# Patient Record
Sex: MALE | Race: WHITE | NOT HISPANIC OR LATINO | Employment: FULL TIME | URBAN - METROPOLITAN AREA
[De-identification: names, ages, dates, MRNs, and addresses within clinical notes are randomized per-mention and may not be internally consistent; named-entity substitution may affect disease eponyms.]

---

## 2017-03-17 ENCOUNTER — ALLSCRIPTS OFFICE VISIT (OUTPATIENT)
Dept: OTHER | Facility: OTHER | Age: 57
End: 2017-03-17

## 2017-04-18 ENCOUNTER — ALLSCRIPTS OFFICE VISIT (OUTPATIENT)
Dept: OTHER | Facility: OTHER | Age: 57
End: 2017-04-18

## 2017-05-02 ENCOUNTER — ALLSCRIPTS OFFICE VISIT (OUTPATIENT)
Dept: OTHER | Facility: OTHER | Age: 57
End: 2017-05-02

## 2017-06-08 RX ORDER — LORATADINE AND PSEUDOEPHEDRINE 10; 240 MG/1; MG/1
1 TABLET, EXTENDED RELEASE ORAL DAILY PRN
COMMUNITY
End: 2018-04-18 | Stop reason: SDUPTHER

## 2017-06-08 RX ORDER — SILDENAFIL 100 MG/1
100 TABLET, FILM COATED ORAL
COMMUNITY
End: 2018-07-06 | Stop reason: SDUPTHER

## 2017-06-08 RX ORDER — SIMVASTATIN 40 MG
40 TABLET ORAL DAILY
COMMUNITY
End: 2018-05-07 | Stop reason: SDUPTHER

## 2017-07-03 ENCOUNTER — ANESTHESIA EVENT (OUTPATIENT)
Dept: GASTROENTEROLOGY | Facility: MEDICAL CENTER | Age: 57
End: 2017-07-03
Payer: COMMERCIAL

## 2017-07-05 ENCOUNTER — HOSPITAL ENCOUNTER (OUTPATIENT)
Facility: MEDICAL CENTER | Age: 57
Setting detail: OUTPATIENT SURGERY
Discharge: HOME | End: 2017-07-05
Attending: INTERNAL MEDICINE | Admitting: INTERNAL MEDICINE
Payer: COMMERCIAL

## 2017-07-05 ENCOUNTER — ANESTHESIA (OUTPATIENT)
Dept: GASTROENTEROLOGY | Facility: MEDICAL CENTER | Age: 57
End: 2017-07-05
Payer: COMMERCIAL

## 2017-07-05 ENCOUNTER — GENERIC CONVERSION - ENCOUNTER (OUTPATIENT)
Dept: GASTROENTEROLOGY | Facility: MEDICAL CENTER | Age: 57
End: 2017-07-05

## 2017-07-05 VITALS
WEIGHT: 205 LBS | HEIGHT: 73 IN | HEART RATE: 68 BPM | TEMPERATURE: 96.9 F | DIASTOLIC BLOOD PRESSURE: 72 MMHG | RESPIRATION RATE: 16 BRPM | BODY MASS INDEX: 27.17 KG/M2 | OXYGEN SATURATION: 94 % | SYSTOLIC BLOOD PRESSURE: 112 MMHG

## 2017-07-05 DIAGNOSIS — R13.10 DYSPHAGIA: ICD-10-CM

## 2017-07-05 PROCEDURE — 88305 TISSUE EXAM BY PATHOLOGIST: CPT | Performed by: INTERNAL MEDICINE

## 2017-07-05 RX ORDER — PROPOFOL 10 MG/ML
INJECTION, EMULSION INTRAVENOUS AS NEEDED
Status: DISCONTINUED | OUTPATIENT
Start: 2017-07-05 | End: 2017-07-05 | Stop reason: SURG

## 2017-07-05 RX ORDER — SODIUM CHLORIDE 9 MG/ML
125 INJECTION, SOLUTION INTRAVENOUS CONTINUOUS
Status: DISCONTINUED | OUTPATIENT
Start: 2017-07-05 | End: 2017-07-05 | Stop reason: HOSPADM

## 2017-07-05 RX ORDER — ALBUTEROL SULFATE 90 UG/1
2 AEROSOL, METERED RESPIRATORY (INHALATION) EVERY 6 HOURS PRN
COMMUNITY
End: 2020-08-26

## 2017-07-05 RX ADMIN — LIDOCAINE HYDROCHLORIDE 50 MG: 20 INJECTION, SOLUTION INTRAVENOUS at 10:02

## 2017-07-05 RX ADMIN — SODIUM CHLORIDE 125 ML/HR: 0.9 INJECTION, SOLUTION INTRAVENOUS at 09:55

## 2017-07-05 RX ADMIN — PROPOFOL 50 MG: 10 INJECTION, EMULSION INTRAVENOUS at 10:06

## 2017-07-05 RX ADMIN — PROPOFOL 125 MG: 10 INJECTION, EMULSION INTRAVENOUS at 10:02

## 2017-07-05 RX ADMIN — SODIUM CHLORIDE: 0.9 INJECTION, SOLUTION INTRAVENOUS at 10:02

## 2017-07-10 ENCOUNTER — GENERIC CONVERSION - ENCOUNTER (OUTPATIENT)
Dept: OTHER | Facility: OTHER | Age: 57
End: 2017-07-10

## 2017-08-01 ENCOUNTER — ALLSCRIPTS OFFICE VISIT (OUTPATIENT)
Dept: OTHER | Facility: OTHER | Age: 57
End: 2017-08-01

## 2017-08-01 ENCOUNTER — GENERIC CONVERSION - ENCOUNTER (OUTPATIENT)
Dept: OTHER | Facility: OTHER | Age: 57
End: 2017-08-01

## 2017-08-01 LAB
BILIRUB UR QL STRIP: NORMAL
CLARITY UR: NORMAL
COLOR UR: YELLOW
GLUCOSE (HISTORICAL): NORMAL
HGB UR QL STRIP.AUTO: NORMAL
KETONES UR STRIP-MCNC: NORMAL MG/DL
LEUKOCYTE ESTERASE UR QL STRIP: NORMAL
NITRITE UR QL STRIP: NORMAL
PH UR STRIP.AUTO: 6 [PH]
PROT UR STRIP-MCNC: NORMAL MG/DL
SP GR UR STRIP.AUTO: 1.02
UROBILINOGEN UR QL STRIP.AUTO: 0.2

## 2018-01-10 NOTE — RESULT NOTES
Discussion/Summary   esophageal bx were negative for any pathology and within normal limits  If pt is still having swallowing issues we can try to do further evaluation with manometry  Please let pt know     Verified Results  (1) TISSUE EXAM [id number] 10:08AM Bisi Suazo     Test Name Result Flag Reference   LAB AP CASE REPORT (Report)     Surgical Pathology Report             Case: [id number]                   Authorizing Provider: Rachel Garrido MD      Collected:      07/05/2017 1008        Ordering Location:   [facility]    Received:      07/06/2017 [facility]                            Pathologist:      Hayley Mandujano DO                               Specimen:  Esophagus, proximal and mid esophagus biopsy r/o eosinophil esophagus   LAB AP FINAL DIAGNOSIS (Report)     A  Esophagus, proximal and mid, biopsy:  - Benign squamous mucosa with nonspecific reactive changes   - No intraepithelial eosinophils, columnar mucosa, intestinal metaplasia   or dysplasia identified  Interpretation performed at [place] [address]    Electronically signed by Hayley Mandujano DO on 7/7/2017 at 9:57 AM   LAB AP SURGICAL ADDITIONAL INFORMATION (Report)     These tests were developed and their performance characteristics   determined by [facility] or [facility]  They may not be cleared or approved by the [place]  Food and   Drug Administration  The FDA has determined that such clearance or   approval is not necessary  These tests are used for clinical purposes  They should not be regarded as investigational or for research  This   laboratory has been approved by CLIA 88, designated as a high-complexity   laboratory and is qualified to perform these tests  LAB AP GROSS DESCRIPTION (Report)     A   The specimen is received in formalin, labeled with the patient's name   and hospital number, and is designated proximal and mid esophagus   biopsy  The specimen consists of multiple white-tan soft tissue fragments   measuring in aggregate 0 6 x 0 2 x 0 1 cm  Entirely submitted  One   cassette  Note: The estimated total formalin fixation time based upon information   provided by the submitting clinician and the standard processing schedule   is 27 75 hours        AEK

## 2018-01-12 VITALS
HEART RATE: 68 BPM | HEIGHT: 72 IN | DIASTOLIC BLOOD PRESSURE: 88 MMHG | WEIGHT: 218 LBS | SYSTOLIC BLOOD PRESSURE: 130 MMHG | BODY MASS INDEX: 29.53 KG/M2

## 2018-01-13 VITALS
DIASTOLIC BLOOD PRESSURE: 60 MMHG | BODY MASS INDEX: 28.71 KG/M2 | HEART RATE: 80 BPM | HEIGHT: 72 IN | SYSTOLIC BLOOD PRESSURE: 96 MMHG | WEIGHT: 212 LBS

## 2018-01-13 NOTE — PROGRESS NOTES
Assessment   1  Encounter for preventive health examination (V70 0) (Z00 00)  2  Employed  3  Hyperlipidemia (272 4) (E78 5)  4  Allergic rhinitis (477 9) (J30 9)    Plan  Allergic rhinitis    · Renew: Loratadine-D 24HR  MG Oral Tablet Extended Release 24 Hour; TAKE 1  TABLET DAILY AS DIRECTED  Hyperlipidemia    · Renew: Simvastatin 40 MG Oral Tablet; take 1 tablet by mouth every day    Discussion/Summary  Patient is a 77-year-old man is here for his annual wellness visits  Since last seen he remains well with no significant new complaints  Physical examination was unremarkable  Blood tests performed at his work revealed a normal CBC, CMP, lipid profile, PSA and TSH  He will continue with his current medications and be seen on an annual basis  He will schedule a follow up colonoscopy as a result of a family history of colon cancer  Chief Complaint  YEARLY PHYSICAL  HX OF HYPERLIPIDEMIA  REVIEW LABS DONE BY EMPLOYER  History of Present Illness  HPI: Patient is here for his annual wellness visit  Since last seen he remains well with no significant new complaints  He continues to use his medication for hyperlipidemia and allergic rhinitis  Review of Systems  GENERAL / CONSTITUTIONAL: The patient feels well and denies any fever, fatigue or recent weight change  HEENT: No recent headaches or change in vision  No upper respiratory symptoms  No change in hearing  RESPIRATORY: No chest pain, cough, shortness of breath or wheezing  CARDIOVASCULAR: No angina, palpitations, dyspnea on exertion or edema  GASTROINTESTINAL: Appetite normal, no abdominal pain orl dyspepsia, no nausea or vomiting or change in bowel habits or rectal bleeding  GENITOURINARY: No urgency, frequency, dysuria, nocturia or hematuria  MUSCULOSKELETAL: No pain swelling of the joints  SKIN & BREASTS: No rashes or itching  No history of hair loss  NEUROLOGIC / PSYCHIATRIC: Patient has no symptoms of anxiety or depression   No recent headaches or muscle weakness  HEMATOLOGIC: No recent bruising or bleeding  Active Problems   1  Allergic rhinitis (477 9) (J30 9)  2  Asthma (493 90) (J45 909)  3  Basal Cell Carcinoma Of Skin Of Trunk (173 51)  4  Erectile dysfunction of non-organic origin (302 72) (F52 21)  5  History of allergy (V15 09) (Z88 9)  6  Hyperlipidemia (272 4) (E78 5)  7  Localized Osteoarthritis Of The Neck (715 38)  8  Migraine headache (346 90) (G43 909)    Surgical History    · History of Knee Surgery    Family History    · Family history of Headache   · Family history of Lung Cancer (V16 1)    · Family history of Acute Lymphoma   · Family history of Acute Myocardial Infarction (V17 3)   · Family history of Esophageal Cancer (V16 0)    · Family history of Colon Cancer (V16 0)   · Family history of Headache   · Family history of Skin Cancer (V16 8)    Social History    · Being A Social Drinker   · Employed   · Never A Smoker    Current Meds  1  Claritin-D 24 Hour  MG Oral Tablet Extended Release 24 Hour; Take 1 tablet daily   as directed; Therapy: 04BHC0730 to (Last Rx:17Mar2014) Ordered  2  ProAir  (90 Base) MCG/ACT Inhalation Aerosol Solution; INHALE 1-2 PUFFS   EVERY 4-6 HOURS AS NEEDED AND AS DIRECTED; Therapy: 31Qos6832 to (Evaluate:30Oct2013)  Requested for: 73QDY4748; Last   Rx:17Jxs8772 Ordered  3  Simvastatin 40 MG Oral Tablet; take 1 tablet by mouth every day; Therapy: 12KEW7685 to (Evaluate:19Mar2016)  Requested for: 63UHJ6018; Last   Rx:02Nzx4745 Ordered  4  Viagra 100 MG Oral Tablet; Therapy: 06CHX3534 to (Last Rx:34Edd7397)  Requested for: 64VRP0462 Ordered  5  Zyclara 3 75 % External Cream;   Therapy: 46ESX5488 to (Last KQ:78MMS7962)  Requested for: 12HGS1163 Ordered    Allergies   1  No Known Drug Allergies   2   Animal dander - Cats    Vitals   Recorded: 91EHG4525 09:01AM   Heart Rate 84   Systolic 088   Diastolic 60   Height 5 ft 11 8 in   Weight 215 lb 3 2 oz   BMI Calculated 29 35   BSA Calculated 2 19     Physical Exam  General: Patient is a well developed and alert and who appears stated age and is in no distress  Vital Signs: As charted  HEENT: Normocephalic  Pupils equal, round and react to light and accommodation  TMs intact  Oral cavity within normal limits  Neck: Supple and symmetric without masses  Thyroid not enlarged  No evidence of carotid bruits  Respiratory: Normal effort  Breath sounds equal with no rhonchi or rales  No evidence of wheezing  Cardiovascular: Regular rate and rhythm  No murmurs or gallop  No edema  Gastrointestinal: Soft  Non-tender  No masses or organomegaly  Musculoskeletal: Intact  Skin: No rashes, ulcers or nodules noted  Neurologic: No focal neurological deficits  Orientated x 3  Psychologic: Mood is normal  Affect is appropriate          Results/Data  [place] 30SJT3718 09:08AM User, Ahs     Test Name Result Flag Reference   SBIRT Screen - Tobacco Screening Result Negative       PHQ-2 Adult Depression Screening 41MJI4066 09:07AM User, Ahs     Test Name Result Flag Reference   PHQ-2 Adult Depression Score 0     Q1: 0, Q2: 0   PHQ-2 Adult Depression Screening Negative         Signatures   Electronically signed by : WES Syed ; Mar 15 2016  8:13AM EST                       (Author)

## 2018-01-14 VITALS
WEIGHT: 214 LBS | SYSTOLIC BLOOD PRESSURE: 120 MMHG | TEMPERATURE: 98.1 F | OXYGEN SATURATION: 99 % | DIASTOLIC BLOOD PRESSURE: 80 MMHG | HEIGHT: 71 IN | HEART RATE: 75 BPM | BODY MASS INDEX: 29.96 KG/M2

## 2018-01-14 VITALS
HEIGHT: 72 IN | WEIGHT: 212 LBS | SYSTOLIC BLOOD PRESSURE: 125 MMHG | BODY MASS INDEX: 28.71 KG/M2 | DIASTOLIC BLOOD PRESSURE: 71 MMHG

## 2018-04-18 DIAGNOSIS — J30.9 ALLERGIC RHINITIS, UNSPECIFIED SEASONALITY, UNSPECIFIED TRIGGER: Primary | ICD-10-CM

## 2018-04-18 RX ORDER — LORATADINE 10 MG
TABLET ORAL
Qty: 30 TABLET | Refills: 0 | Status: SHIPPED | OUTPATIENT
Start: 2018-04-18 | End: 2018-07-06 | Stop reason: ALTCHOICE

## 2018-05-07 DIAGNOSIS — E78.2 MIXED HYPERLIPIDEMIA: ICD-10-CM

## 2018-05-07 DIAGNOSIS — E78.2 MIXED HYPERLIPIDEMIA: Primary | ICD-10-CM

## 2018-05-07 RX ORDER — SIMVASTATIN 40 MG
40 TABLET ORAL DAILY
Qty: 30 TABLET | Refills: 0 | Status: SHIPPED | OUTPATIENT
Start: 2018-05-07 | End: 2018-05-07 | Stop reason: SDUPTHER

## 2018-05-07 RX ORDER — SIMVASTATIN 40 MG
TABLET ORAL
Qty: 90 TABLET | Refills: 0 | Status: SHIPPED | OUTPATIENT
Start: 2018-05-07 | End: 2018-07-06 | Stop reason: SDUPTHER

## 2018-07-06 ENCOUNTER — OFFICE VISIT (OUTPATIENT)
Dept: FAMILY MEDICINE CLINIC | Facility: CLINIC | Age: 58
End: 2018-07-06
Payer: COMMERCIAL

## 2018-07-06 VITALS
DIASTOLIC BLOOD PRESSURE: 70 MMHG | WEIGHT: 213 LBS | HEART RATE: 96 BPM | SYSTOLIC BLOOD PRESSURE: 100 MMHG | BODY MASS INDEX: 28.23 KG/M2 | HEIGHT: 73 IN

## 2018-07-06 DIAGNOSIS — J30.9 ALLERGIC RHINITIS, UNSPECIFIED SEASONALITY, UNSPECIFIED TRIGGER: ICD-10-CM

## 2018-07-06 DIAGNOSIS — E78.2 MIXED HYPERLIPIDEMIA: Primary | ICD-10-CM

## 2018-07-06 DIAGNOSIS — H11.32 SUBCONJUNCTIVAL HEMORRHAGE OF LEFT EYE: ICD-10-CM

## 2018-07-06 DIAGNOSIS — Z12.5 SCREENING FOR PROSTATE CANCER: ICD-10-CM

## 2018-07-06 DIAGNOSIS — Z12.11 SCREEN FOR COLON CANCER: ICD-10-CM

## 2018-07-06 DIAGNOSIS — K21.9 GASTROESOPHAGEAL REFLUX DISEASE WITHOUT ESOPHAGITIS: ICD-10-CM

## 2018-07-06 PROBLEM — L84 CORN OF TOE: Status: ACTIVE | Noted: 2017-04-18

## 2018-07-06 PROCEDURE — 99214 OFFICE O/P EST MOD 30 MIN: CPT | Performed by: FAMILY MEDICINE

## 2018-07-06 RX ORDER — SIMVASTATIN 40 MG
40 TABLET ORAL DAILY
Qty: 90 TABLET | Refills: 1 | Status: SHIPPED | OUTPATIENT
Start: 2018-07-06 | End: 2019-02-02 | Stop reason: SDUPTHER

## 2018-07-06 RX ORDER — LORATADINE AND PSEUDOEPHEDRINE 10; 240 MG/1; MG/1
1 TABLET, EXTENDED RELEASE ORAL DAILY PRN
Qty: 90 TABLET | Refills: 1 | Status: SHIPPED | OUTPATIENT
Start: 2018-07-06 | End: 2019-01-19 | Stop reason: SDUPTHER

## 2018-07-06 RX ORDER — SILDENAFIL 100 MG/1
TABLET, FILM COATED ORAL
COMMUNITY
Start: 2017-07-05 | End: 2021-12-02

## 2018-07-06 RX ORDER — LORATADINE AND PSEUDOEPHEDRINE 10; 240 MG/1; MG/1
1 TABLET, EXTENDED RELEASE ORAL DAILY PRN
COMMUNITY
Start: 2014-03-17 | End: 2018-07-06 | Stop reason: SDUPTHER

## 2018-07-06 RX ORDER — SIMVASTATIN 40 MG
1 TABLET ORAL DAILY
COMMUNITY
Start: 2012-07-27 | End: 2018-07-06 | Stop reason: SDUPTHER

## 2018-07-06 NOTE — ASSESSMENT & PLAN NOTE
Patient to get labs done and will be called with those results  His last lipid panel was within normal limits with his LDL 76 and that was February of 2017

## 2018-07-06 NOTE — ASSESSMENT & PLAN NOTE
Patient has been sneezing a lot and this may be causing his subconjunctival hemorrhage in his left eye

## 2018-07-06 NOTE — ASSESSMENT & PLAN NOTE
Patient has had 3 episodes in the last several months of a burst blood vessel in the left eye  He has a history of a severe corneal abrasion in this eye in the past but it has healed completely and he has no visual disturbance  If the subconjunctival hemorrhage occurs again he is to see his eye doctor

## 2018-07-06 NOTE — PROGRESS NOTES
Assessment/Plan:    Allergic rhinitis  Patient has been sneezing a lot and this may be causing his subconjunctival hemorrhage in his left eye  GERD (gastroesophageal reflux disease)  Stable at the present time  He did have an EGD done several years ago and was within normal limits  Hyperlipidemia  Patient to get labs done and will be called with those results  His last lipid panel was within normal limits with his LDL 76 and that was February of 2017  Subconjunctival hemorrhage of left eye  Patient has had 3 episodes in the last several months of a burst blood vessel in the left eye  He has a history of a severe corneal abrasion in this eye in the past but it has healed completely and he has no visual disturbance  If the subconjunctival hemorrhage occurs again he is to see his eye doctor  Diagnoses and all orders for this visit:    Mixed hyperlipidemia  -     CBC and differential; Future  -     Comprehensive metabolic panel; Future  -     Lipid Panel with Direct LDL reflex; Future  -     TSH, 3rd generation; Future  -     Hemoglobin A1C; Future    Allergic rhinitis, unspecified seasonality, unspecified trigger  -     CBC and differential; Future  -     Comprehensive metabolic panel; Future  -     Lipid Panel with Direct LDL reflex; Future  -     TSH, 3rd generation; Future  -     Hemoglobin A1C; Future    Gastroesophageal reflux disease without esophagitis  -     CBC and differential; Future  -     Comprehensive metabolic panel; Future  -     Lipid Panel with Direct LDL reflex; Future  -     TSH, 3rd generation; Future  -     Hemoglobin A1C; Future    Screen for colon cancer  -     Occult Bloood,Fecal Immunochemical; Future    Screening for prostate cancer  -     PSA, total and free; Future    Subconjunctival hemorrhage of left eye    Other orders  -     loratadine-pseudoephedrine (CLARITIN-D 24-HOUR)  mg per 24 hr tablet;  Take 1 tablet by mouth daily as needed  -     simvastatin (ZOCOR) 40 mg tablet; Take 1 tablet by mouth daily  -     sildenafil (VIAGRA) 100 mg tablet; Take by mouth          Subjective: Follow up asthma, GERD, ED, hyperlipidemia  Pt states he had BW done for his employer as part of a wellness program in March, but we do not have results  He also notes he has been having frequent burst blood vessel in left eye  It has occurred 3 times in the past few months  -  [facility]     Patient ID: Gris Valero is a 62 y o  male  This is a 75-year-old male who comes in for his regular 6 month visit  He is feeling well  He has been having a problem with the left eye and a blood vessel bursting in the eye and it has occurred 3 times in the past few months  The only injury he had to that I was a bad abrasion and had seen a retinologist for it but he has not had any visual disturbances  His blood pressure is 100/70 and his weight is up 1 lb from the previous visit to 213 lb  Reviewing his labs from last year his glucose was 102, his lipids were within normal range  He will get labs fasting and be called with those results now  The following portions of the patient's history were reviewed and updated as appropriate: allergies, current medications, past family history, past medical history, past social history, past surgical history and problem list     Review of Systems   Constitutional: Negative  HENT: Negative  Eyes: Positive for redness  Negative for pain, discharge and itching  Left eye redness from a burst blood vessel   Respiratory: Negative  Cardiovascular: Negative  Gastrointestinal: Negative  Endocrine: Negative  Genitourinary: Negative  Musculoskeletal: Negative  Skin: Negative  Allergic/Immunologic: Negative  Neurological: Negative  Hematological: Negative  Psychiatric/Behavioral: Negative            Objective:      /70 (BP Location: Left arm, Patient Position: Sitting, Cuff Size: Large)   Pulse 96   Ht 6' 1" (1 854 m) Wt 96 6 kg (213 lb)   BMI 28 10 kg/m²          Physical Exam   Constitutional: He is oriented to person, place, and time  He appears well-developed and well-nourished  HENT:   Head: Normocephalic  Right Ear: External ear normal    Left Ear: External ear normal    Mouth/Throat: Oropharynx is clear and moist    Eyes: Conjunctivae and EOM are normal  Pupils are equal, round, and reactive to light  Right eye exhibits no discharge  Left eye exhibits no discharge  No scleral icterus  Subconjunctiva hemorrhage left eye   Neck: Normal range of motion  Neck supple  Cardiovascular: Normal rate, regular rhythm and normal heart sounds  Pulmonary/Chest: Effort normal and breath sounds normal    Abdominal: Soft  Bowel sounds are normal    Musculoskeletal: Normal range of motion  Neurological: He is alert and oriented to person, place, and time  Skin: Skin is warm  Psychiatric: He has a normal mood and affect  His behavior is normal  Judgment and thought content normal    Nursing note and vitals reviewed

## 2018-07-27 ENCOUNTER — OFFICE VISIT (OUTPATIENT)
Dept: FAMILY MEDICINE CLINIC | Facility: CLINIC | Age: 58
End: 2018-07-27
Payer: COMMERCIAL

## 2018-07-27 VITALS
DIASTOLIC BLOOD PRESSURE: 70 MMHG | SYSTOLIC BLOOD PRESSURE: 106 MMHG | HEART RATE: 84 BPM | BODY MASS INDEX: 28.23 KG/M2 | WEIGHT: 213 LBS | HEIGHT: 73 IN

## 2018-07-27 DIAGNOSIS — T30.0 BURN: Primary | ICD-10-CM

## 2018-07-27 PROCEDURE — 3008F BODY MASS INDEX DOCD: CPT | Performed by: FAMILY MEDICINE

## 2018-07-27 PROCEDURE — 99213 OFFICE O/P EST LOW 20 MIN: CPT | Performed by: FAMILY MEDICINE
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
## 2018-07-27 NOTE — ASSESSMENT & PLAN NOTE
Patient's burn appears to be quite significant, but does appear to be healing quite well  There does not appear to be any infection  I would recommend using Silvadene  He should apply this to a sterile gauze, apply the gauze to the area, wrap with Alma, and tape to the Loma  In order to keep the Loma on his leg, I would recommend using CoBan dressing above that
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
## 2019-01-19 DIAGNOSIS — J30.9 ALLERGIC RHINITIS, UNSPECIFIED SEASONALITY, UNSPECIFIED TRIGGER: ICD-10-CM

## 2019-01-21 RX ORDER — LORATADINE 10 MG
TABLET ORAL
Qty: 90 TABLET | Refills: 0 | Status: SHIPPED | OUTPATIENT
Start: 2019-01-21 | End: 2019-04-12 | Stop reason: SDUPTHER

## 2019-02-02 DIAGNOSIS — E78.2 MIXED HYPERLIPIDEMIA: ICD-10-CM

## 2019-02-04 RX ORDER — SIMVASTATIN 40 MG
TABLET ORAL
Qty: 90 TABLET | Refills: 0 | Status: SHIPPED | OUTPATIENT
Start: 2019-02-04 | End: 2019-04-12 | Stop reason: SDUPTHER

## 2019-04-12 ENCOUNTER — OFFICE VISIT (OUTPATIENT)
Dept: FAMILY MEDICINE CLINIC | Facility: CLINIC | Age: 59
End: 2019-04-12
Payer: COMMERCIAL

## 2019-04-12 VITALS
HEART RATE: 80 BPM | WEIGHT: 219 LBS | HEIGHT: 73 IN | SYSTOLIC BLOOD PRESSURE: 116 MMHG | BODY MASS INDEX: 29.03 KG/M2 | DIASTOLIC BLOOD PRESSURE: 70 MMHG

## 2019-04-12 DIAGNOSIS — K21.9 GASTROESOPHAGEAL REFLUX DISEASE WITHOUT ESOPHAGITIS: ICD-10-CM

## 2019-04-12 DIAGNOSIS — M19.91 PRIMARY OSTEOARTHRITIS, UNSPECIFIED SITE: ICD-10-CM

## 2019-04-12 DIAGNOSIS — Z12.11 SCREEN FOR COLON CANCER: ICD-10-CM

## 2019-04-12 DIAGNOSIS — J30.9 ALLERGIC RHINITIS, UNSPECIFIED SEASONALITY, UNSPECIFIED TRIGGER: ICD-10-CM

## 2019-04-12 DIAGNOSIS — Z12.5 SCREENING FOR PROSTATE CANCER: ICD-10-CM

## 2019-04-12 DIAGNOSIS — E78.2 MIXED HYPERLIPIDEMIA: Primary | ICD-10-CM

## 2019-04-12 DIAGNOSIS — J45.20 MILD INTERMITTENT ASTHMA WITHOUT COMPLICATION: ICD-10-CM

## 2019-04-12 DIAGNOSIS — B35.4 TINEA CORPORIS: ICD-10-CM

## 2019-04-12 DIAGNOSIS — F52.21 ERECTILE DYSFUNCTION OF NON-ORGANIC ORIGIN: ICD-10-CM

## 2019-04-12 PROBLEM — H11.32 SUBCONJUNCTIVAL HEMORRHAGE OF LEFT EYE: Status: RESOLVED | Noted: 2018-07-06 | Resolved: 2019-04-12

## 2019-04-12 PROBLEM — T30.0 BURN: Status: RESOLVED | Noted: 2018-07-27 | Resolved: 2019-04-12

## 2019-04-12 PROCEDURE — 3008F BODY MASS INDEX DOCD: CPT | Performed by: FAMILY MEDICINE

## 2019-04-12 PROCEDURE — 1036F TOBACCO NON-USER: CPT | Performed by: FAMILY MEDICINE

## 2019-04-12 PROCEDURE — 99214 OFFICE O/P EST MOD 30 MIN: CPT | Performed by: FAMILY MEDICINE

## 2019-04-12 RX ORDER — LORATADINE AND PSEUDOEPHEDRINE 10; 240 MG/1; MG/1
1 TABLET, EXTENDED RELEASE ORAL DAILY
Qty: 90 TABLET | Refills: 3 | Status: SHIPPED | OUTPATIENT
Start: 2019-04-12 | End: 2020-03-18 | Stop reason: SDUPTHER

## 2019-04-12 RX ORDER — SIMVASTATIN 40 MG
40 TABLET ORAL DAILY
Qty: 90 TABLET | Refills: 3 | Status: SHIPPED | OUTPATIENT
Start: 2019-04-12 | End: 2020-03-18 | Stop reason: SDUPTHER

## 2019-04-12 RX ORDER — CLOTRIMAZOLE AND BETAMETHASONE DIPROPIONATE 10; .64 MG/G; MG/G
CREAM TOPICAL 2 TIMES DAILY
Qty: 30 G | Refills: 0 | Status: SHIPPED | OUTPATIENT
Start: 2019-04-12 | End: 2020-08-26

## 2019-04-12 RX ORDER — MELOXICAM 15 MG/1
TABLET ORAL
Refills: 0 | COMMUNITY
Start: 2019-04-11 | End: 2020-08-26

## 2019-07-26 ENCOUNTER — TELEPHONE (OUTPATIENT)
Dept: FAMILY MEDICINE CLINIC | Facility: CLINIC | Age: 59
End: 2019-07-26

## 2019-07-26 NOTE — TELEPHONE ENCOUNTER
Patient is on meloxicam from his orthopedic  They told him that we should be doing blood work to monitor his liver since he is on that medicine   Please call him at [phone]

## 2019-07-27 NOTE — TELEPHONE ENCOUNTER
With regard to meloxicam, it is unlikely to cause liver damage  It may cause kidney issues  This is true for all nonsteroidal anti-inflammatories  He did have blood work done before, which was normal     If there are specific issues that Orthopedics is concerned about, they should let us know what exactly it is that they are concerned about, especially as they are the ones prescribing the medication, and should be responsible for follow-up of it  We do not have any problems checking blood work, but you do not normally have to do that from meloxicam   Follow-up at office visit to review this in the future

## 2019-07-29 NOTE — TELEPHONE ENCOUNTER
I advised to pt that his last labs were fine but if Ortho is to continue medication they should monitor his labs or he may make a OV here and we can discuss other options

## 2020-03-18 DIAGNOSIS — E78.2 MIXED HYPERLIPIDEMIA: ICD-10-CM

## 2020-03-18 DIAGNOSIS — J30.9 ALLERGIC RHINITIS, UNSPECIFIED SEASONALITY, UNSPECIFIED TRIGGER: ICD-10-CM

## 2020-03-18 RX ORDER — LORATADINE AND PSEUDOEPHEDRINE 10; 240 MG/1; MG/1
1 TABLET, EXTENDED RELEASE ORAL DAILY
Qty: 90 TABLET | Refills: 0 | Status: SHIPPED | OUTPATIENT
Start: 2020-03-18 | End: 2020-08-26 | Stop reason: SDUPTHER

## 2020-03-18 RX ORDER — SIMVASTATIN 40 MG
40 TABLET ORAL DAILY
Qty: 90 TABLET | Refills: 0 | Status: SHIPPED | OUTPATIENT
Start: 2020-03-18 | End: 2020-08-04 | Stop reason: SDUPTHER

## 2020-03-18 NOTE — TELEPHONE ENCOUNTER
BT, Pt is requesting a refill for his Simvastatin and his Claritin D but has not been here since April of 2019, Mat we refill or does pt need ov?

## 2020-03-18 NOTE — TELEPHONE ENCOUNTER
Patient needs an appointment in the near future    In the meantime I will renew his simvastatin and Claritin D  please have the make an appointment when you let him know that the medicine will be renewed otherwise he is going to fall between the cracks and will not be seen in an appropriate

## 2020-08-03 ENCOUNTER — TELEPHONE (OUTPATIENT)
Dept: FAMILY MEDICINE CLINIC | Facility: CLINIC | Age: 60
End: 2020-08-03

## 2020-08-03 NOTE — TELEPHONE ENCOUNTER
PATIENT STATES HE HAS BEEN OUT OF TOWN AND THEN COVID HAPPENED AND HE IS NOT BACK IN TOWN UNTIL THE END OF THE MONTH,WOULD LIKE HIS SIMVASTATIN SENT TO [address] [place] [address] [place] [address]  PHONE [phone]  PATIENT IS OUT OF THIS MEDICATION AND IS SUPPOSED TO BE TAKING DAILY  PATIENT IS SCHEDULED TO SEE DR DEE LAST WEEK IN [place]   PLEASE CALL PATIENT TO ADVISE

## 2020-08-04 DIAGNOSIS — E78.2 MIXED HYPERLIPIDEMIA: ICD-10-CM

## 2020-08-04 DIAGNOSIS — Z12.5 SCREENING FOR PROSTATE CANCER: Primary | ICD-10-CM

## 2020-08-04 RX ORDER — SIMVASTATIN 40 MG
TABLET ORAL
Qty: 90 TABLET | OUTPATIENT
Start: 2020-08-04

## 2020-08-04 RX ORDER — SIMVASTATIN 40 MG
40 TABLET ORAL DAILY
Qty: 30 TABLET | Refills: 0 | Status: SHIPPED | OUTPATIENT
Start: 2020-08-04 | End: 2020-08-26 | Stop reason: SDUPTHER

## 2020-08-04 NOTE — TELEPHONE ENCOUNTER
PATIENT STATES HE HAS BEEN OUT OF TOWN AND THEN COVID HAPPENED AND HE IS NOT BACK IN TOWN UNTIL THE END OF THE MONTH,WOULD LIKE HIS SIMVASTATIN SENT TO [address] [place] [address]  PHONE [phone]  PATIENT IS OUT OF THIS MEDICATION AND IS SUPPOSED TO BE TAKING DAILY  PATIENT IS SCHEDULED TO SEE DR DEE LAST WEEK IN [place]   PLEASE CALL PATIENT TO ADVISE

## 2020-08-26 ENCOUNTER — OFFICE VISIT (OUTPATIENT)
Dept: FAMILY MEDICINE CLINIC | Facility: CLINIC | Age: 60
End: 2020-08-26
Payer: COMMERCIAL

## 2020-08-26 VITALS
WEIGHT: 211 LBS | SYSTOLIC BLOOD PRESSURE: 120 MMHG | HEIGHT: 73 IN | TEMPERATURE: 97.1 F | HEART RATE: 80 BPM | BODY MASS INDEX: 27.96 KG/M2 | DIASTOLIC BLOOD PRESSURE: 74 MMHG

## 2020-08-26 DIAGNOSIS — K21.9 GASTROESOPHAGEAL REFLUX DISEASE WITHOUT ESOPHAGITIS: ICD-10-CM

## 2020-08-26 DIAGNOSIS — L30.9 ECZEMA, UNSPECIFIED TYPE: ICD-10-CM

## 2020-08-26 DIAGNOSIS — Z12.5 PROSTATE CANCER SCREENING: ICD-10-CM

## 2020-08-26 DIAGNOSIS — J45.20 MILD INTERMITTENT ASTHMA WITHOUT COMPLICATION: ICD-10-CM

## 2020-08-26 DIAGNOSIS — E78.2 MIXED HYPERLIPIDEMIA: Primary | ICD-10-CM

## 2020-08-26 DIAGNOSIS — Z80.0 FAMILY HISTORY OF COLON CANCER: ICD-10-CM

## 2020-08-26 DIAGNOSIS — J30.9 ALLERGIC RHINITIS, UNSPECIFIED SEASONALITY, UNSPECIFIED TRIGGER: ICD-10-CM

## 2020-08-26 DIAGNOSIS — C44.519 BASAL CELL CARCINOMA OF SKIN OF TRUNK, EXCEPT SCROTUM: ICD-10-CM

## 2020-08-26 PROBLEM — Z12.11 SCREEN FOR COLON CANCER: Status: RESOLVED | Noted: 2019-04-12 | Resolved: 2020-08-26

## 2020-08-26 PROCEDURE — 3008F BODY MASS INDEX DOCD: CPT | Performed by: FAMILY MEDICINE

## 2020-08-26 PROCEDURE — 1036F TOBACCO NON-USER: CPT | Performed by: FAMILY MEDICINE

## 2020-08-26 PROCEDURE — 3725F SCREEN DEPRESSION PERFORMED: CPT | Performed by: FAMILY MEDICINE

## 2020-08-26 PROCEDURE — 99214 OFFICE O/P EST MOD 30 MIN: CPT | Performed by: FAMILY MEDICINE

## 2020-08-26 RX ORDER — SIMVASTATIN 40 MG
40 TABLET ORAL DAILY
Qty: 90 TABLET | Refills: 3 | Status: SHIPPED | OUTPATIENT
Start: 2020-08-26 | End: 2021-08-30

## 2020-08-26 RX ORDER — MOMETASONE FUROATE 1 MG/G
CREAM TOPICAL DAILY
Qty: 45 G | Refills: 2 | Status: SHIPPED | OUTPATIENT
Start: 2020-08-26 | End: 2021-10-04

## 2020-08-26 RX ORDER — LORATADINE 10 MG
1 TABLET ORAL DAILY
Qty: 90 TABLET | Refills: 1 | Status: SHIPPED | OUTPATIENT
Start: 2020-08-26 | End: 2021-03-15 | Stop reason: SDUPTHER

## 2020-08-26 NOTE — ASSESSMENT & PLAN NOTE
Recommend mometasone  Follow-up in the future as needed  Can also ask Dermatology when he gets in there

## 2020-08-26 NOTE — PROGRESS NOTES
Assessment and Plan:    Problem List Items Addressed This Visit     Allergic rhinitis     Continue Claritin D  Has been OK with decongestants  No changes  Relevant Medications    loratadine-pseudoephedrine (Wal-itin D 24 Hour)  mg per 24 hr tablet    Asthma     Patient does have a history of intermittent asthma, without any current problems  Would recommend that he have an albuterol inhaler available if needed  Recommend flu shot yearly  Basal cell carcinoma of skin of trunk, except scrotum     Patient has had problems previously with basal cell carcinoma, and would like to see Dermatology  Will refer  Relevant Medications    mometasone (ELOCON) 0 1 % cream    Other Relevant Orders    Ambulatory referral to Dermatology    Eczema     Recommend mometasone  Follow-up in the future as needed  Can also ask Dermatology when he gets in there  Relevant Medications    mometasone (ELOCON) 0 1 % cream    GERD (gastroesophageal reflux disease)     Doing well  Avoids certain foods  Hyperlipidemia - Primary     Laboratory studies for cholesterol in February were quite good  Recommend check twice a year  Since he gets a biometric screen with employer, would recommend check 6 months after that  We should follow-up not too long after he has the biometric screen so we can review the numbers as well  Currently, no changes recommended to simvastatin  Check laboratory studies, and then recommend follow-up in 6 months, which should be about the time he is getting the biometric screening done, therefore I will not order more blood tests for this  Relevant Medications    simvastatin (ZOCOR) 40 mg tablet      Other Visit Diagnoses     Family history of colon cancer        Recommend follow with colorectal surgery  Appears he would be due for colonoscopy in approximately 2026, but he will check his records and their records      Relevant Orders    Ambulatory referral for colonoscopy    Ambulatory referral to Colorectal Surgery    Prostate cancer screening        Check PSA with next labs  Ordered previously  Diagnoses and all orders for this visit:    Mixed hyperlipidemia  -     simvastatin (ZOCOR) 40 mg tablet; Take 1 tablet (40 mg total) by mouth daily    Allergic rhinitis, unspecified seasonality, unspecified trigger  -     loratadine-pseudoephedrine (Wal-itin D 24 Hour)  mg per 24 hr tablet; Take 1 tablet by mouth daily    Eczema, unspecified type  -     mometasone (ELOCON) 0 1 % cream; Apply topically daily    Basal cell carcinoma of skin of trunk, except scrotum  -     Ambulatory referral to Dermatology; Future    Family history of colon cancer  Comments:  Recommend follow with colorectal surgery  Appears he would be due for colonoscopy in approximately 2026, but he will check his records and their records  Orders:  -     Ambulatory referral for colonoscopy; Future  -     Ambulatory referral to Colorectal Surgery; Future    Mild intermittent asthma without complication    Gastroesophageal reflux disease without esophagitis    Prostate cancer screening  Comments:  Check PSA with next labs  Ordered previously  Subjective:      Patient ID: Ryne Greenberg is a 61 y o  male  CC:    Chief Complaint   Patient presents with    Follow-up     f/u to chronic conditions and review bw that he brought along with him from work 2/19/20  mjs    Foot Problem     c/o sores on right foot  HPI:    Patient is here to re-evaluate medical problems  Patient has history of seasonal allergies  He currently is using Claritin D  He does not have any side effects from the decongestant portion  Seems to work form quite well  He is requesting renewal     Hyperlipidemia:  Currently on simvastatin  Laboratory studies from February from his biometric screening were normal   No current problems from the statins  GERD: Occasional issues    Had EGD before  He has had some dysphagia with certain meds  EGD was in 2017 for the same issue  Was OK then  Prostate cancer screen: Has not had PSA recently  Colon cancer screen: He had scope before  Appears it was [id number]  Patient reports that he has some lesions on the right foot  Has been there for quite some time  He was not sure what to do about it  Thought that it might be eczema  He also mention that he has several skin lesions from before, would like to see a dermatologist for full skin evaluation  Patient does have a history of asthma, but he has specific triggers for it such as hay, cats  Some other things can also cause it, but overall he has not really needed to use any treatment for it on a longstanding basis  The following portions of the patient's history were reviewed and updated as appropriate: allergies, current medications, past family history, past medical history, past social history, past surgical history and problem list       Review of Systems   Constitutional: Negative  HENT: Negative  Eyes: Negative  Respiratory: Negative  Cardiovascular: Negative  Gastrointestinal: Negative  Endocrine: Negative  Genitourinary: Negative  Musculoskeletal: Negative  Skin: Positive for rash  Allergic/Immunologic: Negative  Neurological: Negative  Hematological: Negative  Psychiatric/Behavioral: Negative  Data to review:   Laboratories from February 19th at lab core reviewed  Blood sugar 100  Creatinine 1 18, GFR 67  Sodium 142, potassium 4 1, calcium 9 4  AST 32, ALT 51) slightly high verses 44)  Total cholesterol 168, LDL 84, HDL 53, triglycerides 155  Uric acid 6 3  Iron 136, normal   TSH 1 84  White count 5 8, hemoglobin 14 7, hematocrit 42 2, platelets 167       Objective:    Vitals:    08/26/20 1802   BP: 120/74   Pulse: 80   Temp: (!) 97 1 °F (36 2 °C)   Weight: 95 7 kg (211 lb)   Height: 6' 1" (1 854 m)        Physical Exam  Vitals signs and nursing note reviewed  Constitutional:       Appearance: Normal appearance  Cardiovascular:      Rate and Rhythm: Normal rate and regular rhythm  Pulses: Normal pulses  Carotid pulses are 2+ on the right side and 2+ on the left side  Heart sounds: Normal heart sounds  No murmur  No gallop  Neurological:      Mental Status: He is alert  BMI Counseling: Body mass index is 27 84 kg/m²  The BMI is above normal  Nutrition recommendations include decreasing portion sizes, encouraging healthy choices of fruits and vegetables, decreasing fast food intake, consuming healthier snacks, limiting drinks that contain sugar, moderation in carbohydrate intake, increasing intake of lean protein, reducing intake of saturated and trans fat and reducing intake of cholesterol  Exercise recommendations include exercising 3-5 times per week  No pharmacotherapy was ordered

## 2020-08-26 NOTE — ASSESSMENT & PLAN NOTE
Patient does have a history of intermittent asthma, without any current problems  Would recommend that he have an albuterol inhaler available if needed  Recommend flu shot yearly

## 2020-08-26 NOTE — PATIENT INSTRUCTIONS
Problem List Items Addressed This Visit     Allergic rhinitis     Continue Claritin D  Has been OK with decongestants  No changes  Relevant Medications    loratadine-pseudoephedrine (Wal-itin D 24 Hour)  mg per 24 hr tablet    Asthma     Patient does have a history of intermittent asthma, without any current problems  Would recommend that he have an albuterol inhaler available if needed  Recommend flu shot yearly  Basal cell carcinoma of skin of trunk, except scrotum     Patient has had problems previously with basal cell carcinoma, and would like to see Dermatology  Will refer  Relevant Medications    mometasone (ELOCON) 0 1 % cream    Other Relevant Orders    Ambulatory referral to Dermatology    Eczema     Recommend mometasone  Follow-up in the future as needed  Can also ask Dermatology when he gets in there  Relevant Medications    mometasone (ELOCON) 0 1 % cream    GERD (gastroesophageal reflux disease)     Doing well  Avoids certain foods  Hyperlipidemia - Primary     Laboratory studies for cholesterol in February were quite good  Recommend check twice a year  Since he gets a biometric screen with employer, would recommend check 6 months after that  We should follow-up not too long after he has the biometric screen so we can review the numbers as well  Currently, no changes recommended to simvastatin  Check laboratory studies, and then recommend follow-up in 6 months, which should be about the time he is getting the biometric screening done, therefore I will not order more blood tests for this  Relevant Medications    simvastatin (ZOCOR) 40 mg tablet      Other Visit Diagnoses     Family history of colon cancer        Recommend follow with colorectal surgery  Appears he would be due for colonoscopy in approximately 2026, but he will check his records and their records      Relevant Orders    Ambulatory referral for colonoscopy    Ambulatory referral to Colorectal Surgery    Prostate cancer screening        Check PSA with next labs  Ordered previously

## 2020-08-26 NOTE — ASSESSMENT & PLAN NOTE
Laboratory studies for cholesterol in February were quite good  Recommend check twice a year  Since he gets a biometric screen with employer, would recommend check 6 months after that  We should follow-up not too long after he has the biometric screen so we can review the numbers as well  Currently, no changes recommended to simvastatin  Check laboratory studies, and then recommend follow-up in 6 months, which should be about the time he is getting the biometric screening done, therefore I will not order more blood tests for this

## 2020-08-26 NOTE — ASSESSMENT & PLAN NOTE
Patient has had problems previously with basal cell carcinoma, and would like to see Dermatology  Will refer

## 2020-10-10 LAB
ALBUMIN SERPL-MCNC: 4.3 G/DL (ref 3.8–4.9)
ALBUMIN/GLOB SERPL: 1.5 {RATIO} (ref 1.2–2.2)
ALP SERPL-CCNC: 60 IU/L (ref 39–117)
ALT SERPL-CCNC: 27 IU/L (ref 0–44)
AST SERPL-CCNC: 23 IU/L (ref 0–40)
BILIRUB SERPL-MCNC: 0.4 MG/DL (ref 0–1.2)
BUN SERPL-MCNC: 19 MG/DL (ref 8–27)
BUN/CREAT SERPL: 18 (ref 10–24)
CALCIUM SERPL-MCNC: 9.9 MG/DL (ref 8.6–10.2)
CHLORIDE SERPL-SCNC: 102 MMOL/L (ref 96–106)
CHOLEST SERPL-MCNC: 163 MG/DL (ref 100–199)
CO2 SERPL-SCNC: 27 MMOL/L (ref 20–29)
CREAT SERPL-MCNC: 1.03 MG/DL (ref 0.76–1.27)
GLOBULIN SER-MCNC: 2.9 G/DL (ref 1.5–4.5)
GLUCOSE SERPL-MCNC: 106 MG/DL (ref 65–99)
HDLC SERPL-MCNC: 50 MG/DL
LDLC SERPL CALC-MCNC: 89 MG/DL (ref 0–99)
POTASSIUM SERPL-SCNC: 4.4 MMOL/L (ref 3.5–5.2)
PROT SERPL-MCNC: 7.2 G/DL (ref 6–8.5)
PSA SERPL-MCNC: 0.7 NG/ML (ref 0–4)
SL AMB EGFR AFRICAN AMERICAN: 91 ML/MIN/1.73
SL AMB EGFR NON AFRICAN AMERICAN: 79 ML/MIN/1.73
SODIUM SERPL-SCNC: 141 MMOL/L (ref 134–144)
TRIGL SERPL-MCNC: 138 MG/DL (ref 0–149)

## 2020-12-01 DIAGNOSIS — J45.20 MILD INTERMITTENT ASTHMA WITHOUT COMPLICATION: Primary | ICD-10-CM

## 2020-12-02 RX ORDER — ALBUTEROL SULFATE 90 UG/1
2 AEROSOL, METERED RESPIRATORY (INHALATION) EVERY 4 HOURS PRN
Qty: 1 INHALER | Refills: 5 | Status: SHIPPED | OUTPATIENT
Start: 2020-12-02 | End: 2021-12-02

## 2021-01-08 ENCOUNTER — ANESTHESIA EVENT (OUTPATIENT)
Dept: GASTROENTEROLOGY | Facility: HOSPITAL | Age: 61
End: 2021-01-08

## 2021-01-11 ENCOUNTER — HOSPITAL ENCOUNTER (OUTPATIENT)
Dept: GASTROENTEROLOGY | Facility: HOSPITAL | Age: 61
Setting detail: OUTPATIENT SURGERY
Discharge: HOME | End: 2021-01-11
Attending: COLON & RECTAL SURGERY | Admitting: COLON & RECTAL SURGERY
Payer: COMMERCIAL

## 2021-01-11 ENCOUNTER — ANESTHESIA (OUTPATIENT)
Dept: GASTROENTEROLOGY | Facility: HOSPITAL | Age: 61
End: 2021-01-11

## 2021-01-11 VITALS — HEART RATE: 66 BPM

## 2021-01-11 VITALS
SYSTOLIC BLOOD PRESSURE: 105 MMHG | BODY MASS INDEX: 27.96 KG/M2 | DIASTOLIC BLOOD PRESSURE: 62 MMHG | WEIGHT: 211 LBS | HEIGHT: 73 IN | HEART RATE: 61 BPM | OXYGEN SATURATION: 97 % | TEMPERATURE: 98.2 F | RESPIRATION RATE: 16 BRPM

## 2021-01-11 DIAGNOSIS — Z86.010 PERSONAL HISTORY OF COLONIC POLYPS: ICD-10-CM

## 2021-01-11 DIAGNOSIS — Z12.11 ENCOUNTER FOR SCREENING FOR MALIGNANT NEOPLASM OF COLON: ICD-10-CM

## 2021-01-11 RX ORDER — PROPOFOL 10 MG/ML
INJECTION, EMULSION INTRAVENOUS AS NEEDED
Status: DISCONTINUED | OUTPATIENT
Start: 2021-01-11 | End: 2021-01-11

## 2021-01-11 RX ORDER — SODIUM CHLORIDE 9 MG/ML
125 INJECTION, SOLUTION INTRAVENOUS CONTINUOUS
Status: DISCONTINUED | OUTPATIENT
Start: 2021-01-11 | End: 2021-01-15 | Stop reason: HOSPADM

## 2021-01-11 RX ORDER — LIDOCAINE HYDROCHLORIDE 20 MG/ML
INJECTION, SOLUTION EPIDURAL; INFILTRATION; INTRACAUDAL; PERINEURAL AS NEEDED
Status: DISCONTINUED | OUTPATIENT
Start: 2021-01-11 | End: 2021-01-11

## 2021-01-11 RX ADMIN — PROPOFOL 50 MG: 10 INJECTION, EMULSION INTRAVENOUS at 08:12

## 2021-01-11 RX ADMIN — SODIUM CHLORIDE: 0.9 INJECTION, SOLUTION INTRAVENOUS at 07:44

## 2021-01-11 RX ADMIN — PROPOFOL 100 MG: 10 INJECTION, EMULSION INTRAVENOUS at 08:07

## 2021-01-11 RX ADMIN — SODIUM CHLORIDE 125 ML/HR: 0.9 INJECTION, SOLUTION INTRAVENOUS at 07:54

## 2021-01-11 RX ADMIN — PROPOFOL 50 MG: 10 INJECTION, EMULSION INTRAVENOUS at 08:14

## 2021-01-11 RX ADMIN — LIDOCAINE HYDROCHLORIDE 100 MG: 20 INJECTION, SOLUTION EPIDURAL; INFILTRATION; INTRACAUDAL; PERINEURAL at 08:07

## 2021-01-11 RX ADMIN — PROPOFOL 50 MG: 10 INJECTION, EMULSION INTRAVENOUS at 08:22

## 2021-01-11 RX ADMIN — PROPOFOL 50 MG: 10 INJECTION, EMULSION INTRAVENOUS at 08:18

## 2021-01-11 NOTE — INTERVAL H&P NOTE
H&P reviewed  After examining the patient I find no changes in the patients condition since the H&P had been written      Vitals:    01/11/21 0739   BP: 126/67   Pulse: 72   Resp: 18   Temp: 98 2 °F (36 8 °C)   SpO2: 98%

## 2021-01-11 NOTE — DISCHARGE INSTRUCTIONS
[facility]  [facility] [address]  Phone: [phone]    DISCHARGE INSTRUCTIONS:    1   _x__ Complete Exam - Normal    2   ___ Exam normal, but entire colon not seen  We will discuss this with you  3   ___ Polyp(s) removed by "burning" - NO pathology report will follow    4   ___ Polyp(s) removed by excision  Pathology report will be available in 4-5 days   Someone from our office will call you with results  5   ___ Exam prompted biopsies  Pathology report will be available in 4-5 days   Someone from our office will call you with results  6   ___ Exam demonstrated findings that need treatment  Prescriptions will be   Given to you  Return to our office in ____ weeks  Please call for appt  7   ___ Original office visit or colonoscopy findings necessitate an office visit  Please call to set up a new appointment    8   ___ Medication  __________________________________________        [address]:    - Go straight home and rest today    - No driving or drinking alcohol for 24 hours    - Resume regular diet and medications unless otherwise instructed  Coumadin and Plavix are blood thinners  You can resume these medications on __________  IF YOU ARE HAVING ANY FEVER, BLEEDING OR PERSISTENT PAIN IN THE ABDOMEN, PLEASE CALL OUR OFFICE ANY DAY OR TIME  [phone]    Colonoscopy   WHAT YOU NEED TO KNOW:   A colonoscopy is a procedure to examine the inside of your colon (intestine) with a scope  Polyps or tissue growths may have been removed during your colonoscopy  It is normal to feel bloated and to have some abdominal discomfort  You should be passing gas  If you have hemorrhoids or you had polyps removed, you may have a small amount of bleeding  DISCHARGE INSTRUCTIONS:   Seek care immediately if:   · You have a large amount of bright red blood in your bowel movements      · Your abdomen is hard and firm and you have severe pain  · You have sudden trouble breathing  Contact your healthcare provider if:   · You develop a rash or hives  · You have a fever within 24 hours of your procedure       · You have not had a bowel movement for 3 days after your procedure  · You have questions or concerns about your condition or care  Activity:   · Do not lift, strain, or run  for 3 days after your procedure  · Rest after your procedure  You have been given medicine to relax you  Do not  drive or make important decisions until the day after your procedure  Return to your normal activity as directed  · Relieve gas and discomfort from bloating  by lying on your right side with a heating pad on your abdomen  You may need to take short walks to help the gas move out  Eat small meals until bloating is relieved  If you had polyps removed: For 7 days after your procedure:  · Do not  take aspirin  · Do not  go on long car rides  Follow up with your healthcare provider as directed:  Write down your questions so you remember to ask them during your visits  © 2017 [address] [employer] [employer] is for End User's use only and may not be sold, redistributed or otherwise used for commercial purposes  All illustrations and images included in CareNotes® are the copyrighted property of [employer]  or [employer]  The above information is an  only  It is not intended as medical advice for individual conditions or treatments  Talk to your doctor, nurse or pharmacist before following any medical regimen to see if it is safe and effective for you

## 2021-01-11 NOTE — ANESTHESIA POSTPROCEDURE EVALUATION
Post-Op Assessment Note    CV Status:  Stable  Pain Score: 0    Pain management: adequate     Mental Status:  Alert and awake   Hydration Status:  Euvolemic and stable   PONV Controlled:  Controlled   Airway Patency:  Patent      Post Op Vitals Reviewed: Yes      Staff: Anesthesiologist         No complications documented      /62 (01/11/21 0844)    Temp      Pulse 61 (01/11/21 0844)   Resp 16 (01/11/21 0844)    SpO2 97 % (01/11/21 0844)

## 2021-01-11 NOTE — ANESTHESIA PREPROCEDURE EVALUATION
Procedure:  COLONOSCOPY    Relevant Problems   CARDIO   (+) Hyperlipidemia      GI/HEPATIC   (+) GERD (gastroesophageal reflux disease)      MUSCULOSKELETAL   (+) Primary osteoarthritis      PULMONARY   (+) Asthma        Physical Exam    Airway    Mallampati score: II  TM Distance: <3 FB  Neck ROM: full     Dental       Cardiovascular  Rhythm: regular, Rate: normal,     Pulmonary  Breath sounds clear to auscultation,     Other Findings        Anesthesia Plan  ASA Score- 2     Anesthesia Type- general with ASA Monitors  Additional Monitors:   Airway Plan:           Plan Factors-Exercise tolerance (METS): >4 METS  Chart reviewed  Existing labs reviewed  Patient summary reviewed  Patient is not a current smoker  Patient not instructed to abstain from smoking on day of procedure  Patient did not smoke on day of surgery  Obstructive sleep apnea risk education given perioperatively  Induction- intravenous  Postoperative Plan-     Informed Consent- Anesthetic plan and risks discussed with patient  Patient/Caregiver provided printed discharge information.

## 2021-03-04 ENCOUNTER — TELEPHONE (OUTPATIENT)
Dept: FAMILY MEDICINE CLINIC | Facility: CLINIC | Age: 61
End: 2021-03-04

## 2021-03-04 NOTE — TELEPHONE ENCOUNTER
This medication is over-the-counter if he needs to get that  Patient can follow-up appointment if he desires

## 2021-03-15 ENCOUNTER — OFFICE VISIT (OUTPATIENT)
Dept: FAMILY MEDICINE CLINIC | Facility: CLINIC | Age: 61
End: 2021-03-15
Payer: COMMERCIAL

## 2021-03-15 VITALS
TEMPERATURE: 97.8 F | BODY MASS INDEX: 29.49 KG/M2 | SYSTOLIC BLOOD PRESSURE: 122 MMHG | HEART RATE: 64 BPM | WEIGHT: 222.5 LBS | DIASTOLIC BLOOD PRESSURE: 78 MMHG | HEIGHT: 73 IN

## 2021-03-15 DIAGNOSIS — J30.9 ALLERGIC RHINITIS, UNSPECIFIED SEASONALITY, UNSPECIFIED TRIGGER: ICD-10-CM

## 2021-03-15 DIAGNOSIS — M17.12 PRIMARY OSTEOARTHRITIS OF LEFT KNEE: ICD-10-CM

## 2021-03-15 DIAGNOSIS — E78.2 MIXED HYPERLIPIDEMIA: Primary | ICD-10-CM

## 2021-03-15 DIAGNOSIS — J45.20 MILD INTERMITTENT ASTHMA WITHOUT COMPLICATION: ICD-10-CM

## 2021-03-15 PROBLEM — L82.1 SEBORRHEIC KERATOSIS: Status: ACTIVE | Noted: 2021-03-15

## 2021-03-15 PROBLEM — L57.8 SOLAR DEGENERATION: Status: ACTIVE | Noted: 2021-01-18

## 2021-03-15 PROCEDURE — 3008F BODY MASS INDEX DOCD: CPT | Performed by: FAMILY MEDICINE

## 2021-03-15 PROCEDURE — 99214 OFFICE O/P EST MOD 30 MIN: CPT | Performed by: FAMILY MEDICINE

## 2021-03-15 RX ORDER — LORATADINE 10 MG
1 TABLET ORAL DAILY
Qty: 90 TABLET | Refills: 1 | Status: SHIPPED | OUTPATIENT
Start: 2021-03-15 | End: 2022-03-04 | Stop reason: SDUPTHER

## 2021-03-15 NOTE — PATIENT INSTRUCTIONS
Problem List Items Addressed This Visit     Allergic rhinitis     Stable at the moment  Continue on Claritin D  Prescription be sent to the pharmacy  Reviewed side effects and problems, i e  Urinary retention, hypertension, palpitations  Patient seems to be tolerating quite well, and not having a lot of problems, therefore continue  Relevant Medications    loratadine-pseudoephedrine (Wal-itin D 24 Hour)  mg per 24 hr tablet    Asthma     Minimal to no problems so far with his asthma  Continue as needed albuterol  Since he really needs to use it, will not add controller drug  Hyperlipidemia - Primary     Cholesterol seems to be doing quite well with simvastatin  Check in 6 months  Relevant Orders    Comprehensive metabolic panel    Lipid panel    Primary osteoarthritis     Patient does continue to have osteoarthritis issues  Following with Orthopedics  They are talking about next step with his left knee  He has had injections, they really did not seem to help  COVID 19 Instructions    Cal Tilley was advised to limit contact with others to essential tasks such as getting food, medications, and medical care  Proper handwashing reviewed, and Hand sanitzer when washing is not available  If the patient develops symptoms of COVID 19, the patient should call the office as soon as possible  For 5737-6247 Flu season, it is strongly recommended that Flu Vaccinations be obtained  Please try to download Google Duo  Once you do download this on your phone, you will be prompted to add your phone number to the account  After that, he should receive a text from [employer], and use that code to verify your phone number  After that, you should be able to use Google Duo to receive and make video calls  Please download Microsoft Teams to your phone or computer  We will be transitioning to this platform for Video Visits    Instructions for downloading this are available from the office  We are committed to getting you vaccinated as soon as possible and will be closely following CDC and [facility]  guidelines as they are released and revised  Please refer to our COVID-19 vaccine webpage for the most up to date information on the vaccine and our distribution efforts      Delfino lundberg

## 2021-03-15 NOTE — ASSESSMENT & PLAN NOTE
Stable at the moment  Continue on Claritin D  Prescription be sent to the pharmacy  Reviewed side effects and problems, i e  Urinary retention, hypertension, palpitations  Patient seems to be tolerating quite well, and not having a lot of problems, therefore continue

## 2021-03-15 NOTE — ASSESSMENT & PLAN NOTE
Minimal to no problems so far with his asthma  Continue as needed albuterol  Since he really needs to use it, will not add controller drug

## 2021-03-15 NOTE — PROGRESS NOTES
Assessment and Plan:    Problem List Items Addressed This Visit     Allergic rhinitis     Stable at the moment  Continue on Claritin D  Prescription be sent to the pharmacy  Reviewed side effects and problems, i e  Urinary retention, hypertension, palpitations  Patient seems to be tolerating quite well, and not having a lot of problems, therefore continue  Relevant Medications    loratadine-pseudoephedrine (Wal-itin D 24 Hour)  mg per 24 hr tablet    Asthma     Minimal to no problems so far with his asthma  Continue as needed albuterol  Since he really needs to use it, will not add controller drug  Hyperlipidemia - Primary     Cholesterol seems to be doing quite well with simvastatin  Check in 6 months  Relevant Orders    Comprehensive metabolic panel    Lipid panel    Primary osteoarthritis     Patient does continue to have osteoarthritis issues  Following with Orthopedics  They are talking about next step with his left knee  He has had injections, they really did not seem to help  Diagnoses and all orders for this visit:    Mixed hyperlipidemia  -     Comprehensive metabolic panel; Future  -     Lipid panel; Future    Mild intermittent asthma without complication    Allergic rhinitis, unspecified seasonality, unspecified trigger  -     loratadine-pseudoephedrine (Wal-itin D 24 Hour)  mg per 24 hr tablet; Take 1 tablet by mouth daily    Primary osteoarthritis of left knee              Subjective:      Patient ID: Loretta Ramirez is a 61 y o  male  CC:    No chief complaint on file  HPI:    Patient is here to follow-up on multiple issues  He brought in blood work from his employment  That included a lipid panel  Reviewed  Hyperlipidemia:  Currently on simvastatin 40 mg  Basal cell carcinoma:  Had followed with Dermatology before    Patient also had a biopsy from Dermatology, which showed a keratosis, which is completely benign  Dermatology also did 5 fluorouracil treatment for facial issues  Patient does have a history of asthma listed, but has not really needed to use anything for quite some time  He did take an inhaler with last visit, but is again not needed to use it often  Since the prescription, he has used it 2 or 3 times  Allergy wise, the patient is using Claritin-D  Uses it every day  Reviewed side effects of Sudafed, including increased blood pressure, heart rate, urinary retention  Patient does have some lesions on his foot  He was using a cream for it, but it did not seem to make a difference  Dermatology would like to biopsy the area  DJD: He is seeing Ortho as he is having increased pain in the knee  Has had injections, but minimal help now  The following portions of the patient's history were reviewed and updated as appropriate: allergies, current medications, past family history, past medical history, past social history, past surgical history and problem list       Review of Systems   Constitutional: Negative  HENT: Negative  Eyes: Negative  Respiratory: Negative  Cardiovascular: Negative  Gastrointestinal: Negative  Endocrine: Negative  Genitourinary: Negative  Musculoskeletal: Negative  Skin: Negative  Allergic/Immunologic: Negative  Neurological: Negative  Hematological: Negative  Psychiatric/Behavioral: Negative  Data to review:   Patient brought a report from his annual wellness screen from work  White count 6 9, hemoglobin 15 2, hematocrit 44 8, platelets 3590  Total cholesterol 161, LDL 93, HDL 46, triglycerides 119  Blood sugar 95  Creatinine 1 09, GFR 73  AST 25, ALT 36  Sodium 140, potassium 3 9, calcium 9 6  PSA 0 6  Nicotine negative        Objective:    Vitals:    03/15/21 1603   BP: 122/78   BP Location: Left arm   Patient Position: Sitting   Cuff Size: Large   Pulse: 64   Temp: 97 8 °F (36 6 °C)   TempSrc: Temporal Weight: 101 kg (222 lb 8 oz)   Height: 6' 1" (1 854 m)        Physical Exam  Vitals signs and nursing note reviewed  Constitutional:       Appearance: Normal appearance  Neck:      Vascular: No carotid bruit  Cardiovascular:      Rate and Rhythm: Normal rate and regular rhythm  Pulses: Normal pulses  Carotid pulses are 2+ on the right side and 2+ on the left side  Heart sounds: Normal heart sounds  No murmur  No gallop  Pulmonary:      Effort: Pulmonary effort is normal  No respiratory distress  Breath sounds: Normal breath sounds  No stridor  No wheezing, rhonchi or rales  Chest:      Chest wall: No tenderness  Neurological:      Mental Status: He is alert  BMI Counseling: Body mass index is 29 36 kg/m²  The BMI is above normal  Nutrition recommendations include decreasing portion sizes, encouraging healthy choices of fruits and vegetables, decreasing fast food intake, consuming healthier snacks, limiting drinks that contain sugar, moderation in carbohydrate intake, increasing intake of lean protein, reducing intake of saturated and trans fat and reducing intake of cholesterol  Exercise recommendations include exercising 3-5 times per week  No pharmacotherapy was ordered

## 2021-03-15 NOTE — ASSESSMENT & PLAN NOTE
Patient does continue to have osteoarthritis issues  Following with Orthopedics  They are talking about next step with his left knee  He has had injections, they really did not seem to help

## 2021-08-26 ENCOUNTER — TELEPHONE (OUTPATIENT)
Dept: FAMILY MEDICINE CLINIC | Facility: CLINIC | Age: 61
End: 2021-08-26

## 2021-08-26 DIAGNOSIS — E78.2 MIXED HYPERLIPIDEMIA: ICD-10-CM

## 2021-08-30 RX ORDER — SIMVASTATIN 40 MG
TABLET ORAL
Qty: 90 TABLET | Refills: 0 | Status: SHIPPED | OUTPATIENT
Start: 2021-08-30 | End: 2021-09-07 | Stop reason: SDUPTHER

## 2021-09-03 NOTE — TELEPHONE ENCOUNTER
Patient called and wants the Simvastatin to go to [facility] lines are down   He is out of this medication

## 2021-09-07 DIAGNOSIS — E78.2 MIXED HYPERLIPIDEMIA: ICD-10-CM

## 2021-09-07 RX ORDER — SIMVASTATIN 40 MG
40 TABLET ORAL DAILY
Qty: 90 TABLET | Refills: 0 | Status: SHIPPED | OUTPATIENT
Start: 2021-09-07 | End: 2021-11-30
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
## 2021-09-08 ENCOUNTER — OFFICE VISIT (OUTPATIENT)
Dept: FAMILY MEDICINE CLINIC | Facility: CLINIC | Age: 61
End: 2021-09-08
Payer: COMMERCIAL

## 2021-09-08 VITALS
WEIGHT: 213 LBS | HEART RATE: 74 BPM | TEMPERATURE: 98.4 F | DIASTOLIC BLOOD PRESSURE: 88 MMHG | HEIGHT: 73 IN | SYSTOLIC BLOOD PRESSURE: 150 MMHG | BODY MASS INDEX: 28.23 KG/M2

## 2021-09-08 DIAGNOSIS — M54.50 LEFT-SIDED LOW BACK PAIN WITHOUT SCIATICA, UNSPECIFIED CHRONICITY: Primary | ICD-10-CM

## 2021-09-08 DIAGNOSIS — M54.50 ACUTE LEFT-SIDED LOW BACK PAIN WITHOUT SCIATICA: ICD-10-CM

## 2021-09-08 LAB
SL AMB  POCT GLUCOSE, UA: NEGATIVE
SL AMB LEUKOCYTE ESTERASE,UA: NEGATIVE
SL AMB POCT BILIRUBIN,UA: NEGATIVE
SL AMB POCT BLOOD,UA: NEGATIVE
SL AMB POCT CLARITY,UA: CLEAR
SL AMB POCT COLOR,UA: NORMAL
SL AMB POCT KETONES,UA: NEGATIVE
SL AMB POCT NITRITE,UA: NEGATIVE
SL AMB POCT PH,UA: 7.5
SL AMB POCT SPECIFIC GRAVITY,UA: 1.01
SL AMB POCT URINE PROTEIN: NEGATIVE
SL AMB POCT UROBILINOGEN: 0.2

## 2021-09-08 PROCEDURE — 99214 OFFICE O/P EST MOD 30 MIN: CPT | Performed by: PHYSICIAN ASSISTANT

## 2021-09-08 PROCEDURE — 81002 URINALYSIS NONAUTO W/O SCOPE: CPT | Performed by: PHYSICIAN ASSISTANT

## 2021-09-08 PROCEDURE — 3725F SCREEN DEPRESSION PERFORMED: CPT | Performed by: PHYSICIAN ASSISTANT

## 2021-09-08 RX ORDER — METHOCARBAMOL 750 MG/1
750 TABLET, FILM COATED ORAL 3 TIMES DAILY
Qty: 30 TABLET | Refills: 0 | Status: SHIPPED | OUTPATIENT
Start: 2021-09-08 | End: 2021-09-20

## 2021-09-08 RX ORDER — IBUPROFEN 600 MG/1
600 TABLET ORAL EVERY 6 HOURS PRN
COMMUNITY
Start: 2021-08-23 | End: 2021-09-20

## 2021-09-08 NOTE — PATIENT INSTRUCTIONS
Problem List Items Addressed This Visit     None      Visit Diagnoses     Left-sided low back pain without sciatica, unspecified chronicity    -  Primary    Relevant Orders    POCT urine dip

## 2021-09-08 NOTE — PROGRESS NOTES
Assessment and Plan:    Problem List Items Addressed This Visit        Other    Acute left-sided low back pain without sciatica     Check xray lumbar if symptoms continue  Trial of continue NSAIDS and robaxin  Ice trial             Relevant Medications    methocarbamol (ROBAXIN) 750 mg tablet    Other Relevant Orders    XR spine lumbar minimum 4 views non injury      Other Visit Diagnoses     Left-sided low back pain without sciatica, unspecified chronicity    -  Primary    Relevant Orders    POCT urine dip (Completed)    XR spine lumbar minimum 4 views non injury                 Diagnoses and all orders for this visit:    Left-sided low back pain without sciatica, unspecified chronicity  -     POCT urine dip  -     XR spine lumbar minimum 4 views non injury; Future    Acute left-sided low back pain without sciatica  -     methocarbamol (ROBAXIN) 750 mg tablet; Take 1 tablet (750 mg total) by mouth 3 (three) times a day for 14 days  -     XR spine lumbar minimum 4 views non injury; Future    Other orders  -     ibuprofen (MOTRIN) 600 mg tablet; Take 600 mg by mouth every 6 (six) hours as needed              Subjective:      Patient ID: Michael De Los Santos is a 64 y o  male  CC:    Chief Complaint   Patient presents with    Back Pain     Low left sided back pain that radiates into left kidney area  Pain started Saturday  No specific injury  -  lsh       HPI:      Patient here today because on Saturday after eating breakfast he all of a sudden started having acute non sharp pain in his left lower back without radiation down his leg numbness tingling in his feet weakness in his legs  No history of injury  He has a history of cervical disc herniations but has never had any back problems  He does sit in travel a lot for his job  He has been taking high strength ibuprofen without relief he has tried heat without relief  No urinary incontinence issues  No UTI symptoms  GI within normal limits        The following portions of the patient's history were reviewed and updated as appropriate: allergies, current medications, past family history, past medical history, past social history, past surgical history and problem list       Review of Systems   Constitutional: Negative  HENT: Negative  Eyes: Negative  Respiratory: Negative  Cardiovascular: Negative  Gastrointestinal: Negative  Endocrine: Negative  Genitourinary: Negative  Musculoskeletal: Positive for back pain  Skin: Negative  Allergic/Immunologic: Negative  Neurological: Negative  Hematological: Negative  Psychiatric/Behavioral: Negative  Data to review:       Objective:    Vitals:    09/08/21 1502   BP: 150/88   BP Location: Left arm   Patient Position: Sitting   Cuff Size: Large   Pulse: 74   Temp: 98 4 °F (36 9 °C)   TempSrc: Oral   Weight: 96 6 kg (213 lb)   Height: 6' 1" (1 854 m)        Physical Exam  Vitals and nursing note reviewed  Constitutional:       Appearance: Normal appearance  HENT:      Head: Normocephalic and atraumatic  Eyes:      General: Lids are normal       Conjunctiva/sclera: Conjunctivae normal       Pupils: Pupils are equal, round, and reactive to light  Cardiovascular:      Rate and Rhythm: Normal rate and regular rhythm  Heart sounds: Normal heart sounds  Pulmonary:      Effort: Pulmonary effort is normal       Breath sounds: Normal breath sounds  Musculoskeletal:        Back:       Comments:  No pain to palpation of this area but this is the area of which patient is having discomfort  Negative sciatic notch tenderness positive straight leg raise on the left  Good strength intact bilateral lower extremities deep tendon reflexes within normal limits  Skin:     General: Skin is warm and dry  Neurological:      General: No focal deficit present  Mental Status: He is alert  Mental status is at baseline     Psychiatric:         Mood and Affect: Mood normal          Behavior: Behavior normal          Thought Content:  Thought content normal          Judgment: Judgment normal

## 2021-09-10 ENCOUNTER — APPOINTMENT (OUTPATIENT)
Dept: RADIOLOGY | Facility: MEDICAL CENTER | Age: 61
End: 2021-09-10
Payer: COMMERCIAL

## 2021-09-10 DIAGNOSIS — M54.50 LEFT-SIDED LOW BACK PAIN WITHOUT SCIATICA, UNSPECIFIED CHRONICITY: ICD-10-CM

## 2021-09-10 DIAGNOSIS — M54.50 ACUTE LEFT-SIDED LOW BACK PAIN WITHOUT SCIATICA: ICD-10-CM

## 2021-09-10 PROCEDURE — 72110 X-RAY EXAM L-2 SPINE 4/>VWS: CPT

## 2021-09-17 ENCOUNTER — TELEPHONE (OUTPATIENT)
Dept: FAMILY MEDICINE CLINIC | Facility: CLINIC | Age: 61
End: 2021-09-17

## 2021-09-17 NOTE — TELEPHONE ENCOUNTER
PT HAD XRAYS DONE LAST Friday HAS NOT HEARD ANY RESULTS, AND IS STILL IN A LOT OF PAIN  HE IS ASKING IF SOMEONE CAN CALL SOMETHING IN FOR THE PAIN  WHEN HE SAW [facility] SHE HAD GIVEN HIM MUSCLE RELAXERS BUT THEY DON'T SEEM TO BE WORKING AND HE DOESN'T WANT TO GO ANOTHER WEEKEND WITH THIS PAIN  AND HE REALLY WANTS TO FUNCTION NORMALLY    CALL PT [phone]

## 2021-09-17 NOTE — TELEPHONE ENCOUNTER
HAD XRAYS DONE LAST Friday AT [facility] AND IS ASKING FOR A CALL BACK WITH RESULTS    PT CAN BE REACHED [phone]

## 2021-09-19 NOTE — RESULT ENCOUNTER NOTE
Please let pt know that his xray lumbar spine does warrant further eval with MRI to assess for disc herniations as cause of his back pain Order was placed

## 2021-09-20 ENCOUNTER — OFFICE VISIT (OUTPATIENT)
Dept: FAMILY MEDICINE CLINIC | Facility: CLINIC | Age: 61
End: 2021-09-20
Payer: COMMERCIAL

## 2021-09-20 VITALS
WEIGHT: 215 LBS | OXYGEN SATURATION: 97 % | BODY MASS INDEX: 28.49 KG/M2 | HEART RATE: 68 BPM | SYSTOLIC BLOOD PRESSURE: 106 MMHG | RESPIRATION RATE: 18 BRPM | HEIGHT: 73 IN | DIASTOLIC BLOOD PRESSURE: 74 MMHG

## 2021-09-20 DIAGNOSIS — F52.21 ERECTILE DYSFUNCTION OF NON-ORGANIC ORIGIN: ICD-10-CM

## 2021-09-20 DIAGNOSIS — K21.9 GASTROESOPHAGEAL REFLUX DISEASE WITHOUT ESOPHAGITIS: ICD-10-CM

## 2021-09-20 DIAGNOSIS — J45.20 MILD INTERMITTENT ASTHMA WITHOUT COMPLICATION: ICD-10-CM

## 2021-09-20 DIAGNOSIS — M54.50 ACUTE LEFT-SIDED LOW BACK PAIN WITHOUT SCIATICA: Primary | ICD-10-CM

## 2021-09-20 DIAGNOSIS — J30.9 ALLERGIC RHINITIS, UNSPECIFIED SEASONALITY, UNSPECIFIED TRIGGER: ICD-10-CM

## 2021-09-20 DIAGNOSIS — E78.2 MIXED HYPERLIPIDEMIA: ICD-10-CM

## 2021-09-20 PROCEDURE — 99214 OFFICE O/P EST MOD 30 MIN: CPT | Performed by: NURSE PRACTITIONER

## 2021-09-20 RX ORDER — METHOCARBAMOL 750 MG/1
750 TABLET, FILM COATED ORAL EVERY 8 HOURS PRN
Qty: 30 TABLET | Refills: 0 | Status: SHIPPED | OUTPATIENT
Start: 2021-09-20 | End: 2021-10-28

## 2021-09-20 RX ORDER — NAPROXEN 500 MG/1
500 TABLET ORAL 2 TIMES DAILY WITH MEALS
Qty: 60 TABLET | Refills: 0 | Status: SHIPPED | OUTPATIENT
Start: 2021-09-20 | End: 2021-12-02

## 2021-09-20 RX ORDER — PREDNISONE 20 MG/1
60 TABLET ORAL DAILY
Qty: 15 TABLET | Refills: 0 | Status: SHIPPED | OUTPATIENT
Start: 2021-09-20 | End: 2021-09-25

## 2021-09-20 NOTE — TELEPHONE ENCOUNTER
Please let pt know I apologize  I just go this message today as he called after I left the office on Friday  I can call him in a presdnisone taper if he is still in pain? ? Also I did review his xray and he needs Mri  Please see result note

## 2021-09-20 NOTE — PATIENT INSTRUCTIONS
Do not take naproxen until you have finished 5th day of Prednisone  Tylenol and Robaxin can be taken with Prednisone  Also while taking Naproxen do not take Ibuprofen, Advil, Aleve or any other NSAID's  Acute Low Back Pain, Ambulatory Care   GENERAL INFORMATION:   Acute low back pain  is discomfort in your lower back area that lasts for less than 12 weeks  The word acute is used to describe pain that starts suddenly, worsens quickly, and lasts for a short time  Common symptoms include the following:   · Back stiffness or spasms    · Pain down the back or side of one leg    · Holding yourself in an unusual position or posture to decrease your back pain    · Not being able to find a sitting position that is comfortable    · Slow increase in your pain for 24 to 48 hours after you stress your back    · Tenderness on your lower back or severe pain when you move your back  Seek immediate care for the following symptoms:   · Severe pain    · Sudden stiffness and heaviness in both buttocks down to both legs    · Numbness or weakness in one leg, or pain in both legs    · Numbness in your genital area or across your lower back    · Unable to control your urine or bowel movements  Treatment for acute low back pain  may include any of the following:  · Medicines:      ¨ NSAIDs  help decrease swelling and pain or fever  This medicine is available with or without a doctor's order  NSAIDs can cause stomach bleeding or kidney problems in certain people  If you take blood thinner medicine, always ask your healthcare provider if NSAIDs are safe for you  Always read the medicine label and follow directions  ¨ Muscle relaxers  help decrease muscle spasms pain  ¨ Prescription pain medicine  may be given  Ask how to take this medicine safely  · Surgery  may be needed if your pain is severe and other treatments do not work   Surgery may be needed for conditions of the lumbar spine, such as herniated disc or spinal stenosis  Manage your symptoms:   · Sleep on a firm mattress  If you do not have a firm mattress, have someone move your mattress to the floor for a few days  A piece of plywood under your mattress can also help make it firmer  · Apply ice  on your lower back for 15 to 20 minutes every hour or as directed  Use an ice pack, or put crushed ice in a plastic bag  Cover it with a towel  Ice helps prevent tissue damage and decreases swelling and pain  You can alternate ice and heat  · Apply heat  on your lower back for 20 to 30 minutes every 2 hours for as many days as directed  Heat helps decrease pain and muscle spasms  · Go to physical therapy  A physical therapist teaches you exercises to help improve movement and strength, and to decrease pain  Prevent acute low back pain:   · Use proper body mechanics  ¨ Bend at the hips and knees when you  objects  Do not bend from the waist  Use your leg muscles as you lift the load  Do not use your back  Keep the object close to your chest as you lift it  Try not to twist or lift anything above your waist     ¨ Change your position often when you stand for long periods of time  Rest one foot on a small box or footrest, and then switch to the other foot often  ¨ Try not to sit for long periods of time  When you do, sit in a straight-backed chair with your feet flat on the floor  Never reach, pull, or push while you are sitting  · Exercise regularly  Warm up before you exercise  Do exercises that strengthen your back muscles  Ask about the best exercise plan for you  · Maintain a healthy weight  Ask your healthcare provider how much you should weigh  Ask him to help you create a weight loss plan if you are overweight  Follow up with your healthcare provider as directed:  Return for a follow-up visit if you still have pain after 1 to 3 weeks of treatment  You may need to visit an orthopedist if your back pain lasts more than 6 to 12 weeks   Write down your questions so you remember to ask them during your visits  CARE AGREEMENT:   You have the right to help plan your care  Learn about your health condition and how it may be treated  Discuss treatment options with your caregivers to decide what care you want to receive  You always have the right to refuse treatment  The above information is an  only  It is not intended as medical advice for individual conditions or treatments  Talk to your doctor, nurse or pharmacist before following any medical regimen to see if it is safe and effective for you  © 2014 [address] is for End User's use only and may not be sold, redistributed or otherwise used for commercial purposes  All illustrations and images included in CareNotes® are the copyrighted property of [employer]  or [employer]

## 2021-09-20 NOTE — PROGRESS NOTES
Assessment and Plan:    Problem List Items Addressed This Visit        Digestive    GERD (gastroesophageal reflux disease)     Currently diet controlled  Respiratory    Allergic rhinitis     Well controlled on current regimen  Relevant Medications    predniSONE 20 mg tablet    naproxen (NAPROSYN) 500 mg tablet    Asthma     Well controlled with as needed use of albuterol inhaler  Other    Erectile dysfunction of non-organic origin     Well controlled with as needed use of Viagra  Hyperlipidemia     Lipid panel is ordered to be drawn prior to next office visit  Acute left-sided low back pain without sciatica - Primary     Though patient does have a negative straight leg raise test due to the descriptions and location of his pain I feel there is most likely some sciatic involvement  Five day course of prednisone ordered  Patient then advised to begin naproxen after finishing prednisone  Robaxin ordered to be used as needed every 8 hours  Patient also referred to [facility]   MRI was ordered previously so patient should be contacted shortly to have this scheduled  Relevant Medications    predniSONE 20 mg tablet    naproxen (NAPROSYN) 500 mg tablet    methocarbamol (ROBAXIN) 750 mg tablet    Other Relevant Orders    Ambulatory Referral to [facility]                 Diagnoses and all orders for this visit:    Acute left-sided low back pain without sciatica  -     predniSONE 20 mg tablet; Take 3 tablets (60 mg total) by mouth daily for 5 days  -     naproxen (NAPROSYN) 500 mg tablet; Take 1 tablet (500 mg total) by mouth 2 (two) times a day with meals  -     methocarbamol (ROBAXIN) 750 mg tablet;  Take 1 tablet (750 mg total) by mouth every 8 (eight) hours as needed for muscle spasms  -     Ambulatory Referral to [facility]; Future    Gastroesophageal reflux disease without esophagitis    Allergic rhinitis, unspecified seasonality, unspecified trigger    Mild intermittent asthma without complication    Erectile dysfunction of non-organic origin    Mixed hyperlipidemia              Subjective:      Patient ID: Laura Mullins is a 64 y o  male  CC:    Chief Complaint   Patient presents with    Follow-up     for back pain,     Back Pain       HPI:    Left lower back pain: Patient was originally seen in the office on 09/08/2021 for symptoms of left lower back pain without sciatica  Patient was prescribed Robaxin to be used as needed for his back pain  Patient had an x-ray of his lumbar spine completed on 09/10/2021 which showed multilevel degenerative changes from L1-S1  Patient then had an MRI of his lumbar spine ordered which has not been completed yet  The patient reports that the pain in his left lower back has remained constant  The patient reports that his pain does mostly remains in the midline of his back near his spinal cod  He does report some radiation to his buttocks area intermittent but he states the pain never travels to his left hip or leg  He denies any paresthesias  The patient has run out of Robaxin at this point and has been taking Ibuprofen 600 mg up to every 6 hours  GERD: The patient reports this is diet controlled  He denies any current issues  Allergic rhinitis:  Well controlled with daily use of loratadine with Sudafed  Asthma:  Well controlled with as needed use of albuterol inhaler  The patient reports he rarely needs to use his inhaler  He states he only uses it a few times in a year  ED:  Well controlled with as needed use of Viagra  Patient denies any current BPH symptoms such as difficulty starting his stream, dribbling, or frequent nocturia  Hyperlipidemia:  Well controlled on current dose of simvastatin        The following portions of the patient's history were reviewed and updated as appropriate: allergies, current medications, past family history, past medical history, past social history, past surgical history and problem list       Review of Systems   Constitutional: Negative for chills and fever  HENT: Negative for ear pain and sore throat  Eyes: Negative for pain and visual disturbance  Respiratory: Negative for cough, chest tightness, shortness of breath and wheezing  Cardiovascular: Negative for chest pain, palpitations and leg swelling  Gastrointestinal: Negative for abdominal pain, constipation, diarrhea, nausea and vomiting  Endocrine: Negative for cold intolerance and heat intolerance  Genitourinary: Negative for decreased urine volume, dysuria and hematuria  Musculoskeletal: Positive for back pain (left lower back)  Negative for arthralgias and myalgias  Skin: Negative for color change and rash  Allergic/Immunologic: Positive for environmental allergies  Neurological: Negative for dizziness, seizures, syncope, weakness, light-headedness, numbness and headaches  Hematological: Negative for adenopathy  Psychiatric/Behavioral: Negative for confusion  The patient is not nervous/anxious  All other systems reviewed and are negative  Data to review:       Objective:    Vitals:    09/20/21 1436   BP: 106/74   BP Location: Left arm   Patient Position: Sitting   Cuff Size: Adult   Pulse: 68   Resp: 18   SpO2: 97%   Weight: 97 5 kg (215 lb)   Height: 6' 1" (1 854 m)        Physical Exam  Vitals and nursing note reviewed  Constitutional:       General: He is not in acute distress  Appearance: Normal appearance  He is well-developed  He is not ill-appearing  HENT:      Head: Normocephalic and atraumatic  Eyes:      Conjunctiva/sclera: Conjunctivae normal    Cardiovascular:      Rate and Rhythm: Normal rate and regular rhythm  Pulses: Normal pulses  Carotid pulses are 2+ on the right side and 2+ on the left side  Posterior tibial pulses are 2+ on the right side and 2+ on the left side        Heart sounds: Normal heart sounds  No murmur heard  Pulmonary:      Effort: Pulmonary effort is normal  No respiratory distress  Breath sounds: Normal breath sounds  No wheezing or rhonchi  Abdominal:      General: Abdomen is flat  Bowel sounds are normal  There is no distension  Palpations: Abdomen is soft  Tenderness: There is no abdominal tenderness  There is no guarding  Musculoskeletal:         General: Normal range of motion  Cervical back: Normal range of motion and neck supple  Lumbar back: Spasms and tenderness present  No swelling or bony tenderness  Normal range of motion  Negative right straight leg raise test and negative left straight leg raise test       Right lower leg: No edema  Left lower leg: No edema  Comments: Pain reports pain with palpation of left lower latissimus dorsi muscle  He also reports some pain with palpation of his left upper buttocks area near SI joint  Patient does report muscle spasms in this area but denies any decreased range of movement  Straight leg raise was negative on left side  Skin:     General: Skin is warm and dry  Capillary Refill: Capillary refill takes less than 2 seconds  Neurological:      General: No focal deficit present  Mental Status: He is alert and oriented to person, place, and time  Psychiatric:         Mood and Affect: Mood normal          Behavior: Behavior normal          Thought Content:  Thought content normal          Judgment: Judgment normal

## 2021-09-21 ENCOUNTER — HOSPITAL ENCOUNTER (EMERGENCY)
Facility: HOSPITAL | Age: 61
Discharge: HOME | End: 2021-09-21
Attending: EMERGENCY MEDICINE | Admitting: EMERGENCY MEDICINE
Payer: COMMERCIAL

## 2021-09-21 ENCOUNTER — APPOINTMENT (EMERGENCY)
Dept: CT IMAGING | Facility: HOSPITAL | Age: 61
End: 2021-09-21
Payer: COMMERCIAL

## 2021-09-21 VITALS
OXYGEN SATURATION: 100 % | TEMPERATURE: 98 F | HEART RATE: 70 BPM | RESPIRATION RATE: 16 BRPM | SYSTOLIC BLOOD PRESSURE: 167 MMHG | DIASTOLIC BLOOD PRESSURE: 67 MMHG

## 2021-09-21 DIAGNOSIS — M54.50 ACUTE LOW BACK PAIN: Primary | ICD-10-CM

## 2021-09-21 DIAGNOSIS — M51.36 DEGENERATIVE DISC DISEASE, LUMBAR: ICD-10-CM

## 2021-09-21 LAB
BACTERIA UR QL AUTO: ABNORMAL /HPF
BILIRUB UR QL STRIP: NEGATIVE
CLARITY UR: CLEAR
COLOR UR: YELLOW
GLUCOSE UR STRIP-MCNC: NEGATIVE MG/DL
HGB UR QL STRIP.AUTO: ABNORMAL
KETONES UR STRIP-MCNC: NEGATIVE MG/DL
LEUKOCYTE ESTERASE UR QL STRIP: NEGATIVE
NITRITE UR QL STRIP: NEGATIVE
NON-SQ EPI CELLS URNS QL MICRO: ABNORMAL /HPF
PH UR STRIP.AUTO: 7.5 [PH] (ref 4.5–8)
PROT UR STRIP-MCNC: NEGATIVE MG/DL
RBC #/AREA URNS AUTO: ABNORMAL /HPF
SP GR UR STRIP.AUTO: 1.02 (ref 1–1.03)
UROBILINOGEN UR QL STRIP.AUTO: 0.2 E.U./DL
WBC #/AREA URNS AUTO: ABNORMAL /HPF

## 2021-09-21 PROCEDURE — 96372 THER/PROPH/DIAG INJ SC/IM: CPT

## 2021-09-21 PROCEDURE — 72131 CT LUMBAR SPINE W/O DYE: CPT

## 2021-09-21 PROCEDURE — 99284 EMERGENCY DEPT VISIT MOD MDM: CPT

## 2021-09-21 PROCEDURE — 99284 EMERGENCY DEPT VISIT MOD MDM: CPT | Performed by: PHYSICIAN ASSISTANT

## 2021-09-21 PROCEDURE — 81001 URINALYSIS AUTO W/SCOPE: CPT

## 2021-09-21 RX ORDER — KETOROLAC TROMETHAMINE 30 MG/ML
15 INJECTION, SOLUTION INTRAMUSCULAR; INTRAVENOUS ONCE
Status: COMPLETED | OUTPATIENT
Start: 2021-09-21 | End: 2021-09-21

## 2021-09-21 RX ORDER — HYDROCODONE BITARTRATE AND ACETAMINOPHEN 5; 325 MG/1; MG/1
1 TABLET ORAL EVERY 6 HOURS PRN
Qty: 15 TABLET | Refills: 0 | Status: SHIPPED | OUTPATIENT
Start: 2021-09-21 | End: 2021-09-27

## 2021-09-21 RX ORDER — LIDOCAINE 50 MG/G
1 PATCH TOPICAL DAILY
Qty: 15 PATCH | Refills: 0 | Status: SHIPPED | OUTPATIENT
Start: 2021-09-21 | End: 2021-10-28

## 2021-09-21 RX ORDER — LIDOCAINE 50 MG/G
1 PATCH TOPICAL ONCE
Status: DISCONTINUED | OUTPATIENT
Start: 2021-09-21 | End: 2021-09-21 | Stop reason: HOSPADM

## 2021-09-21 RX ORDER — CYCLOBENZAPRINE HCL 10 MG
10 TABLET ORAL ONCE
Status: COMPLETED | OUTPATIENT
Start: 2021-09-21 | End: 2021-09-21

## 2021-09-21 RX ORDER — HYDROCODONE BITARTRATE AND ACETAMINOPHEN 5; 325 MG/1; MG/1
1 TABLET ORAL ONCE
Status: COMPLETED | OUTPATIENT
Start: 2021-09-21 | End: 2021-09-21

## 2021-09-21 RX ORDER — CYCLOBENZAPRINE HCL 10 MG
10 TABLET ORAL 2 TIMES DAILY PRN
Qty: 20 TABLET | Refills: 0 | Status: SHIPPED | OUTPATIENT
Start: 2021-09-21 | End: 2021-10-28

## 2021-09-21 RX ADMIN — KETOROLAC TROMETHAMINE 15 MG: 30 INJECTION, SOLUTION INTRAMUSCULAR; INTRAVENOUS at 11:45

## 2021-09-21 RX ADMIN — HYDROCODONE BITARTRATE AND ACETAMINOPHEN 1 TABLET: 5; 325 TABLET ORAL at 11:45

## 2021-09-21 RX ADMIN — LIDOCAINE 1 PATCH: 50 PATCH CUTANEOUS at 11:45

## 2021-09-21 RX ADMIN — CYCLOBENZAPRINE HYDROCHLORIDE 10 MG: 10 TABLET, FILM COATED ORAL at 12:59

## 2021-09-21 RX ADMIN — KETOROLAC TROMETHAMINE 15 MG: 30 INJECTION, SOLUTION INTRAMUSCULAR; INTRAVENOUS at 13:50

## 2021-09-21 NOTE — ED PROVIDER NOTES
History  Chief Complaint   Patient presents with    Back Pain     Pt reports worsening back pain over the last 2 weeks  Pt reports 2 follow up with PCP, taking prescribed medication with no relief  Pt denies bowel/bladder dysfunction      Patient is a 77-year-old male presenting to ED for evaluation of low back pain x2 weeks  Patient denies any falls, trauma or injury  Patient saw his PCP on 09/08 and was prescribed Robaxin  He had an outpatient x-ray of lumbar spine done at this time that showed degenerative changes and has an MRI scheduled for 10/4  Patient was seen again yesterday by his PCP for continued pain and follow-up on x-ray results  Patient was prescribed steroids at the follow-up visit and has only taken 1 dose so far  Patient was told not to take the prescribed naproxen until steroids were completed so he has only been taking Tylenol for the pain with no relief  He states that after the appointment with his PCP yesterday, he was bending over to feed his dogs and had sudden worsening of this pain  Pain is worsened with sitting and ambulation and significantly improved with lying flat  The pain is localized to the left lumbar spine and left upper gluteal region and does not radiate to lower extremities  He denies numbness or weakness in lower extremities, bowel/bladder incontinence, urinary retention or saddle anesthesia  He denies fevers, chills, abdominal pain, urgency/frequency, dysuria or hematuria  Prior to Admission Medications   Prescriptions Last Dose Informant Patient Reported? Taking?    Multiple Vitamin (MULTI-VITAMIN DAILY PO)  Self Yes No   Sig: Take by mouth   albuterol (PROVENTIL HFA,VENTOLIN HFA) 90 mcg/act inhaler  Self No No   Sig: Inhale 2 puffs every 4 (four) hours as needed for wheezing or shortness of breath   loratadine-pseudoephedrine (Wal-itin D 24 Hour)  mg per 24 hr tablet  Self No No   Sig: Take 1 tablet by mouth daily   methocarbamol (ROBAXIN) 750 mg tablet   No No   Sig: Take 1 tablet (750 mg total) by mouth every 8 (eight) hours as needed for muscle spasms   mometasone (ELOCON) 0 1 % cream  Self No No   Sig: Apply topically daily   naproxen (NAPROSYN) 500 mg tablet   No No   Sig: Take 1 tablet (500 mg total) by mouth 2 (two) times a day with meals   predniSONE 20 mg tablet   No No   Sig: Take 3 tablets (60 mg total) by mouth daily for 5 days   sildenafil (VIAGRA) 100 mg tablet  Self Yes No   Sig: Take by mouth   simvastatin (ZOCOR) 40 mg tablet  Self No No   Sig: Take 1 tablet (40 mg total) by mouth daily      Facility-Administered Medications: None       Past Medical History:   Diagnosis Date    Asthma     Colon polyp     Diverticulosis     DVT (deep venous thrombosis) (HCC)     after knee sx    Family hx colonic polyps     mother    Family hx of colon cancer     maternal Aunt, Uncle    Peyronie disease        Past Surgical History:   Procedure Laterality Date    COLONOSCOPY      KNEE ARTHROSCOPY      KNEE SURGERY Left     AK ESOPHAGOGASTRODUODENOSCOPY TRANSORAL DIAGNOSTIC N/A 7/5/2017    Procedure: ESOPHAGOGASTRODUODENOSCOPY (EGD); Surgeon: Gauri Faustin MD;  Location: [facility] GI [facility]; Service: Gastroenterology    SHOULDER SURGERY         Family History   Problem Relation Age of Onset    Other Mother         headaches    Lung cancer Mother     Lymphoma Father         acute    Heart attack Father     Esophageal cancer Father     Colon cancer Family     Other Family         headaches    Skin cancer Family      I have reviewed and agree with the history as documented  E-Cigarette/Vaping     E-Cigarette/Vaping Substances     Social History     Tobacco Use    Smoking status: Never Smoker    Smokeless tobacco: Never Used   Substance Use Topics    Alcohol use: Yes     Comment: rarely    Drug use: No       Review of Systems   Constitutional: Negative for chills, diaphoresis, fatigue and fever     HENT: Negative for congestion, ear pain, mouth sores, rhinorrhea, sinus pain, sore throat and trouble swallowing  Eyes: Negative for photophobia and visual disturbance  Respiratory: Negative for cough, chest tightness, shortness of breath and wheezing  Cardiovascular: Negative for chest pain, palpitations and leg swelling  Gastrointestinal: Negative for abdominal pain, blood in stool, constipation, diarrhea, nausea and vomiting  Genitourinary: Negative for dysuria, flank pain, frequency, hematuria and urgency  Musculoskeletal: Positive for back pain  Negative for arthralgias, gait problem, joint swelling, myalgias and neck pain  Skin: Negative for color change, pallor and rash  Neurological: Negative for dizziness, syncope, speech difficulty, weakness, light-headedness, numbness and headaches  Psychiatric/Behavioral: Negative for confusion and sleep disturbance  All other systems reviewed and are negative  Physical Exam  Physical Exam  Vitals and nursing note reviewed  Constitutional:       General: He is awake  He is not in acute distress  Appearance: Normal appearance  He is well-developed  He is not ill-appearing or diaphoretic  HENT:      Head: Normocephalic and atraumatic  Right Ear: External ear normal       Left Ear: External ear normal       Nose: Nose normal       Mouth/Throat:      Lips: Pink  Mouth: Mucous membranes are moist    Eyes:      General: Lids are normal  No scleral icterus  Conjunctiva/sclera: Conjunctivae normal       Pupils: Pupils are equal, round, and reactive to light  Cardiovascular:      Rate and Rhythm: Normal rate and regular rhythm  Pulses: Normal pulses  Radial pulses are 2+ on the right side and 2+ on the left side  Heart sounds: Normal heart sounds, S1 normal and S2 normal    Pulmonary:      Effort: Pulmonary effort is normal  No accessory muscle usage  Breath sounds: Normal breath sounds  No stridor   No decreased breath sounds, wheezing, rhonchi or rales  Abdominal:      General: Abdomen is flat  Bowel sounds are normal  There is no distension  Palpations: Abdomen is soft  Tenderness: There is no abdominal tenderness  There is no right CVA tenderness, left CVA tenderness, guarding or rebound  Comments: No abdominal pain or CVA tenderness  Musculoskeletal:      Cervical back: Full passive range of motion without pain, normal range of motion and neck supple  No signs of trauma  No pain with movement  Normal range of motion  Lumbar back: Negative right straight leg raise test and negative left straight leg raise test       Right lower leg: No edema  Left lower leg: No edema  Comments: Tenderness to palpation of left-sided lumbar paraspinal muscles  Mild midline tenderness but no deformities or step-offs  Strength 5/5 in bilateral lower extremities  Sensation intact, neurovascularly intact  Lymphadenopathy:      Cervical: No cervical adenopathy  Skin:     General: Skin is warm and dry  Capillary Refill: Capillary refill takes less than 2 seconds  Coloration: Skin is not cyanotic, jaundiced or pale  Neurological:      Mental Status: He is alert and oriented to person, place, and time  GCS: GCS eye subscore is 4  GCS verbal subscore is 5  GCS motor subscore is 6  Cranial Nerves: No dysarthria or facial asymmetry  Gait: Gait normal    Psychiatric:         Attention and Perception: Attention normal          Mood and Affect: Mood normal          Speech: Speech normal          Behavior: Behavior normal  Behavior is cooperative           Vital Signs  ED Triage Vitals [09/21/21 1101]   Temperature Pulse Respirations Blood Pressure SpO2   98 °F (36 7 °C) 70 16 167/67 100 %      Temp Source Heart Rate Source Patient Position - Orthostatic VS BP Location FiO2 (%)   Oral Monitor -- Right arm --      Pain Score       9           Vitals:    09/21/21 1101   BP: 167/67   Pulse: 70         Visual Acuity      ED Medications  Medications   ketorolac (TORADOL) injection 15 mg (15 mg Intramuscular Given 9/21/21 1145)   HYDROcodone-acetaminophen (NORCO) 5-325 mg per tablet 1 tablet (1 tablet Oral Given 9/21/21 1145)   cyclobenzaprine (FLEXERIL) tablet 10 mg (10 mg Oral Given 9/21/21 1259)   ketorolac (TORADOL) injection 15 mg (15 mg Intramuscular Given 9/21/21 1350)       Diagnostic Studies  Results Reviewed     Procedure Component Value Units Date/Time    Urine Microscopic [[id number]]  (Abnormal) Collected: 09/21/21 1352    Lab Status: Final result Specimen: Urine, Clean Catch Updated: 09/21/21 1527     RBC, UA 1-2 /hpf      WBC, UA 0-1 /hpf      Epithelial Cells Occasional /hpf      Bacteria, UA Occasional /hpf     Urine Macroscopic, POC [[id number]]  (Abnormal) Collected: 09/21/21 1352    Lab Status: Final result Specimen: Urine Updated: 09/21/21 1354     Color, UA Yellow     Clarity, UA Clear     pH, UA 7 5     Leukocytes, UA Negative     Nitrite, UA Negative     Protein, UA Negative mg/dl      Glucose, UA Negative mg/dl      Ketones, UA Negative mg/dl      Urobilinogen, UA 0 2 E U /dl      Bilirubin, UA Negative     Blood, UA Trace     Specific [place], UA 1 020    Narrative:      CLINITEK RESULT                 CT lumbar spine without contrast   Final Result by Barbara Hall MD (09/21 1200)      No acute osseous abnormality of lumbar spine  Multilevel degenerative changes of lumbar spine with varying degrees of canal stenosis (mild L3-L4 and L4-L5) and foraminal narrowing (mild-to-moderate left L5-S1), as detailed above  Workstation performed: [id number]                    Procedures  Procedures         ED Course                             SBIRT 22yo+      Most Recent Value   SBIRT (23 yo +)   In order to provide better care to our patients, we are screening all of our patients for alcohol and drug use  Would it be okay to ask you these screening questions?   Unable to answer at this time Filed at: 09/21/2021 1109                    [facility]  Number of Diagnoses or Management Options  Acute low back pain  Degenerative disc disease, lumbar  Diagnosis management comments: Patient is a 70-year-old male presenting to ED for evaluation of back pain x2 weeks  CT showed multilevel degenerative changes of lumbar spine with varying degrees of canal stenosis (mild L3-L4 and L4-L5) and foraminal narrowing (mild-to-moderate left L5-S1)  Patient had some improvement with medications in ED and has minimal pain when lying down  Patient was able to ambulate prior to discharge; discussed option of admission for pain control but patient prefers attempting management with home medications at this time  Discussed returning to the emergency department immediately if new or worsening symptoms occur; discussed the symptoms which are most concerning (saddle anesthesia, urinary or bowel incontinence or retention, changing or worsening pain) that necessitate immediate return  [facility] program referrals were placed and prescription medications sent to patient's pharmacy  The management plan was discussed in detail with the patient at bedside and all questions were answered  Prior to discharge, verbal and written instructions provided  ED return precautions discussed in detail  The patient verbalized understanding of our discussion and plan of care, and agrees to return to the Emergency Department for concerns and progression of illness         Amount and/or Complexity of Data Reviewed  Clinical lab tests: ordered and reviewed  Tests in the radiology section of CPT®: ordered and reviewed    Patient Progress  Patient progress: stable      Disposition  Final diagnoses:   Acute low back pain   Degenerative disc disease, lumbar     Time reflects when diagnosis was documented in both MDM as applicable and the Disposition within this note     Time User Action Codes Description Comment    9/21/2021 12:15 PM Lisa Rodríguez Add [M54 5] Acute low back pain     9/21/2021 12:16 PM Aga Arias Add [M51 36] Degenerative disc disease, lumbar       ED Disposition     ED Disposition Condition Date/Time Comment    Discharge Stable Tue Sep 21, 2021  1:18 PM Regi Garcia discharge to home/self care              Follow-up Information     Follow up With Specialties Details Why Contact Info Additional [address] [facility] Orthopedic Surgery Schedule an appointment as soon as possible for a visit   [address]  [address] [facility] [address]  [phone] [address] [facility], [address], [address] [facility], [place], [place], [address]   Al Rocha MD Family Medicine Schedule an appointment as soon as possible for a visit   [address] [facility] [address]  [address] [facility] Emergency Department Emergency Medicine  If symptoms worsen [facility] [address]  [address] [facility] Emergency Department, [address] [place] [address], [address] Program Physical Therapy Schedule an appointment as soon as possible for a visit   [phone]          Discharge Medication List as of 9/21/2021  1:30 PM      START taking these medications    Details   cyclobenzaprine (FLEXERIL) 10 mg tablet Take 1 tablet (10 mg total) by mouth 2 (two) times a day as needed for muscle spasms, Starting Tue 9/21/2021, Normal      HYDROcodone-acetaminophen (NORCO) 5-325 mg per tablet Take 1 tablet by mouth every 6 (six) hours as needed for pain for up to 5 daysMax Daily Amount: 4 tablets, Starting Tue 9/21/2021, Until Sun 9/26/2021 at 2359, Normal      lidocaine (LIDODERM) 5 % Apply 1 patch topically daily Remove & Discard patch within 12 hours or as directed by MD, Starting Tue 9/21/2021, Normal CONTINUE these medications which have NOT CHANGED    Details   albuterol (PROVENTIL HFA,VENTOLIN HFA) 90 mcg/act inhaler Inhale 2 puffs every 4 (four) hours as needed for wheezing or shortness of breath, Starting Wed 12/2/2020, Normal      loratadine-pseudoephedrine (Wal-itin D 24 Hour)  mg per 24 hr tablet Take 1 tablet by mouth daily, Starting Mon 3/15/2021, Normal      methocarbamol (ROBAXIN) 750 mg tablet Take 1 tablet (750 mg total) by mouth every 8 (eight) hours as needed for muscle spasms, Starting Mon 9/20/2021, Normal      mometasone (ELOCON) 0 1 % cream Apply topically daily, Starting Wed 8/26/2020, Normal      Multiple Vitamin (MULTI-VITAMIN DAILY PO) Take by mouth, Historical Med      naproxen (NAPROSYN) 500 mg tablet Take 1 tablet (500 mg total) by mouth 2 (two) times a day with meals, Starting Mon 9/20/2021, Normal      predniSONE 20 mg tablet Take 3 tablets (60 mg total) by mouth daily for 5 days, Starting Mon 9/20/2021, Until Sat 9/25/2021, Normal      sildenafil (VIAGRA) 100 mg tablet Take by mouth, Starting Wed 7/5/2017, Historical Med      simvastatin (ZOCOR) 40 mg tablet Take 1 tablet (40 mg total) by mouth daily, Starting Tue 9/7/2021, Normal               PDMP Review       Value Time User    PDMP Reviewed  Yes 9/21/2021 11:12 AM Julisa Cam PA-C          ED Provider  Electronically Signed by           Julisa Cam PA-C  09/21/21 2173

## 2021-09-21 NOTE — Clinical Note
Shanthi Romel was seen and treated in our emergency department on 9/21/2021  Diagnosis:     Cathy Moore  may return to work on return date  He may return on this date: 09/24/2021         If you have any questions or concerns, please don't hesitate to call        Mingo Lezama PA-C    ______________________________           _______________          _______________  Hospital Representative                              Date                                Time

## 2021-09-21 NOTE — Clinical Note
Rosiediya Alfred was seen and treated in our emergency department on 9/21/2021  Diagnosis:     Woody Caro  may return to work on return date  He may return on this date: 09/24/2021         If you have any questions or concerns, please don't hesitate to call        Jennifer Beltran PA-C    ______________________________           _______________          _______________  Hospital Representative                              Date                                Time

## 2021-09-22 ENCOUNTER — OFFICE VISIT (OUTPATIENT)
Dept: OBGYN CLINIC | Facility: MEDICAL CENTER | Age: 61
End: 2021-09-22
Payer: COMMERCIAL

## 2021-09-22 VITALS
WEIGHT: 215 LBS | BODY MASS INDEX: 28.49 KG/M2 | DIASTOLIC BLOOD PRESSURE: 84 MMHG | SYSTOLIC BLOOD PRESSURE: 146 MMHG | HEART RATE: 85 BPM | HEIGHT: 73 IN

## 2021-09-22 DIAGNOSIS — M54.50 ACUTE LOW BACK PAIN: ICD-10-CM

## 2021-09-22 DIAGNOSIS — M54.50 ACUTE LEFT-SIDED LOW BACK PAIN WITHOUT SCIATICA: Primary | ICD-10-CM

## 2021-09-22 DIAGNOSIS — M51.36 DEGENERATIVE DISC DISEASE, LUMBAR: ICD-10-CM

## 2021-09-22 DIAGNOSIS — M51.26 LUMBAR DISC HERNIATION: ICD-10-CM

## 2021-09-22 DIAGNOSIS — M47.816 LUMBAR SPONDYLOSIS: ICD-10-CM

## 2021-09-22 PROCEDURE — 99244 OFF/OP CNSLTJ NEW/EST MOD 40: CPT | Performed by: STUDENT IN AN ORGANIZED HEALTH CARE EDUCATION/TRAINING PROGRAM

## 2021-09-22 PROCEDURE — 3008F BODY MASS INDEX DOCD: CPT | Performed by: STUDENT IN AN ORGANIZED HEALTH CARE EDUCATION/TRAINING PROGRAM

## 2021-09-22 PROCEDURE — 1036F TOBACCO NON-USER: CPT | Performed by: STUDENT IN AN ORGANIZED HEALTH CARE EDUCATION/TRAINING PROGRAM

## 2021-09-22 RX ORDER — GABAPENTIN 100 MG/1
100 CAPSULE ORAL 3 TIMES DAILY
Qty: 60 CAPSULE | Refills: 0 | Status: SHIPPED | OUTPATIENT
Start: 2021-09-22 | End: 2021-10-04 | Stop reason: SDUPTHER

## 2021-09-22 NOTE — PROGRESS NOTES
1  Acute left-sided low back pain without sciatica  Ambulatory referral to Pain Management    Ambulatory referral to [facility]    gabapentin (Neurontin) 100 mg capsule   2  Lumbar spondylosis  Ambulatory referral to Pain Management    Ambulatory referral to [facility]    gabapentin (Neurontin) 100 mg capsule   3  Lumbar disc herniation  Ambulatory referral to Pain Management    Ambulatory referral to [facility]    gabapentin (Neurontin) 100 mg capsule     Orders Placed This Encounter   Procedures    Ambulatory referral to Pain Management    Ambulatory referral to [facility]        Imaging Studies (I personally reviewed images in PACS and report):       CT lumbar spine without contrast 09/21/2021:  No acute osseous abnormality of lumbar spine      Multilevel degenerative changes of lumbar spine with varying degrees of canal stenosis (mild L3-L4 and L4-L5) and foraminal narrowing (mild-to-moderate left L5-S1), as detailed above  ·   X-ray lumbar spine 09/10/2021:  There is no evidence of acute fracture or destructive osseous lesion      L1-L2 through L5-S1 slight retrolisthesis  Anatomic alignment otherwise  Slight levoscoliosis suggested       L5-S1 posterior facet arthrosis  L1-L2, L2-L3 mild, L5-S1 moderate disc space narrowing  L1-L2 through L5-S1 small osteophytes  IMPRESSION:   Acute axial midline and left lumbar back pain without radiculopathy - negative SLR on exam today   No red flags on exam of bowel/bladder incontinence     No precipitating injury other than progressively experiencing back tightness which progressively worsened and radiates to left lumbar and hip   Multilevel lumbar spondylosis   Diffuse multilevel lumbar herniations with mild/moderate foraminla canal stenosis     Interventions:  Currently on day 2 of 5-day prednisone   Flexeril QHS  Norco  Aceatminophen/Naproxen (not taking naproxen while on prednisone)  Heat/ice    Has not seen formal PT for this issue yet     consultation evaluation    PLAN:     Clinical exam and radiographic imaging reviewed with patient today, with impression as per above  I have discussed with the patient the pathophysiology of this diagnosis and reviewed how the examination correlates with this diagnosis   I recommended patient complete prednisone treatment and not to concurrently take NSAIDs with this medication  He can take NSAIDs once he completes prednisone therapy  He can concurrently take acetaminophen 500-1000 mg p o  q 6 -8 hours, Flexeril 10 mg p o  q h s   I have also prescribed gabapentin 100 mg p o  q h s  and can taper off this medication up to t i d  every 4 days as needed for pain   I discussed with patient that back pain is typically treated conservatively with formal physical therapy and that starting any kind of passive / active range of motion movements can benefit him despite how limited he is today with range of motion  As he has difficulty doing home exercises on his own at this time, I have referred him to comprehensive Spine PT to facilitate recovery    I have also referred him to pain management to discuss other treatment modalities if patient is unable to tolerate formal physical therapy  He has a lumbar MRI appointment scheduled on 10/03/2021, I will reach out to our clinical manager whether not this appointment can be moved up given the severity of pain reported by the patient  Return for Follow up with pain management after MRI  CHIEF COMPLAINT:  Back pain    HPI:  Jeffry Jack is a 64 y o  male  who presents with his significant other in regards to a referral from Mateusz Luciano PA-C, for       Visit 9/22/2021 :  Initial evaluation of back pain:  Patient states this has been an ongoing issue for approximately 3 weeks  He states there was no precipitating injury, but did feel some paralumbar back tightness initially from bending over    He states his tightness progressively worsened to a point where he woke up 1 day and was unable to move due to the severity of his pain  He states pain is located his midline and left paralumbar aspect of his back, is progressively radiated to his left hip and posterior thigh but does not radiate down to his foot  He describes as a tight/throbbing / spasming/aching pain of severe intensity  He feels the pain is constant and aggravated when attempting to transition from lying to sitting or sitting to standing  He states he only has mild alleviation when lying flat and still  He denies any bowel / bladder incontinence, lower extremity numbness/ tingling, weakness  Denies fevers/ chills, nausea / vomiting, unintentional weight loss, and other ROS as per below  He denies having back pain similar in the past   He denies prior injury/surgeries of his back in the past     He had initially seen his PCP for this issue which he was placed on muscle relaxers and NSAIDs  He states there is no relief with this  He then subsequently had imaging done and was put on 5 days of prednisone in which he is currently on day 2  He states that he still did not feel much relief with this and so all the ER yesterday in which he had a CT of his lumbar spine done as noted above  He was then prescribed an alternative muscle relaxer, Lidoderm patches  He states he has been alternating with heat/ ice therapy all with minimal to no relief with any of these interventions  He has not seen formal physical therapy for this issue yet as he is unsure whether therapy could alleviate his symptoms since he has difficulty moving  Patient has an MRI lumbar spine scheduled for 10/03/2021    Review of Systems   Constitutional: Negative for chills, fever and unexpected weight change  HENT: Negative for sore throat  Respiratory: Negative for cough, shortness of breath and wheezing  Gastrointestinal: Negative for abdominal pain, nausea and vomiting  Denies bowel incontinence   Genitourinary:        Denies urinary incontinence   Musculoskeletal:        As per HPI   Skin: Negative for rash  Neurological:        As per HPI         Medical, Surgical, Family, and Social History    Past Medical History:   Diagnosis Date    Asthma     Colon polyp     Diverticulosis     DVT (deep venous thrombosis) ([facility] [address] )     after knee sx    Family hx colonic polyps     mother    Family hx of colon cancer     maternal Aunt, Uncle    Peyronie disease      Past Surgical History:   Procedure Laterality Date    COLONOSCOPY      KNEE ARTHROSCOPY      KNEE SURGERY Left     [place] ESOPHAGOGASTRODUODENOSCOPY TRANSORAL DIAGNOSTIC N/A 7/5/2017    Procedure: ESOPHAGOGASTRODUODENOSCOPY (EGD); Surgeon: Nay Garg MD;  Location: [facility] GI [facility];   Service: Gastroenterology    SHOULDER SURGERY       Social History   Social History     Substance and Sexual Activity   Alcohol Use Yes    Comment: rarely     Social History     Substance and Sexual Activity   Drug Use No     Social History     Tobacco Use   Smoking Status Never Smoker   Smokeless Tobacco Never Used     Family History   Problem Relation Age of Onset    Other Mother         headaches    Lung cancer Mother     Lymphoma Father         acute    Heart attack Father     Esophageal cancer Father     Colon cancer Family     Other Family         headaches    Skin cancer Family      Allergies   Allergen Reactions    Bee Pollen     Cat Hair Extract     Pollen Extract           Physical Exam  /84   Pulse 85   Ht 6' 1" (1 854 m)   Wt 97 5 kg (215 lb)   BMI 28 37 kg/m²     Constitutional:  see vital signs  Gen: well-developed, moderate distress and lying flat on examination table (patient was brought in a wheelchair while sitting up uncomfortably and extending his lumbar back to avoid flexion)  Eyes: No inflammation or discharge of conjunctiva or lids; sclera clear   Pharynx: no inflammation, lesion, or mass of lips  Neck: supple, no masses, non-distended  MSK: no inflammation, lesion, mass, or clubbing of nails and digits except for other than mentioned below  SKIN: no visible rashes or skin lesions  Pulmonary/Chest: Effort normal  No respiratory distress  NEURO: cranial nerves grossly intact  PSYCH:  Alert and oriented to person, place, and time; recent and remote memory intact; mood normal, no depression, anxiety, or agitation, judgment and insight good and intact     Ortho Exam  BACK EXAM:  Gait: Patient able to stand with antalgia, prefers to not ambulate as taking steps exacerbate his paralumbar pain    BACK TENDERNESS:  Spinous Processes: +lower lumbar  Paraspinal Muscles: +left paralumbar  SI Joint: no  Sacrum: no    ROM:    This exam was limited as patient could only tolerate lying flat during our examination  At the end of our visit patient was able to sit up with discomfort but I was unable to evaluate his full flexion/ extension / rotation/ lateral flexion range of motions today      DERMATOMAL SENSATION:  L1: normal   L2: normal   L3: normal   L4: normal   L5: normal   S1: normal    STRENGTH (bilateral):  Knee Extension: 5-/5  Knee Flexion: 4+/5  Foot Dorsiflexion: 5/5  Great Toe Extension: 5/5  Foot Plantarflexion: 5/5  Hip Flexion: 4+/5    REFLEXES:  Patellar: 2+ bilateral  Achilles: 2+ bilateral  Clonus: negative bilateral    BACK:   SUPINE STRAIGHT LEG: negative    HIP:  LOG ROLL: negative  JERRY: negative  FADIR: negative

## 2021-09-22 NOTE — LETTER
September 23, 2021     Rosemary Cantrell MD  [address]  [facility] [address]    Patient: Tello Aguilar   YOB: 1960   Date of Visit: 9/22/2021       Dear Dr Kalli Robb: Thank you for referring Gabrielapk Dorantes to me for evaluation  Below are my notes for this consultation  If you have questions, please do not hesitate to call me  I look forward to following your patient along with you  Sincerely,        Keli Lux MD        CC: No Recipients  Keli Lux MD  9/23/2021  7:54 AM  Signed  1  Acute left-sided low back pain without sciatica  Ambulatory referral to Pain Management    Ambulatory referral to [facility]    gabapentin (Neurontin) 100 mg capsule   2  Lumbar spondylosis  Ambulatory referral to Pain Management    Ambulatory referral to [facility]    gabapentin (Neurontin) 100 mg capsule   3  Lumbar disc herniation  Ambulatory referral to Pain Management    Ambulatory referral to [facility]    gabapentin (Neurontin) 100 mg capsule     Orders Placed This Encounter   Procedures    Ambulatory referral to Pain Management    Ambulatory referral to [facility]        Imaging Studies (I personally reviewed images in PACS and report):       CT lumbar spine without contrast 09/21/2021:  No acute osseous abnormality of lumbar spine      Multilevel degenerative changes of lumbar spine with varying degrees of canal stenosis (mild L3-L4 and L4-L5) and foraminal narrowing (mild-to-moderate left L5-S1), as detailed above  ·   X-ray lumbar spine 09/10/2021:  There is no evidence of acute fracture or destructive osseous lesion      L1-L2 through L5-S1 slight retrolisthesis  Anatomic alignment otherwise  Slight levoscoliosis suggested       L5-S1 posterior facet arthrosis  L1-L2, L2-L3 mild, L5-S1 moderate disc space narrowing  L1-L2 through L5-S1 small osteophytes      IMPRESSION:   Acute axial midline and left lumbar back pain without radiculopathy - negative SLR on exam today   No red flags on exam of bowel/bladder incontinence   No precipitating injury other than progressively experiencing back tightness which progressively worsened and radiates to left lumbar and hip   Multilevel lumbar spondylosis   Diffuse multilevel lumbar herniations with mild/moderate foraminla canal stenosis     Interventions:  Currently on day 2 of 5-day prednisone   Flexeril QHS  Norco  Aceatminophen/Naproxen (not taking naproxen while on prednisone)  Heat/ice    Has not seen formal PT for this issue yet     consultation evaluation    PLAN:     Clinical exam and radiographic imaging reviewed with patient today, with impression as per above  I have discussed with the patient the pathophysiology of this diagnosis and reviewed how the examination correlates with this diagnosis   I recommended patient complete prednisone treatment and not to concurrently take NSAIDs with this medication  He can take NSAIDs once he completes prednisone therapy  He can concurrently take acetaminophen 500-1000 mg p o  q 6 -8 hours, Flexeril 10 mg p o  q h s   I have also prescribed gabapentin 100 mg p o  q h s  and can taper off this medication up to t i d  every 4 days as needed for pain   I discussed with patient that back pain is typically treated conservatively with formal physical therapy and that starting any kind of passive / active range of motion movements can benefit him despite how limited he is today with range of motion  As he has difficulty doing home exercises on his own at this time, I have referred him to comprehensive Spine PT to facilitate recovery    I have also referred him to pain management to discuss other treatment modalities if patient is unable to tolerate formal physical therapy    He has a lumbar MRI appointment scheduled on 10/03/2021, I will reach out to our clinical manager whether not this appointment can be moved up given the severity of pain reported by the patient  Return for Follow up with pain management after MRI  CHIEF COMPLAINT:  Back pain    HPI:  Jahaira Atkins is a 64 y o  male  who presents with his significant other in regards to a referral from Luna Ibarra PA-C, for       Visit 9/22/2021 :  Initial evaluation of back pain:  Patient states this has been an ongoing issue for approximately 3 weeks  He states there was no precipitating injury, but did feel some paralumbar back tightness initially from bending over  He states his tightness progressively worsened to a point where he woke up 1 day and was unable to move due to the severity of his pain  He states pain is located his midline and left paralumbar aspect of his back, is progressively radiated to his left hip and posterior thigh but does not radiate down to his foot  He describes as a tight/throbbing / spasming/aching pain of severe intensity  He feels the pain is constant and aggravated when attempting to transition from lying to sitting or sitting to standing  He states he only has mild alleviation when lying flat and still  He denies any bowel / bladder incontinence, lower extremity numbness/ tingling, weakness  Denies fevers/ chills, nausea / vomiting, unintentional weight loss, and other ROS as per below  He denies having back pain similar in the past   He denies prior injury/surgeries of his back in the past     He had initially seen his PCP for this issue which he was placed on muscle relaxers and NSAIDs  He states there is no relief with this  He then subsequently had imaging done and was put on 5 days of prednisone in which he is currently on day 2  He states that he still did not feel much relief with this and so all the ER yesterday in which he had a CT of his lumbar spine done as noted above  He was then prescribed an alternative muscle relaxer, Lidoderm patches     He states he has been alternating with heat/ ice therapy all with minimal to no relief with any of these interventions  He has not seen formal physical therapy for this issue yet as he is unsure whether therapy could alleviate his symptoms since he has difficulty moving  Patient has an MRI lumbar spine scheduled for 10/03/2021    Review of Systems   Constitutional: Negative for chills, fever and unexpected weight change  HENT: Negative for sore throat  Respiratory: Negative for cough, shortness of breath and wheezing  Gastrointestinal: Negative for abdominal pain, nausea and vomiting  Denies bowel incontinence   Genitourinary:        Denies urinary incontinence   Musculoskeletal:        As per HPI   Skin: Negative for rash  Neurological:        As per HPI         Medical, Surgical, Family, and Social History    Past Medical History:   Diagnosis Date    Asthma     Colon polyp     Diverticulosis     DVT (deep venous thrombosis) ([facility] [address] )     after knee sx    Family hx colonic polyps     mother    Family hx of colon cancer     maternal Aunt, Uncle    Peyronie disease      Past Surgical History:   Procedure Laterality Date    COLONOSCOPY      KNEE ARTHROSCOPY      KNEE SURGERY Left     [place] ESOPHAGOGASTRODUODENOSCOPY TRANSORAL DIAGNOSTIC N/A 7/5/2017    Procedure: ESOPHAGOGASTRODUODENOSCOPY (EGD); Surgeon: Marshall Dietz MD;  Location: [facility] GI LAB;   Service: Gastroenterology    SHOULDER SURGERY       Social History   Social History     Substance and Sexual Activity   Alcohol Use Yes    Comment: rarely     Social History     Substance and Sexual Activity   Drug Use No     Social History     Tobacco Use   Smoking Status Never Smoker   Smokeless Tobacco Never Used     Family History   Problem Relation Age of Onset    Other Mother         headaches    Lung cancer Mother     Lymphoma Father         acute    Heart attack Father     Esophageal cancer Father     Colon cancer Family     Other Family         headaches    Skin cancer Family      Allergies Allergen Reactions    Bee Pollen     Cat Hair Extract     Pollen Extract           Physical Exam  /84   Pulse 85   Ht 6' 1" (1 854 m)   Wt 97 5 kg (215 lb)   BMI 28 37 kg/m²     Constitutional:  see vital signs  Gen: well-developed, moderate distress and lying flat on examination table (patient was brought in a wheelchair while sitting up uncomfortably and extending his lumbar back to avoid flexion)  Eyes: No inflammation or discharge of conjunctiva or lids; sclera clear   Pharynx: no inflammation, lesion, or mass of lips  Neck: supple, no masses, non-distended  MSK: no inflammation, lesion, mass, or clubbing of nails and digits except for other than mentioned below  SKIN: no visible rashes or skin lesions  Pulmonary/Chest: Effort normal  No respiratory distress  NEURO: cranial nerves grossly intact  PSYCH:  Alert and oriented to person, place, and time; recent and remote memory intact; mood normal, no depression, anxiety, or agitation, judgment and insight good and intact     Ortho Exam  BACK EXAM:  Gait: Patient able to stand with antalgia, prefers to not ambulate as taking steps exacerbate his paralumbar pain    BACK TENDERNESS:  Spinous Processes: +lower lumbar  Paraspinal Muscles: +left paralumbar  SI Joint: no  Sacrum: no    ROM:    This exam was limited as patient could only tolerate lying flat during our examination  At the end of our visit patient was able to sit up with discomfort but I was unable to evaluate his full flexion/ extension / rotation/ lateral flexion range of motions today      DERMATOMAL SENSATION:  L1: normal   L2: normal   L3: normal   L4: normal   L5: normal   S1: normal    STRENGTH (bilateral):  Knee Extension: 5-/5  Knee Flexion: 4+/5  Foot Dorsiflexion: 5/5  Great Toe Extension: 5/5  Foot Plantarflexion: 5/5  Hip Flexion: 4+/5    REFLEXES:  Patellar: 2+ bilateral  Achilles: 2+ bilateral  Clonus: negative bilateral    BACK:   SUPINE STRAIGHT LEG: negative    HIP:  LOG ROLL: negative  JERRY: negative  FADIR: negative

## 2021-09-24 ENCOUNTER — NURSE TRIAGE (OUTPATIENT)
Dept: PHYSICAL THERAPY | Facility: OTHER | Age: 61
End: 2021-09-24

## 2021-09-24 ENCOUNTER — TELEPHONE (OUTPATIENT)
Dept: OBGYN CLINIC | Facility: HOSPITAL | Age: 61
End: 2021-09-24

## 2021-09-24 DIAGNOSIS — M54.50 ACUTE LEFT-SIDED LOW BACK PAIN WITHOUT SCIATICA: Primary | ICD-10-CM

## 2021-09-24 NOTE — TELEPHONE ENCOUNTER
Additional Information   Negative: Is this related to a work injury?  Negative: Is this related to an MVA?  Negative: Are you currently recieving homecare services?  Negative: Has the patient had unexplained weight loss?  Negative: Is the patient experiencing blood in sputum?  Negative: Does the patient have a fever?  Negative: Is the patient experiencing urine retention?  Negative: Is the patient experiencing acute drop foot or paralysis?  Negative: Has the patient experienced major trauma? (fall from height, high speed collision, direct blow to spine) and is also experiencing nausea, light-headedness, or loss of consciousness?  Negative: Is this a chronic condition? Background - Initial Assessment  Clinical complaint: left lower back pain  Non radiating no numbness or tingling  States started  9/11/21  No injury or incident noted  No history of back issues  Date of onset: 9/11/21  Frequency of pain: constant  Quality of pain: aching and sharp    Protocols used: [facility] COMPREHENSIVE SPINE PROGRAM PROTOCOL    This RN did review in detail the Comprehensive [facility] Program and what we can provide for their back pain  Patient is agreeable to being triaged by this RN and would like to proceed with Physical Therapy  Referral was placed for Physical Therapy at the [facility] site  Patients information was sent to the  to make evaluation appointment  Patient made aware that the PT office  will be calling to schedule the appointment  Patient was provided with the phone number to the PT office  No further questions and/or concerns were voiced by the patient at this time  Patient states understanding of the referral that was placed  Referral Closed

## 2021-09-24 NOTE — TELEPHONE ENCOUNTER
Dr Salinas Zhong    Patient asked when he should be contacting Pain Management, the office visit note states to contact them if he cant tolerate the PT       # [phone]

## 2021-09-27 ENCOUNTER — HOSPITAL ENCOUNTER (EMERGENCY)
Facility: HOSPITAL | Age: 61
Discharge: HOME | End: 2021-09-27
Attending: EMERGENCY MEDICINE
Payer: COMMERCIAL

## 2021-09-27 VITALS
OXYGEN SATURATION: 99 % | WEIGHT: 206.35 LBS | BODY MASS INDEX: 27.22 KG/M2 | RESPIRATION RATE: 18 BRPM | SYSTOLIC BLOOD PRESSURE: 153 MMHG | HEART RATE: 80 BPM | DIASTOLIC BLOOD PRESSURE: 91 MMHG | TEMPERATURE: 98.6 F

## 2021-09-27 DIAGNOSIS — M54.16 LUMBAR RADICULOPATHY, ACUTE: ICD-10-CM

## 2021-09-27 DIAGNOSIS — M54.50 ACUTE LOW BACK PAIN: Primary | ICD-10-CM

## 2021-09-27 PROCEDURE — 99283 EMERGENCY DEPT VISIT LOW MDM: CPT

## 2021-09-27 PROCEDURE — 96372 THER/PROPH/DIAG INJ SC/IM: CPT

## 2021-09-27 PROCEDURE — 99285 EMERGENCY DEPT VISIT HI MDM: CPT | Performed by: EMERGENCY MEDICINE

## 2021-09-27 RX ORDER — HYDROMORPHONE HCL/PF 1 MG/ML
1 SYRINGE (ML) INJECTION ONCE
Status: COMPLETED | OUTPATIENT
Start: 2021-09-27 | End: 2021-09-27

## 2021-09-27 RX ORDER — OXYCODONE HYDROCHLORIDE AND ACETAMINOPHEN 5; 325 MG/1; MG/1
1 TABLET ORAL EVERY 6 HOURS PRN
Qty: 12 TABLET | Refills: 0 | Status: SHIPPED | OUTPATIENT
Start: 2021-09-27 | End: 2021-10-04 | Stop reason: SDUPTHER

## 2021-09-27 RX ORDER — KETOROLAC TROMETHAMINE 30 MG/ML
30 INJECTION, SOLUTION INTRAMUSCULAR; INTRAVENOUS ONCE
Status: COMPLETED | OUTPATIENT
Start: 2021-09-27 | End: 2021-09-27

## 2021-09-27 RX ADMIN — HYDROMORPHONE HYDROCHLORIDE 1 MG: 1 INJECTION, SOLUTION INTRAMUSCULAR; INTRAVENOUS; SUBCUTANEOUS at 10:31

## 2021-09-27 RX ADMIN — KETOROLAC TROMETHAMINE 30 MG: 30 INJECTION, SOLUTION INTRAMUSCULAR at 10:30

## 2021-10-02 ENCOUNTER — TELEPHONE (OUTPATIENT)
Dept: FAMILY MEDICINE CLINIC | Facility: CLINIC | Age: 61
End: 2021-10-02

## 2021-10-03 ENCOUNTER — HOSPITAL ENCOUNTER (OUTPATIENT)
Dept: RADIOLOGY | Facility: HOSPITAL | Age: 61
Discharge: HOME | End: 2021-10-03

## 2021-10-03 ENCOUNTER — HOSPITAL ENCOUNTER (OUTPATIENT)
Dept: MRI IMAGING | Facility: HOSPITAL | Age: 61
Discharge: HOME | End: 2021-10-03
Payer: COMMERCIAL

## 2021-10-03 DIAGNOSIS — M54.50 ACUTE LEFT-SIDED LOW BACK PAIN WITHOUT SCIATICA: ICD-10-CM

## 2021-10-03 DIAGNOSIS — R93.7 ABNORMAL X-RAY OF LUMBAR SPINE: ICD-10-CM

## 2021-10-03 DIAGNOSIS — T15.90XA FOREIGN BODY ACCIDENTALLY ENTERING EYE AND ADNEXA: ICD-10-CM

## 2021-10-03 PROCEDURE — 72148 MRI LUMBAR SPINE W/O DYE: CPT

## 2021-10-03 PROCEDURE — G1004 CDSM NDSC: HCPCS

## 2021-10-04 ENCOUNTER — OFFICE VISIT (OUTPATIENT)
Dept: FAMILY MEDICINE CLINIC | Facility: CLINIC | Age: 61
End: 2021-10-04
Payer: COMMERCIAL

## 2021-10-04 ENCOUNTER — EVALUATION (OUTPATIENT)
Dept: PHYSICAL THERAPY | Facility: CLINIC | Age: 61
End: 2021-10-04
Payer: COMMERCIAL

## 2021-10-04 ENCOUNTER — TELEPHONE (OUTPATIENT)
Dept: PAIN MEDICINE | Facility: MEDICAL CENTER | Age: 61
End: 2021-10-04

## 2021-10-04 VITALS
RESPIRATION RATE: 16 BRPM | OXYGEN SATURATION: 98 % | HEART RATE: 92 BPM | DIASTOLIC BLOOD PRESSURE: 60 MMHG | HEIGHT: 73 IN | WEIGHT: 197 LBS | BODY MASS INDEX: 26.11 KG/M2 | SYSTOLIC BLOOD PRESSURE: 112 MMHG

## 2021-10-04 DIAGNOSIS — M51.9 FORAMINAL STENOSIS DUE TO INTERVERTEBRAL DISC DISEASE: Primary | ICD-10-CM

## 2021-10-04 DIAGNOSIS — M99.79 FORAMINAL STENOSIS DUE TO INTERVERTEBRAL DISC DISEASE: Primary | ICD-10-CM

## 2021-10-04 DIAGNOSIS — M54.50 ACUTE LEFT-SIDED LOW BACK PAIN WITHOUT SCIATICA: ICD-10-CM

## 2021-10-04 DIAGNOSIS — M47.816 LUMBAR SPONDYLOSIS: ICD-10-CM

## 2021-10-04 DIAGNOSIS — M51.26 LUMBAR DISC HERNIATION: ICD-10-CM

## 2021-10-04 DIAGNOSIS — M54.50 ACUTE LOW BACK PAIN: ICD-10-CM

## 2021-10-04 DIAGNOSIS — M54.16 LUMBAR RADICULOPATHY, ACUTE: ICD-10-CM

## 2021-10-04 PROCEDURE — 99214 OFFICE O/P EST MOD 30 MIN: CPT | Performed by: FAMILY MEDICINE

## 2021-10-04 PROCEDURE — 97112 NEUROMUSCULAR REEDUCATION: CPT | Performed by: PHYSICAL THERAPIST

## 2021-10-04 PROCEDURE — 97162 PT EVAL MOD COMPLEX 30 MIN: CPT | Performed by: PHYSICAL THERAPIST

## 2021-10-04 RX ORDER — OXYCODONE HYDROCHLORIDE AND ACETAMINOPHEN 5; 325 MG/1; MG/1
1 TABLET ORAL EVERY 6 HOURS PRN
Qty: 30 TABLET | Refills: 0 | Status: SHIPPED | OUTPATIENT
Start: 2021-10-04 | End: 2021-12-02

## 2021-10-04 RX ORDER — PREDNISONE 10 MG/1
TABLET ORAL
Qty: 50 TABLET | Refills: 0 | Status: SHIPPED | OUTPATIENT
Start: 2021-10-04 | End: 2021-10-28

## 2021-10-04 RX ORDER — GABAPENTIN 300 MG/1
300 CAPSULE ORAL 2 TIMES DAILY
Qty: 60 CAPSULE | Refills: 0 | Status: SHIPPED | OUTPATIENT
Start: 2021-10-04 | End: 2021-11-26 | Stop reason: SDUPTHER

## 2021-10-06 ENCOUNTER — TELEPHONE (OUTPATIENT)
Dept: PHYSICAL THERAPY | Facility: OTHER | Age: 61
End: 2021-10-06

## 2021-10-07 ENCOUNTER — OFFICE VISIT (OUTPATIENT)
Dept: PHYSICAL THERAPY | Facility: CLINIC | Age: 61
End: 2021-10-07
Payer: COMMERCIAL

## 2021-10-07 DIAGNOSIS — M54.50 ACUTE LEFT-SIDED LOW BACK PAIN WITHOUT SCIATICA: Primary | ICD-10-CM

## 2021-10-07 PROCEDURE — G0283 ELEC STIM OTHER THAN WOUND: HCPCS | Performed by: PHYSICAL THERAPIST

## 2021-10-07 PROCEDURE — 97140 MANUAL THERAPY 1/> REGIONS: CPT | Performed by: PHYSICAL THERAPIST

## 2021-10-07 PROCEDURE — 97010 HOT OR COLD PACKS THERAPY: CPT | Performed by: PHYSICAL THERAPIST

## 2021-10-07 PROCEDURE — 97112 NEUROMUSCULAR REEDUCATION: CPT | Performed by: PHYSICAL THERAPIST

## 2021-10-07 PROCEDURE — 97014 ELECTRIC STIMULATION THERAPY: CPT | Performed by: PHYSICAL THERAPIST

## 2021-10-07 PROCEDURE — 97110 THERAPEUTIC EXERCISES: CPT | Performed by: PHYSICAL THERAPIST

## 2021-10-11 ENCOUNTER — OFFICE VISIT (OUTPATIENT)
Dept: PHYSICAL THERAPY | Facility: CLINIC | Age: 61
End: 2021-10-11
Payer: COMMERCIAL

## 2021-10-11 DIAGNOSIS — M54.50 ACUTE LEFT-SIDED LOW BACK PAIN WITHOUT SCIATICA: Primary | ICD-10-CM

## 2021-10-11 PROCEDURE — 97010 HOT OR COLD PACKS THERAPY: CPT

## 2021-10-11 PROCEDURE — 97140 MANUAL THERAPY 1/> REGIONS: CPT

## 2021-10-11 PROCEDURE — 97112 NEUROMUSCULAR REEDUCATION: CPT

## 2021-10-11 PROCEDURE — G0283 ELEC STIM OTHER THAN WOUND: HCPCS

## 2021-10-11 PROCEDURE — 97110 THERAPEUTIC EXERCISES: CPT

## 2021-10-11 PROCEDURE — 97014 ELECTRIC STIMULATION THERAPY: CPT

## 2021-10-13 ENCOUNTER — CONSULT (OUTPATIENT)
Dept: PAIN MEDICINE | Facility: CLINIC | Age: 61
End: 2021-10-13
Payer: COMMERCIAL

## 2021-10-13 VITALS
BODY MASS INDEX: 26.11 KG/M2 | TEMPERATURE: 97.9 F | HEART RATE: 94 BPM | WEIGHT: 197 LBS | HEIGHT: 73 IN | DIASTOLIC BLOOD PRESSURE: 70 MMHG | SYSTOLIC BLOOD PRESSURE: 106 MMHG

## 2021-10-13 DIAGNOSIS — M48.061 LUMBAR FORAMINAL STENOSIS: Primary | ICD-10-CM

## 2021-10-13 DIAGNOSIS — M54.16 LUMBAR RADICULOPATHY: ICD-10-CM

## 2021-10-13 DIAGNOSIS — M51.26 LUMBAR DISC HERNIATION: ICD-10-CM

## 2021-10-13 PROCEDURE — 99244 OFF/OP CNSLTJ NEW/EST MOD 40: CPT | Performed by: ANESTHESIOLOGY

## 2021-10-14 ENCOUNTER — OFFICE VISIT (OUTPATIENT)
Dept: PHYSICAL THERAPY | Facility: CLINIC | Age: 61
End: 2021-10-14
Payer: COMMERCIAL

## 2021-10-14 DIAGNOSIS — M54.50 ACUTE LEFT-SIDED LOW BACK PAIN WITHOUT SCIATICA: Primary | ICD-10-CM

## 2021-10-14 PROCEDURE — 97140 MANUAL THERAPY 1/> REGIONS: CPT

## 2021-10-14 PROCEDURE — 97110 THERAPEUTIC EXERCISES: CPT

## 2021-10-14 PROCEDURE — 97112 NEUROMUSCULAR REEDUCATION: CPT

## 2021-10-15 ENCOUNTER — TELEPHONE (OUTPATIENT)
Dept: PAIN MEDICINE | Facility: CLINIC | Age: 61
End: 2021-10-15

## 2021-10-18 ENCOUNTER — HOSPITAL ENCOUNTER (OUTPATIENT)
Dept: RADIOLOGY | Facility: CLINIC | Age: 61
Discharge: HOME | End: 2021-10-18
Attending: ANESTHESIOLOGY
Payer: COMMERCIAL

## 2021-10-18 ENCOUNTER — APPOINTMENT (OUTPATIENT)
Dept: PHYSICAL THERAPY | Facility: CLINIC | Age: 61
End: 2021-10-18
Payer: COMMERCIAL

## 2021-10-18 ENCOUNTER — TELEPHONE (OUTPATIENT)
Dept: FAMILY MEDICINE CLINIC | Facility: CLINIC | Age: 61
End: 2021-10-18

## 2021-10-18 VITALS
TEMPERATURE: 97.3 F | RESPIRATION RATE: 20 BRPM | SYSTOLIC BLOOD PRESSURE: 112 MMHG | HEART RATE: 85 BPM | DIASTOLIC BLOOD PRESSURE: 78 MMHG | OXYGEN SATURATION: 94 %

## 2021-10-18 DIAGNOSIS — M48.061 LUMBAR FORAMINAL STENOSIS: ICD-10-CM

## 2021-10-18 DIAGNOSIS — M54.16 LUMBAR RADICULOPATHY: ICD-10-CM

## 2021-10-18 DIAGNOSIS — M51.26 LUMBAR DISC HERNIATION: ICD-10-CM

## 2021-10-18 PROCEDURE — 62323 NJX INTERLAMINAR LMBR/SAC: CPT | Performed by: ANESTHESIOLOGY

## 2021-10-18 RX ORDER — METHYLPREDNISOLONE ACETATE 80 MG/ML
80 INJECTION, SUSPENSION INTRA-ARTICULAR; INTRALESIONAL; INTRAMUSCULAR; PARENTERAL; SOFT TISSUE ONCE
Status: COMPLETED | OUTPATIENT
Start: 2021-10-18 | End: 2021-10-18

## 2021-10-18 RX ADMIN — IOHEXOL 2.5 ML: 300 INJECTION, SOLUTION INTRAVENOUS at 14:30

## 2021-10-18 RX ADMIN — METHYLPREDNISOLONE ACETATE 80 MG: 80 INJECTION, SUSPENSION INTRA-ARTICULAR; INTRALESIONAL; INTRAMUSCULAR; SOFT TISSUE at 14:30

## 2021-10-21 ENCOUNTER — OFFICE VISIT (OUTPATIENT)
Dept: PHYSICAL THERAPY | Facility: CLINIC | Age: 61
End: 2021-10-21
Payer: COMMERCIAL

## 2021-10-21 DIAGNOSIS — M54.50 ACUTE LEFT-SIDED LOW BACK PAIN WITHOUT SCIATICA: Primary | ICD-10-CM

## 2021-10-21 PROCEDURE — G0283 ELEC STIM OTHER THAN WOUND: HCPCS | Performed by: PHYSICAL THERAPIST

## 2021-10-21 PROCEDURE — 97530 THERAPEUTIC ACTIVITIES: CPT | Performed by: PHYSICAL THERAPIST

## 2021-10-21 PROCEDURE — 97010 HOT OR COLD PACKS THERAPY: CPT | Performed by: PHYSICAL THERAPIST

## 2021-10-21 PROCEDURE — 97110 THERAPEUTIC EXERCISES: CPT | Performed by: PHYSICAL THERAPIST

## 2021-10-21 PROCEDURE — 97014 ELECTRIC STIMULATION THERAPY: CPT | Performed by: PHYSICAL THERAPIST

## 2021-10-21 PROCEDURE — 97112 NEUROMUSCULAR REEDUCATION: CPT | Performed by: PHYSICAL THERAPIST

## 2021-10-25 ENCOUNTER — TELEPHONE (OUTPATIENT)
Dept: PAIN MEDICINE | Facility: CLINIC | Age: 61
End: 2021-10-25

## 2021-10-25 ENCOUNTER — OFFICE VISIT (OUTPATIENT)
Dept: PHYSICAL THERAPY | Facility: CLINIC | Age: 61
End: 2021-10-25
Payer: COMMERCIAL

## 2021-10-25 DIAGNOSIS — M54.50 ACUTE LEFT-SIDED LOW BACK PAIN WITHOUT SCIATICA: Primary | ICD-10-CM

## 2021-10-25 PROCEDURE — 97110 THERAPEUTIC EXERCISES: CPT | Performed by: PHYSICAL THERAPIST

## 2021-10-25 PROCEDURE — 97112 NEUROMUSCULAR REEDUCATION: CPT | Performed by: PHYSICAL THERAPIST

## 2021-10-25 NOTE — TELEPHONE ENCOUNTER
1st attempt  Lm to cb with % improvement and pain level.       S/p LESI to the left 1 10/18, 11/18 LESI #2

## 2021-10-28 ENCOUNTER — OFFICE VISIT (OUTPATIENT)
Dept: PHYSICAL THERAPY | Facility: CLINIC | Age: 61
End: 2021-10-28
Payer: COMMERCIAL

## 2021-10-28 ENCOUNTER — OFFICE VISIT (OUTPATIENT)
Dept: FAMILY MEDICINE CLINIC | Facility: CLINIC | Age: 61
End: 2021-10-28
Payer: COMMERCIAL

## 2021-10-28 VITALS
DIASTOLIC BLOOD PRESSURE: 70 MMHG | HEIGHT: 73 IN | RESPIRATION RATE: 18 BRPM | HEART RATE: 88 BPM | WEIGHT: 204 LBS | BODY MASS INDEX: 27.04 KG/M2 | SYSTOLIC BLOOD PRESSURE: 102 MMHG | OXYGEN SATURATION: 94 %

## 2021-10-28 DIAGNOSIS — M48.061 LUMBAR FORAMINAL STENOSIS: ICD-10-CM

## 2021-10-28 DIAGNOSIS — M99.79 FORAMINAL STENOSIS DUE TO INTERVERTEBRAL DISC DISEASE: Primary | ICD-10-CM

## 2021-10-28 DIAGNOSIS — E78.2 MIXED HYPERLIPIDEMIA: ICD-10-CM

## 2021-10-28 DIAGNOSIS — M51.9 FORAMINAL STENOSIS DUE TO INTERVERTEBRAL DISC DISEASE: Primary | ICD-10-CM

## 2021-10-28 DIAGNOSIS — M54.50 ACUTE LEFT-SIDED LOW BACK PAIN WITHOUT SCIATICA: Primary | ICD-10-CM

## 2021-10-28 DIAGNOSIS — M51.16 LUMBAR DISC DISEASE WITH RADICULOPATHY: ICD-10-CM

## 2021-10-28 PROCEDURE — 1036F TOBACCO NON-USER: CPT | Performed by: FAMILY MEDICINE

## 2021-10-28 PROCEDURE — 97112 NEUROMUSCULAR REEDUCATION: CPT | Performed by: PHYSICAL THERAPIST

## 2021-10-28 PROCEDURE — 97530 THERAPEUTIC ACTIVITIES: CPT | Performed by: PHYSICAL THERAPIST

## 2021-10-28 PROCEDURE — 97110 THERAPEUTIC EXERCISES: CPT | Performed by: PHYSICAL THERAPIST

## 2021-10-28 PROCEDURE — 99213 OFFICE O/P EST LOW 20 MIN: CPT | Performed by: FAMILY MEDICINE

## 2021-10-28 PROCEDURE — 3008F BODY MASS INDEX DOCD: CPT | Performed by: FAMILY MEDICINE

## 2021-11-18 ENCOUNTER — HOSPITAL ENCOUNTER (OUTPATIENT)
Dept: RADIOLOGY | Facility: CLINIC | Age: 61
Discharge: HOME | End: 2021-11-18
Attending: ANESTHESIOLOGY
Payer: COMMERCIAL

## 2021-11-18 VITALS
TEMPERATURE: 97.9 F | HEART RATE: 82 BPM | SYSTOLIC BLOOD PRESSURE: 131 MMHG | RESPIRATION RATE: 18 BRPM | OXYGEN SATURATION: 95 % | DIASTOLIC BLOOD PRESSURE: 74 MMHG

## 2021-11-18 DIAGNOSIS — M54.16 LUMBAR RADICULOPATHY: ICD-10-CM

## 2021-11-18 DIAGNOSIS — M48.061 LUMBAR FORAMINAL STENOSIS: ICD-10-CM

## 2021-11-18 DIAGNOSIS — M51.26 LUMBAR DISC HERNIATION: ICD-10-CM

## 2021-11-18 PROCEDURE — 62323 NJX INTERLAMINAR LMBR/SAC: CPT | Performed by: ANESTHESIOLOGY

## 2021-11-18 RX ORDER — METHYLPREDNISOLONE ACETATE 80 MG/ML
80 INJECTION, SUSPENSION INTRA-ARTICULAR; INTRALESIONAL; INTRAMUSCULAR; PARENTERAL; SOFT TISSUE ONCE
Status: COMPLETED | OUTPATIENT
Start: 2021-11-18 | End: 2021-11-18

## 2021-11-18 RX ADMIN — IOHEXOL 1 ML: 300 INJECTION, SOLUTION INTRAVENOUS at 14:12

## 2021-11-18 RX ADMIN — METHYLPREDNISOLONE ACETATE 80 MG: 80 INJECTION, SUSPENSION INTRA-ARTICULAR; INTRALESIONAL; INTRAMUSCULAR; SOFT TISSUE at 14:12

## 2021-11-26 ENCOUNTER — TELEPHONE (OUTPATIENT)
Dept: FAMILY MEDICINE CLINIC | Facility: CLINIC | Age: 61
End: 2021-11-26

## 2021-11-26 ENCOUNTER — TELEPHONE (OUTPATIENT)
Dept: PAIN MEDICINE | Facility: CLINIC | Age: 61
End: 2021-11-26

## 2021-11-26 DIAGNOSIS — M51.26 LUMBAR DISC HERNIATION: ICD-10-CM

## 2021-11-26 DIAGNOSIS — M99.79 FORAMINAL STENOSIS DUE TO INTERVERTEBRAL DISC DISEASE: ICD-10-CM

## 2021-11-26 DIAGNOSIS — M51.9 FORAMINAL STENOSIS DUE TO INTERVERTEBRAL DISC DISEASE: ICD-10-CM

## 2021-11-26 DIAGNOSIS — M47.816 LUMBAR SPONDYLOSIS: ICD-10-CM

## 2021-11-26 RX ORDER — GABAPENTIN 300 MG/1
300 CAPSULE ORAL 2 TIMES DAILY
Qty: 60 CAPSULE | Refills: 0 | Status: SHIPPED | OUTPATIENT
Start: 2021-11-26 | End: 2021-12-02 | Stop reason: SDUPTHER

## 2021-11-26 NOTE — TELEPHONE ENCOUNTER
Pt reports no improvement post inj   Pain level 3-4/10      S/p LESi to the left 2 11/18 F/u 12/2

## 2021-11-29 DIAGNOSIS — E78.2 MIXED HYPERLIPIDEMIA: ICD-10-CM

## 2021-11-30 RX ORDER — SIMVASTATIN 40 MG
TABLET ORAL
Qty: 90 TABLET | Refills: 0 | Status: SHIPPED | OUTPATIENT
Start: 2021-11-30 | End: 2022-03-02

## 2021-12-02 ENCOUNTER — OFFICE VISIT (OUTPATIENT)
Dept: PAIN MEDICINE | Facility: CLINIC | Age: 61
End: 2021-12-02
Payer: COMMERCIAL

## 2021-12-02 ENCOUNTER — PATIENT MESSAGE (OUTPATIENT)
Dept: PAIN MEDICINE | Facility: CLINIC | Age: 61
End: 2021-12-02

## 2021-12-02 VITALS
DIASTOLIC BLOOD PRESSURE: 74 MMHG | WEIGHT: 211 LBS | HEIGHT: 73 IN | TEMPERATURE: 98.4 F | BODY MASS INDEX: 27.96 KG/M2 | SYSTOLIC BLOOD PRESSURE: 130 MMHG | HEART RATE: 72 BPM

## 2021-12-02 DIAGNOSIS — J45.20 MILD INTERMITTENT ASTHMA WITHOUT COMPLICATION: ICD-10-CM

## 2021-12-02 DIAGNOSIS — M47.816 LUMBAR SPONDYLOSIS: ICD-10-CM

## 2021-12-02 DIAGNOSIS — M54.50 CHRONIC BILATERAL LOW BACK PAIN WITHOUT SCIATICA: Primary | ICD-10-CM

## 2021-12-02 DIAGNOSIS — G89.29 CHRONIC BILATERAL LOW BACK PAIN WITHOUT SCIATICA: Primary | ICD-10-CM

## 2021-12-02 DIAGNOSIS — M51.9 FORAMINAL STENOSIS DUE TO INTERVERTEBRAL DISC DISEASE: ICD-10-CM

## 2021-12-02 DIAGNOSIS — M54.16 LUMBAR RADICULOPATHY: ICD-10-CM

## 2021-12-02 DIAGNOSIS — M99.79 FORAMINAL STENOSIS DUE TO INTERVERTEBRAL DISC DISEASE: ICD-10-CM

## 2021-12-02 DIAGNOSIS — M48.061 LUMBAR FORAMINAL STENOSIS: ICD-10-CM

## 2021-12-02 DIAGNOSIS — M51.26 LUMBAR DISC HERNIATION: ICD-10-CM

## 2021-12-02 DIAGNOSIS — F52.21 ERECTILE DYSFUNCTION OF NON-ORGANIC ORIGIN: ICD-10-CM

## 2021-12-02 PROCEDURE — 99214 OFFICE O/P EST MOD 30 MIN: CPT | Performed by: NURSE PRACTITIONER

## 2021-12-02 PROCEDURE — 3008F BODY MASS INDEX DOCD: CPT | Performed by: NURSE PRACTITIONER

## 2021-12-02 PROCEDURE — 1036F TOBACCO NON-USER: CPT | Performed by: NURSE PRACTITIONER

## 2021-12-02 RX ORDER — OMEGA-3 FATTY ACIDS/FISH OIL 300-1000MG
CAPSULE ORAL
COMMUNITY

## 2021-12-02 RX ORDER — SENNOSIDES 8.6 MG
650 CAPSULE ORAL EVERY 8 HOURS PRN
COMMUNITY

## 2021-12-02 RX ORDER — GABAPENTIN 300 MG/1
CAPSULE ORAL
Qty: 90 CAPSULE | Refills: 0 | Status: SHIPPED | OUTPATIENT
Start: 2021-12-02 | End: 2022-01-20

## 2021-12-03 ENCOUNTER — LAB (OUTPATIENT)
Dept: LAB | Facility: CLINIC | Age: 61
End: 2021-12-03
Payer: COMMERCIAL

## 2021-12-03 DIAGNOSIS — E78.2 MIXED HYPERLIPIDEMIA: ICD-10-CM

## 2021-12-03 LAB
ALBUMIN SERPL BCP-MCNC: 3.5 G/DL (ref 3.5–5)
ALP SERPL-CCNC: 54 U/L (ref 46–116)
ALT SERPL W P-5'-P-CCNC: 39 U/L (ref 12–78)
ANION GAP SERPL CALCULATED.3IONS-SCNC: 4 MMOL/L (ref 4–13)
AST SERPL W P-5'-P-CCNC: 21 U/L (ref 5–45)
BILIRUB SERPL-MCNC: 0.49 MG/DL (ref 0.2–1)
BUN SERPL-MCNC: 19 MG/DL (ref 5–25)
CALCIUM SERPL-MCNC: 9.6 MG/DL (ref 8.3–10.1)
CHLORIDE SERPL-SCNC: 107 MMOL/L (ref 100–108)
CHOLEST SERPL-MCNC: 163 MG/DL
CO2 SERPL-SCNC: 30 MMOL/L (ref 21–32)
CREAT SERPL-MCNC: 0.94 MG/DL (ref 0.6–1.3)
GFR SERPL CREATININE-BSD FRML MDRD: 87 ML/MIN/1.73SQ M
GLUCOSE P FAST SERPL-MCNC: 90 MG/DL (ref 65–99)
HDLC SERPL-MCNC: 60 MG/DL
LDLC SERPL CALC-MCNC: 82 MG/DL (ref 0–100)
NONHDLC SERPL-MCNC: 103 MG/DL
POTASSIUM SERPL-SCNC: 4.3 MMOL/L (ref 3.5–5.3)
PROT SERPL-MCNC: 7.2 G/DL (ref 6.4–8.2)
SODIUM SERPL-SCNC: 141 MMOL/L (ref 136–145)
TRIGL SERPL-MCNC: 104 MG/DL

## 2021-12-03 PROCEDURE — 80061 LIPID PANEL: CPT

## 2021-12-03 PROCEDURE — 80053 COMPREHEN METABOLIC PANEL: CPT

## 2021-12-03 PROCEDURE — 36415 COLL VENOUS BLD VENIPUNCTURE: CPT

## 2021-12-03 RX ORDER — SILDENAFIL 100 MG/1
TABLET, FILM COATED ORAL
Qty: 10 TABLET
Start: 2021-12-03

## 2021-12-03 RX ORDER — ALBUTEROL SULFATE 90 UG/1
2 AEROSOL, METERED RESPIRATORY (INHALATION) EVERY 4 HOURS PRN
Start: 2021-12-03 | End: 2022-03-04 | Stop reason: SDUPTHER

## 2022-02-07 ENCOUNTER — OFFICE VISIT (OUTPATIENT)
Dept: PAIN MEDICINE | Facility: CLINIC | Age: 62
End: 2022-02-07
Payer: COMMERCIAL

## 2022-02-07 VITALS
DIASTOLIC BLOOD PRESSURE: 78 MMHG | WEIGHT: 216 LBS | TEMPERATURE: 97.9 F | HEART RATE: 78 BPM | BODY MASS INDEX: 28.63 KG/M2 | SYSTOLIC BLOOD PRESSURE: 134 MMHG | HEIGHT: 73 IN

## 2022-02-07 DIAGNOSIS — M99.79 FORAMINAL STENOSIS DUE TO INTERVERTEBRAL DISC DISEASE: ICD-10-CM

## 2022-02-07 DIAGNOSIS — M51.26 LUMBAR DISC HERNIATION: ICD-10-CM

## 2022-02-07 DIAGNOSIS — M54.16 LUMBAR RADICULOPATHY: ICD-10-CM

## 2022-02-07 DIAGNOSIS — G89.4 CHRONIC PAIN SYNDROME: Primary | ICD-10-CM

## 2022-02-07 DIAGNOSIS — M51.9 FORAMINAL STENOSIS DUE TO INTERVERTEBRAL DISC DISEASE: ICD-10-CM

## 2022-02-07 DIAGNOSIS — M47.816 LUMBAR SPONDYLOSIS: ICD-10-CM

## 2022-02-07 PROCEDURE — 1036F TOBACCO NON-USER: CPT | Performed by: NURSE PRACTITIONER

## 2022-02-07 PROCEDURE — 99214 OFFICE O/P EST MOD 30 MIN: CPT | Performed by: NURSE PRACTITIONER

## 2022-02-07 PROCEDURE — 3008F BODY MASS INDEX DOCD: CPT | Performed by: NURSE PRACTITIONER

## 2022-02-07 RX ORDER — GABAPENTIN 300 MG/1
CAPSULE ORAL
Qty: 90 CAPSULE | Refills: 3 | Status: SHIPPED | OUTPATIENT
Start: 2022-02-07 | End: 2022-05-31 | Stop reason: SDUPTHER

## 2022-02-07 NOTE — PROGRESS NOTES
Assessment:  1  Chronic pain syndrome    2  Lumbar radiculopathy    3  Lumbar disc herniation    4  Lumbar spondylosis    5  Foraminal stenosis due to intervertebral disc disease        Plan:  While the patient was in the office today, I did have a thorough conversation with the patient regarding their chronic pain syndrome, symptoms, medication regimen, and treatment plan  I discussed with the patient at this point time since he is on a low and subtherapeutic dose of the gabapentin and I do feel would be in his best long-term interest, we could consider further titrating the gabapentin to a more therapeutic dose of 1200 mg a day and see how he does  This way we could also see if he could use less of the ibuprofen and Tylenol and use it more as needed for flare-ups  However, for now, the patient preferred to just continue the gabapentin at the 900 mg a day and see if he could still titrate himself down on the ibuprofen and Tylenol at least by 50% if not using it as needed  However, he understands that if between now and his next office visit he would like to increase the gabapentin he is to call our office and we can changes prescription  The patient will follow-up in 4 months for medication prescription refill and reevaluation  The patient was advised to contact the office should their symptoms worsen in the interim  The patient was agreeable and verbalized an understanding  History of Present Illness: The patient is a 64 y o  male last seen on 12/2/2021 who presents for a follow up office visit in regards to chronic pain syndrome, as the patient's pain has been ongoing for greater than a year,  secondary to lumbar disc herniation with spondylosis and stenosis as well as radiculopathy    The patient currently reports that since his last office visit there has been definite improvement in low back and leg pain radicular symptoms as a result of the increasing the gabapentin as well as the fact that he has been taking 600-800 mg of ibuprofen twice a day and 1300 mg of Tylenol at bedtime  The patient reports that at this point because of the significant improvement he has decided to hold off a surgical consultation and want to discuss his medication regimen to see what our thoughts were on what he is taking and what he should do from this point  The patient presents today for regular medication follow-up visit    Current pain medications includes:  Gabapentin 300 mg t i d  The patient reports that this regimen is providing 90% pain relief  The patient is reporting no side effects from this pain medication regimen  I have personally reviewed and/or updated the patient's past medical history, past surgical history, family history, social history, current medications, allergies, and vital signs today  Review of Systems:    Review of Systems   Respiratory: Negative for shortness of breath  Cardiovascular: Negative for chest pain  Gastrointestinal: Negative for constipation, diarrhea, nausea and vomiting  Musculoskeletal: Negative for arthralgias, gait problem, joint swelling and myalgias  Skin: Negative for rash  Neurological: Negative for dizziness, seizures and weakness  All other systems reviewed and are negative  Past Medical History:   Diagnosis Date    Arthritis     Asthma     Colon polyp     Diverticulosis     DVT (deep venous thrombosis) (HCC)     after knee sx    Family hx colonic polyps     mother    Family hx of colon cancer     maternal Aunt, Uncle    Hyperlipidemia     Peyronie disease        Past Surgical History:   Procedure Laterality Date    COLONOSCOPY      KNEE ARTHROSCOPY      KNEE SURGERY Left     [place] ESOPHAGOGASTRODUODENOSCOPY TRANSORAL DIAGNOSTIC N/A 7/5/2017    Procedure: ESOPHAGOGASTRODUODENOSCOPY (EGD); Surgeon: Quintin Meyers MD;  Location: [facility];   Service: Gastroenterology    SHOULDER SURGERY         Family History   Problem Relation Age of Onset    Other Mother         headaches    Lung cancer Mother     Lymphoma Father         acute    Heart attack Father     Esophageal cancer Father     Colon cancer Family     Other Family         headaches    Skin cancer Family        Social History     Occupational History    Occupation: employed   Tobacco Use    Smoking status: Never Smoker    Smokeless tobacco: Never Used   Substance and Sexual Activity    Alcohol use: Yes     Comment: rarely    Drug use: No    Sexual activity: Not on file         Current Outpatient Medications:     acetaminophen (Tylenol 8 Hour Arthritis Pain) 650 mg CR tablet, Take 650 mg by mouth every 8 (eight) hours as needed for mild pain, Disp: , Rfl:     albuterol (PROVENTIL HFA,VENTOLIN HFA) 90 mcg/act inhaler, Inhale 2 puffs every 4 (four) hours as needed for wheezing or shortness of breath, Disp: , Rfl:     Ibuprofen 200 MG CAPS, Take by mouth, Disp: , Rfl:     loratadine-pseudoephedrine (Wal-itin D 24 Hour)  mg per 24 hr tablet, Take 1 tablet by mouth daily, Disp: 90 tablet, Rfl: 1    Multiple Vitamin (MULTI-VITAMIN DAILY PO), Take by mouth, Disp: , Rfl:     sildenafil (Viagra) 100 mg tablet, Take as directed , Disp: 10 tablet, Rfl:     simvastatin (ZOCOR) 40 mg tablet, TAKE 1 TABLET BY MOUTH EVERY DAY, Disp: 90 tablet, Rfl: 0    gabapentin (NEURONTIN) 300 mg capsule, TAKE 1 CAPSULE BY MOUTH 3 TIMES A DAY, Disp: 90 capsule, Rfl: 3    Allergies   Allergen Reactions    Bee Pollen     Cat Hair Extract     Pollen Extract        Physical Exam:    /78 (BP Location: Left arm, Patient Position: Sitting, Cuff Size: Standard)   Pulse 78   Temp 97 9 °F (36 6 °C)   Ht 6' 1" (1 854 m)   Wt 98 kg (216 lb)   BMI 28 50 kg/m²     Constitutional:normal, well developed, well nourished, alert, in no distress and non-toxic and no overt pain behavior    Eyes:anicteric  HEENT:grossly intact  Neck:supple, symmetric, trachea midline and no masses   Pulmonary:even and unlabored  Cardiovascular:No edema or pitting edema present  Skin:Normal without rashes or lesions and well hydrated  Psychiatric:Mood and affect appropriate  Neurologic:Cranial Nerves II-XII grossly intact  Musculoskeletal:normal      Imaging  No orders to display         No orders of the defined types were placed in this encounter

## 2022-03-04 ENCOUNTER — OFFICE VISIT (OUTPATIENT)
Dept: FAMILY MEDICINE CLINIC | Facility: CLINIC | Age: 62
End: 2022-03-04
Payer: COMMERCIAL

## 2022-03-04 VITALS
WEIGHT: 216.6 LBS | HEART RATE: 72 BPM | SYSTOLIC BLOOD PRESSURE: 110 MMHG | HEIGHT: 73 IN | RESPIRATION RATE: 16 BRPM | DIASTOLIC BLOOD PRESSURE: 68 MMHG | BODY MASS INDEX: 28.71 KG/M2

## 2022-03-04 DIAGNOSIS — Z23 NEED FOR ZOSTER VACCINE: ICD-10-CM

## 2022-03-04 DIAGNOSIS — M54.16 LUMBAR RADICULOPATHY: Primary | ICD-10-CM

## 2022-03-04 DIAGNOSIS — J30.9 ALLERGIC RHINITIS, UNSPECIFIED SEASONALITY, UNSPECIFIED TRIGGER: ICD-10-CM

## 2022-03-04 DIAGNOSIS — J45.20 MILD INTERMITTENT ASTHMA WITHOUT COMPLICATION: ICD-10-CM

## 2022-03-04 DIAGNOSIS — E78.2 MIXED HYPERLIPIDEMIA: ICD-10-CM

## 2022-03-04 PROCEDURE — 99214 OFFICE O/P EST MOD 30 MIN: CPT | Performed by: FAMILY MEDICINE

## 2022-03-04 PROCEDURE — 1036F TOBACCO NON-USER: CPT | Performed by: FAMILY MEDICINE

## 2022-03-04 PROCEDURE — 3725F SCREEN DEPRESSION PERFORMED: CPT | Performed by: FAMILY MEDICINE

## 2022-03-04 PROCEDURE — 3008F BODY MASS INDEX DOCD: CPT | Performed by: FAMILY MEDICINE

## 2022-03-04 RX ORDER — LORATADINE 10 MG
1 TABLET ORAL DAILY
Qty: 90 TABLET | Refills: 1 | Status: SHIPPED | OUTPATIENT
Start: 2022-03-04

## 2022-03-04 RX ORDER — ALBUTEROL SULFATE 90 UG/1
2 AEROSOL, METERED RESPIRATORY (INHALATION) EVERY 4 HOURS PRN
Qty: 18 G | Refills: 0 | Status: SHIPPED | OUTPATIENT
Start: 2022-03-04

## 2022-03-04 NOTE — ASSESSMENT & PLAN NOTE
Following with pain management  He is trying to limit Advi, and will talk to pain management about this

## 2022-03-04 NOTE — PATIENT INSTRUCTIONS
Problem List Items Addressed This Visit     Allergic rhinitis     SOPHIE doing well  Has been on Claritin D> Reviewed about limiting decongestants  Relevant Medications    loratadine-pseudoephedrine (Wal-itin D 24 Hour)  mg per 24 hr tablet    Asthma     Asthma good  Has not needed MDI  Has on hand  Relevant Medications    albuterol (PROVENTIL HFA,VENTOLIN HFA) 90 mcg/act inhaler    Hyperlipidemia     Cholesterol excellent with Zocor  As such, no changes  Check in 6 months  Relevant Orders    Comprehensive metabolic panel    Lipid panel    Lumbar radiculopathy - Primary     Following with pain management  He is trying to limit Advi, and will talk to pain management about this  Other Visit Diagnoses     Need for zoster vaccine        Recommend Shingrix  Patient will check with pharmacy, as well as insurance to find out where it is covered  COVID 19 Instructions    Jewell Poppy was advised to limit contact with others to essential tasks such as getting food, medications, and medical care  Proper handwashing reviewed, and Hand sanitzer when washing is not available  If the patient develops symptoms of COVID 19, the patient should call the office as soon as possible  For 5121-5664 Flu season, it is strongly recommended that Flu Vaccinations be obtained  Virtual Visits:  Lamar: This works on smart phones (any phone with Internet browsing capability)  You should get a text message when the provider is ready to see you  Click on the link in the text message, and the call should start  You will need to type in your name, and allow camera and microphone access  This is HIPPA compliant, and secure  If you have not already done so, get immunized to COVID 19  We are committed to getting you vaccinated as soon as possible and will be closely following CDC and [facility]  guidelines as they are released and revised    Please refer to our COVID-19 vaccine webpage for the most up to date information on the vaccine and our distribution efforts  This site will also have the most up to date recommendations for COVID booster vaccine  [facility]    Call [phone]), option 7    OUR NEW LOCATION:    [address], [address] [place] [address], [address] [place] [address] Street  Fax: [phone]    Lab services and OB/GYN are at this location as well

## 2022-03-04 NOTE — PROGRESS NOTES
Assessment and Plan:    Problem List Items Addressed This Visit     Allergic rhinitis     SOPHIE doing well  Has been on Claritin D> Reviewed about limiting decongestants  Relevant Medications    loratadine-pseudoephedrine (Wal-itin D 24 Hour)  mg per 24 hr tablet    Asthma     Asthma good  Has not needed MDI  Has on hand  Relevant Medications    albuterol (PROVENTIL HFA,VENTOLIN HFA) 90 mcg/act inhaler    Hyperlipidemia     Cholesterol excellent with Zocor  As such, no changes  Check in 6 months  Relevant Orders    Comprehensive metabolic panel    Lipid panel    Lumbar radiculopathy - Primary     Following with pain management  He is trying to limit Advi, and will talk to pain management about this  Other Visit Diagnoses     Need for zoster vaccine        Recommend Shingrix  Patient will check with pharmacy, as well as insurance to find out where it is covered  Diagnoses and all orders for this visit:    Lumbar radiculopathy    Mixed hyperlipidemia  -     Comprehensive metabolic panel; Future  -     Lipid panel; Future    Mild intermittent asthma without complication  -     albuterol (PROVENTIL HFA,VENTOLIN HFA) 90 mcg/act inhaler; Inhale 2 puffs every 4 (four) hours as needed for wheezing or shortness of breath    Allergic rhinitis, unspecified seasonality, unspecified trigger  -     loratadine-pseudoephedrine (Wal-itin D 24 Hour)  mg per 24 hr tablet; Take 1 tablet by mouth daily    Need for zoster vaccine  Comments:  Recommend Shingrix  Patient will check with pharmacy, as well as insurance to find out where it is covered  Subjective:      Patient ID: Riya Torres is a 64 y o  male  CC:    Chief Complaint   Patient presents with    Follow-up     pt here for a follow up and to review lab results  Lizette       HPI:    Patient is here to review labs from outside  There were biometric panel at work    Cholesterol was excellent  He is on simvastatin 40 mg  PSA:  Patient did have a PSA drawn with this blood work, and reviewed verses his prior PSA  Lumbar stenosis:  Patient continues to follow with pain management  Using Gabapentin  Has been OK  Needs Advil BID as well as tylenol at HS  SOPHIE:  Patient is using Claritin D  The decongestant part has made a significant difference in his congestion  He is not having any problems with that either  Asthma:  No issues with regard to asthma  Has not really had any problems as far as breathing is concerned  The following portions of the patient's history were reviewed and updated as appropriate: allergies, current medications, past family history, past medical history, past social history, past surgical history and problem list       Review of Systems      Data to review:   See scanned report of labs  PSA 0 6, last in 2020 was 0 7  Objective:    Vitals:    03/04/22 1029   BP: 110/68   BP Location: Left arm   Patient Position: Sitting   Cuff Size: Large   Pulse: 72   Resp: 16   Weight: 98 2 kg (216 lb 9 6 oz)   Height: 6' 1" (1 854 m)        Physical Exam  Vitals and nursing note reviewed  Constitutional:       Appearance: Normal appearance  Neck:      Vascular: No carotid bruit  Cardiovascular:      Rate and Rhythm: Normal rate and regular rhythm  Pulses: Normal pulses  Carotid pulses are 2+ on the right side and 2+ on the left side  Heart sounds: Normal heart sounds  No murmur heard  No gallop  Pulmonary:      Effort: Pulmonary effort is normal  No respiratory distress  Breath sounds: Normal breath sounds  No stridor  No wheezing, rhonchi or rales  Chest:      Chest wall: No tenderness  Neurological:      Mental Status: He is alert  BMI Counseling: Body mass index is 28 58 kg/m²   The BMI is above normal  Nutrition recommendations include decreasing portion sizes, encouraging healthy choices of fruits and vegetables, decreasing fast food intake, consuming healthier snacks, limiting drinks that contain sugar, moderation in carbohydrate intake, increasing intake of lean protein, reducing intake of saturated and trans fat and reducing intake of cholesterol  Exercise recommendations include exercising 3-5 times per week  No pharmacotherapy was ordered  Rationale for BMI follow-up plan is due to patient being overweight or obese  Depression Screening and Follow-up Plan: Patient was screened for depression during today's encounter  They screened negative with a PHQ-2 score of 0

## 2022-05-31 ENCOUNTER — OFFICE VISIT (OUTPATIENT)
Dept: PAIN MEDICINE | Facility: CLINIC | Age: 62
End: 2022-05-31
Payer: COMMERCIAL

## 2022-05-31 VITALS
WEIGHT: 219 LBS | OXYGEN SATURATION: 96 % | BODY MASS INDEX: 29.03 KG/M2 | SYSTOLIC BLOOD PRESSURE: 120 MMHG | DIASTOLIC BLOOD PRESSURE: 80 MMHG | HEART RATE: 77 BPM | HEIGHT: 73 IN | TEMPERATURE: 98.7 F

## 2022-05-31 DIAGNOSIS — M99.79 FORAMINAL STENOSIS DUE TO INTERVERTEBRAL DISC DISEASE: ICD-10-CM

## 2022-05-31 DIAGNOSIS — M54.16 LUMBAR RADICULOPATHY: ICD-10-CM

## 2022-05-31 DIAGNOSIS — M51.9 FORAMINAL STENOSIS DUE TO INTERVERTEBRAL DISC DISEASE: ICD-10-CM

## 2022-05-31 DIAGNOSIS — M51.26 LUMBAR DISC HERNIATION: ICD-10-CM

## 2022-05-31 DIAGNOSIS — M47.816 LUMBAR SPONDYLOSIS: ICD-10-CM

## 2022-05-31 DIAGNOSIS — G89.4 CHRONIC PAIN SYNDROME: Primary | ICD-10-CM

## 2022-05-31 PROCEDURE — 99214 OFFICE O/P EST MOD 30 MIN: CPT | Performed by: NURSE PRACTITIONER

## 2022-05-31 RX ORDER — GABAPENTIN 300 MG/1
CAPSULE ORAL
Qty: 90 CAPSULE | Refills: 3 | Status: SHIPPED | OUTPATIENT
Start: 2022-05-31

## 2022-05-31 NOTE — PROGRESS NOTES
Assessment:  1  Chronic pain syndrome    2  Lumbar radiculopathy    3  Lumbar disc herniation    4  Lumbar spondylosis    5  Foraminal stenosis due to intervertebral disc disease        Plan:  While the patient was in the office today, I did have a thorough conversation with the patient regarding their chronic pain syndrome, symptoms, medication regimen, and treatment plan  I discussed with the patient with regards to his knee pain, I do feel that it is in his best interest to follow-up with orthopedics to discuss his possible surgical options  The patient was agreeable and verbalized an understanding  I did discuss with the patient that although gabapentin is many times used as an adjunctive medication and depression and anxiety, it can also sometimes cause people to feel depressed or anxious  However, after a conversation with the patient he was not completely convinced that is from the gabapentin and because of the relief in his back pain at this point time he just want to continue the gabapentin and is hoping that he can get his knee pain more manageable he will feel better in general   For now, it is the patient's wishes to continue the gabapentin as prescribed  The patient will follow-up in 4 months for medication prescription refill and reevaluation  The patient was advised to contact the office should their symptoms worsen in the interim  The patient was agreeable and verbalized an understanding  History of Present Illness: The patient is a 64 y o  male last seen on 02/07/2022 who presents for a follow up office visit in regards to chronic pain syndrome, as the patient's pain has been ongoing for greater than a year, secondary to lumbar disc herniation with spondylosis, stenosis, and radiculopathy    The patient currently reports that since his last office visit overall he feels his chronic low back pain and radicular symptoms has definitely remained significantly stable and manageable with his current medication regimen  However, his biggest issue is his his continued knee pain and is going to follow-up with orthopedics as he feels the next step is working on getting his knees replaced  The patient does report that overall although again his back pain has improved he does feel that at times he is a little down or can be depressed because of the just constant and chronic pain but again is thankful for the relief he is currently experiencing but was not sure if what he was feeling his normal or could be a side effect of the gabapentin but again he reports that is not significant or severe and that he just has feelings of depression at times or feeling down because he just can not do everything he wants to do without pain  The patient denied any thoughts of suicidal or homicidal ideation  The patient presents today for regular medication follow-up visit  Current pain medications includes:  Gabapentin 300 mg t i d   The patient reports that this regimen is providing 80% pain relief  The patient is reporting no side effects from this pain medication regimen  I have personally reviewed and/or updated the patient's past medical history, past surgical history, family history, social history, current medications, allergies, and vital signs today  Review of Systems:    Review of Systems      Past Medical History:   Diagnosis Date    Arthritis     Asthma     Colon polyp     Diverticulosis     DVT (deep venous thrombosis) ([facility] [address] )     after knee sx    Family hx colonic polyps     mother    Family hx of colon cancer     maternal Aunt, Uncle    Hyperlipidemia     Peyronie disease        Past Surgical History:   Procedure Laterality Date    COLONOSCOPY      KNEE ARTHROSCOPY      KNEE SURGERY Left     [place] ESOPHAGOGASTRODUODENOSCOPY TRANSORAL DIAGNOSTIC N/A 7/5/2017    Procedure: ESOPHAGOGASTRODUODENOSCOPY (EGD); Surgeon: Hank López MD;  Location: [facility];   Service: Gastroenterology    SHOULDER SURGERY         Family History   Problem Relation Age of Onset    Other Mother         headaches    Lung cancer Mother     Lymphoma Father         acute    Heart attack Father     Esophageal cancer Father     Colon cancer Family     Other Family         headaches    Skin cancer Family        Social History     Occupational History    Occupation: employed   Tobacco Use    Smoking status: Never Smoker    Smokeless tobacco: Never Used   Substance and Sexual Activity    Alcohol use: Yes     Comment: rarely    Drug use: No    Sexual activity: Not on file         Current Outpatient Medications:     acetaminophen (TYLENOL) 650 mg CR tablet, Take 650 mg by mouth every 8 (eight) hours as needed for mild pain, Disp: , Rfl:     albuterol (PROVENTIL HFA,VENTOLIN HFA) 90 mcg/act inhaler, Inhale 2 puffs every 4 (four) hours as needed for wheezing or shortness of breath, Disp: 18 g, Rfl: 0    gabapentin (NEURONTIN) 300 mg capsule, TAKE 1 CAPSULE BY MOUTH 3 TIMES A DAY, Disp: 90 capsule, Rfl: 3    Ibuprofen 200 MG CAPS, Take by mouth, Disp: , Rfl:     loratadine-pseudoephedrine (Wal-itin D 24 Hour)  mg per 24 hr tablet, Take 1 tablet by mouth daily, Disp: 90 tablet, Rfl: 1    Multiple Vitamin (MULTI-VITAMIN DAILY PO), Take by mouth, Disp: , Rfl:     sildenafil (Viagra) 100 mg tablet, Take as directed , Disp: 10 tablet, Rfl:     simvastatin (ZOCOR) 40 mg tablet, TAKE 1 TABLET BY MOUTH EVERY DAY, Disp: 90 tablet, Rfl: 1    Allergies   Allergen Reactions    Bee Pollen     Cat Hair Extract     Pollen Extract        Physical Exam:    /80 (BP Location: Left arm, Patient Position: Sitting, Cuff Size: Adult)   Pulse 77   Temp 98 7 °F (37 1 °C)   Ht 6' 1" (1 854 m)   Wt 99 3 kg (219 lb)   SpO2 96%   BMI 28 89 kg/m²     Constitutional:normal, well developed, well nourished, alert, in no distress and non-toxic and no overt pain behavior    Eyes:anicteric  HEENT:grossly intact  Neck:supple, symmetric, trachea midline and no masses   Pulmonary:even and unlabored  Cardiovascular:No edema or pitting edema present  Skin:Normal without rashes or lesions and well hydrated  Psychiatric:Mood and affect appropriate  Neurologic:Cranial Nerves II-XII grossly intact  Musculoskeletal:The patient's gait is slightly antalgic, but steady without the use of any assistive devices  Imaging  No orders to display         No orders of the defined types were placed in this encounter

## 2022-08-29 DIAGNOSIS — E78.2 MIXED HYPERLIPIDEMIA: ICD-10-CM

## 2022-08-29 NOTE — TELEPHONE ENCOUNTER
Patient is going to run short before his appt  With you on 9/12, he has BW scheduled for 9/2/22   Thanks

## 2022-08-31 RX ORDER — SIMVASTATIN 40 MG
40 TABLET ORAL DAILY
Qty: 90 TABLET | Refills: 1 | Status: SHIPPED | OUTPATIENT
Start: 2022-08-31

## 2022-09-02 LAB
ALBUMIN SERPL-MCNC: 4.3 G/DL (ref 3.6–5.1)
ALBUMIN/GLOB SERPL: 1.8 (CALC) (ref 1–2.5)
ALP SERPL-CCNC: 52 U/L (ref 35–144)
ALT SERPL-CCNC: 24 U/L (ref 9–46)
AST SERPL-CCNC: 20 U/L (ref 10–35)
BILIRUB SERPL-MCNC: 0.5 MG/DL (ref 0.2–1.2)
BUN SERPL-MCNC: 26 MG/DL (ref 7–25)
BUN/CREAT SERPL: 27 (CALC) (ref 6–22)
CALCIUM SERPL-MCNC: 9.6 MG/DL (ref 8.6–10.3)
CHLORIDE SERPL-SCNC: 104 MMOL/L (ref 98–110)
CHOLEST SERPL-MCNC: 169 MG/DL
CHOLEST/HDLC SERPL: 3.2 (CALC)
CO2 SERPL-SCNC: 31 MMOL/L (ref 20–32)
CREAT SERPL-MCNC: 0.98 MG/DL (ref 0.7–1.35)
GFR/BSA.PRED SERPLBLD CYS-BASED-ARV: 87 ML/MIN/1.73M2
GLOBULIN SER CALC-MCNC: 2.4 G/DL (CALC) (ref 1.9–3.7)
GLUCOSE SERPL-MCNC: 98 MG/DL (ref 65–99)
HDLC SERPL-MCNC: 53 MG/DL
LDLC SERPL CALC-MCNC: 95 MG/DL (CALC)
NONHDLC SERPL-MCNC: 116 MG/DL (CALC)
POTASSIUM SERPL-SCNC: 4.7 MMOL/L (ref 3.5–5.3)
PROT SERPL-MCNC: 6.7 G/DL (ref 6.1–8.1)
SODIUM SERPL-SCNC: 140 MMOL/L (ref 135–146)
TRIGL SERPL-MCNC: 117 MG/DL

## 2022-09-12 ENCOUNTER — OFFICE VISIT (OUTPATIENT)
Dept: FAMILY MEDICINE CLINIC | Facility: CLINIC | Age: 62
End: 2022-09-12
Payer: COMMERCIAL

## 2022-09-12 VITALS
BODY MASS INDEX: 28.89 KG/M2 | SYSTOLIC BLOOD PRESSURE: 116 MMHG | WEIGHT: 218 LBS | HEIGHT: 73 IN | OXYGEN SATURATION: 96 % | HEART RATE: 76 BPM | DIASTOLIC BLOOD PRESSURE: 62 MMHG

## 2022-09-12 DIAGNOSIS — C44.519 BASAL CELL CARCINOMA OF SKIN OF TRUNK, EXCEPT SCROTUM: ICD-10-CM

## 2022-09-12 DIAGNOSIS — E78.2 MIXED HYPERLIPIDEMIA: Primary | ICD-10-CM

## 2022-09-12 DIAGNOSIS — M17.0 PRIMARY OSTEOARTHRITIS OF BOTH KNEES: ICD-10-CM

## 2022-09-12 DIAGNOSIS — G89.4 CHRONIC PAIN SYNDROME: ICD-10-CM

## 2022-09-12 DIAGNOSIS — M54.16 LUMBAR RADICULOPATHY: ICD-10-CM

## 2022-09-12 DIAGNOSIS — Z12.5 PROSTATE CANCER SCREENING: ICD-10-CM

## 2022-09-12 DIAGNOSIS — J30.9 ALLERGIC RHINITIS, UNSPECIFIED SEASONALITY, UNSPECIFIED TRIGGER: ICD-10-CM

## 2022-09-12 DIAGNOSIS — M51.9 FORAMINAL STENOSIS DUE TO INTERVERTEBRAL DISC DISEASE: ICD-10-CM

## 2022-09-12 DIAGNOSIS — M99.79 FORAMINAL STENOSIS DUE TO INTERVERTEBRAL DISC DISEASE: ICD-10-CM

## 2022-09-12 DIAGNOSIS — J45.20 MILD INTERMITTENT ASTHMA WITHOUT COMPLICATION: ICD-10-CM

## 2022-09-12 DIAGNOSIS — M51.26 LUMBAR DISC HERNIATION: ICD-10-CM

## 2022-09-12 DIAGNOSIS — M47.816 LUMBAR SPONDYLOSIS: ICD-10-CM

## 2022-09-12 PROBLEM — L84 CORN OF TOE: Status: RESOLVED | Noted: 2017-04-18 | Resolved: 2022-09-12

## 2022-09-12 PROCEDURE — 99214 OFFICE O/P EST MOD 30 MIN: CPT | Performed by: FAMILY MEDICINE

## 2022-09-12 PROCEDURE — 3725F SCREEN DEPRESSION PERFORMED: CPT | Performed by: FAMILY MEDICINE

## 2022-09-12 RX ORDER — LORATADINE 10 MG
1 TABLET ORAL DAILY
Qty: 90 TABLET | Refills: 1 | Status: SHIPPED | OUTPATIENT
Start: 2022-09-12

## 2022-09-12 NOTE — PROGRESS NOTES
Assessment and Plan:    Problem List Items Addressed This Visit     Allergic rhinitis     Incrteased seasonal symptoms  Has been stable  Claritin D helps  Relevant Medications    loratadine-pseudoephedrine (Wal-itin D 24 Hour)  mg per 24 hr tablet    Asthma     No concerns  Breathing doing well  No need for albuterol  Basal cell carcinoma of skin of trunk, except scrotum     Continue with Dermatology  Chronic pain syndrome     Patient does have chronic pain, and follows with pain management  Doing quite well with gabapentin  It makes the pain tolerable  Foraminal stenosis due to intervertebral disc disease     Continue on gabapentin per pain management  Hyperlipidemia - Primary     On Zocor  Lipids slightly different than before, but not unreasoinable  Check in 6 months  Continue Zocor  Relevant Orders    Comprehensive metabolic panel    Lipid panel    Lumbar disc herniation     On Gabapentin  No changes  Lumbar radiculopathy     Stable, follwing with Pain management  Lumbar spondylosis    Primary osteoarthritis     Getting injections and eval with Ortho (Dr Trell Dunne)  Other Visit Diagnoses     Prostate cancer screening        Relevant Orders    PSA, Total Screen                 Diagnoses and all orders for this visit:    Mixed hyperlipidemia  -     Comprehensive metabolic panel; Future  -     Lipid panel; Future  -     Comprehensive metabolic panel  -     Lipid panel    Allergic rhinitis, unspecified seasonality, unspecified trigger  -     loratadine-pseudoephedrine (Wal-itin D 24 Hour)  mg per 24 hr tablet;  Take 1 tablet by mouth daily    Mild intermittent asthma without complication    Lumbar disc herniation    Foraminal stenosis due to intervertebral disc disease    Lumbar spondylosis    Chronic pain syndrome    Lumbar radiculopathy    Primary osteoarthritis of both knees    Prostate cancer screening  -     PSA, Total Screen; Future  -     PSA, Total Screen    Basal cell carcinoma of skin of trunk, except scrotum            Subjective:      Patient ID: Sophia King is a 58 y o  male  CC:    Chief Complaint   Patient presents with    Follow-up     Patient present today for his 6 month and to review lab results  HPI:    HPI    The following portions of the patient's history were reviewed and updated as appropriate: allergies, current medications, past family history, past medical history, past social history, past surgical history and problem list       Review of Systems   Constitutional: Negative  HENT: Negative  Eyes: Negative  Respiratory: Negative  Cardiovascular: Negative  Gastrointestinal: Negative  Endocrine: Negative  Genitourinary: Negative  Musculoskeletal: Negative  Skin: Negative  Allergic/Immunologic: Negative  Neurological: Negative  Hematological: Negative  Psychiatric/Behavioral: Negative  Data to review:       Objective:    Vitals:    09/12/22 0809   BP: 116/62   BP Location: Right arm   Cuff Size: Large   Pulse: 76   SpO2: 96%   Weight: 98 9 kg (218 lb)   Height: 6' 1" (1 854 m)        Physical Exam  Vitals and nursing note reviewed  Constitutional:       Appearance: Normal appearance  Neck:      Vascular: No carotid bruit  Cardiovascular:      Rate and Rhythm: Normal rate and regular rhythm  Pulses: Normal pulses  Carotid pulses are 2+ on the right side and 2+ on the left side  Heart sounds: Normal heart sounds  No murmur heard  No gallop  Pulmonary:      Effort: Pulmonary effort is normal  No respiratory distress  Breath sounds: Normal breath sounds  No stridor  No wheezing, rhonchi or rales  Chest:      Chest wall: No tenderness  Neurological:      Mental Status: He is alert

## 2022-09-12 NOTE — ASSESSMENT & PLAN NOTE
Patient does have chronic pain, and follows with pain management  Doing quite well with gabapentin  It makes the pain tolerable

## 2022-09-12 NOTE — ASSESSMENT & PLAN NOTE
On Zocor  Lipids slightly different than before, but not unreasoinable  Check in 6 months  Continue Zocor

## 2022-09-12 NOTE — PATIENT INSTRUCTIONS
Problem List Items Addressed This Visit       Allergic rhinitis     Incrteased seasonal symptoms  Has been stable  Claritin D helps  Relevant Medications    loratadine-pseudoephedrine (Wal-itin D 24 Hour)  mg per 24 hr tablet    Asthma     No concerns  Breathing doing well  No need for albuterol  Basal cell carcinoma of skin of trunk, except scrotum     Continue with Dermatology  Chronic pain syndrome     Patient does have chronic pain, and follows with pain management  Doing quite well with gabapentin  It makes the pain tolerable  Foraminal stenosis due to intervertebral disc disease     Continue on gabapentin per pain management  Hyperlipidemia - Primary     On Zocor  Lipids slightly different than before, but not unreasoinable  Check in 6 months  Continue Zocor  Relevant Orders    Comprehensive metabolic panel    Lipid panel    Lumbar disc herniation     On Gabapentin  No changes  Lumbar radiculopathy     Stable, follwing with Pain management  Lumbar spondylosis    Primary osteoarthritis     Getting injections and eval with Ortho (Dr Gema Toro)  Other Visit Diagnoses       Prostate cancer screening        Relevant Orders    PSA, Total Screen            COVID 19 Instructions    Maycol Hyde was advised to limit contact with others to essential tasks such as getting food, medications, and medical care  Proper handwashing reviewed, and Hand sanitzer when washing is not available  If the patient develops symptoms of COVID 19, the patient should call the office as soon as possible  For 8641-4011 Flu season, it is strongly recommended that Flu Vaccinations be obtained  Virtual Visits:  Lamar: This works on smart phones (any phone with Internet browsing capability)  You should get a text message when the provider is ready to see you    Click on the link in the text message, and the call should start  You will need to type in your name, and allow camera and microphone access  This is HIPPA compliant, and secure  If you have not already done so, get immunized to COVID 19  We are committed to getting you vaccinated as soon as possible and will be closely following CDC and [facility]  guidelines as they are released and revised  Please refer to our COVID-19 vaccine webpage for the most up to date information on the vaccine and our distribution efforts  This site will also have the most up to date recommendations for COVID booster vaccine  Delfino lundberg    Call [phone] ([phone]), option 7    OUR NEW LOCATION:    [address], [address] [place], [place] [address] , [place], [address]  Fax: [phone]    Lab services and OB/GYN are at this location as well

## 2022-09-19 ENCOUNTER — OFFICE VISIT (OUTPATIENT)
Dept: PAIN MEDICINE | Facility: CLINIC | Age: 62
End: 2022-09-19
Payer: COMMERCIAL

## 2022-09-19 VITALS
HEIGHT: 73 IN | BODY MASS INDEX: 28.1 KG/M2 | SYSTOLIC BLOOD PRESSURE: 124 MMHG | TEMPERATURE: 98.2 F | WEIGHT: 212 LBS | HEART RATE: 71 BPM | DIASTOLIC BLOOD PRESSURE: 82 MMHG

## 2022-09-19 DIAGNOSIS — M47.816 LUMBAR SPONDYLOSIS: ICD-10-CM

## 2022-09-19 DIAGNOSIS — M99.79 FORAMINAL STENOSIS DUE TO INTERVERTEBRAL DISC DISEASE: ICD-10-CM

## 2022-09-19 DIAGNOSIS — G89.4 CHRONIC PAIN SYNDROME: Primary | ICD-10-CM

## 2022-09-19 DIAGNOSIS — M51.9 FORAMINAL STENOSIS DUE TO INTERVERTEBRAL DISC DISEASE: ICD-10-CM

## 2022-09-19 DIAGNOSIS — M51.26 LUMBAR DISC HERNIATION: ICD-10-CM

## 2022-09-19 DIAGNOSIS — M54.16 LUMBAR RADICULOPATHY: ICD-10-CM

## 2022-09-19 PROCEDURE — 99214 OFFICE O/P EST MOD 30 MIN: CPT | Performed by: NURSE PRACTITIONER

## 2022-09-19 RX ORDER — GABAPENTIN 300 MG/1
CAPSULE ORAL
Qty: 90 CAPSULE | Refills: 5 | Status: SHIPPED | OUTPATIENT
Start: 2022-09-19

## 2022-09-19 NOTE — PROGRESS NOTES
Assessment:  1  Chronic pain syndrome    2  Lumbar radiculopathy    3  Lumbar disc herniation    4  Lumbar spondylosis    5  Foraminal stenosis due to intervertebral disc disease        Plan:  While the patient was in the office today, I did have a thorough conversation with the patient regarding their chronic pain syndrome, symptoms, medication regimen, and treatment plan  I encouraged the patient continue to follow-up with orthopedics as scheduled for his chronic knee pain  The patient was agreeable and verbalized an understanding  With regards to the gabapentin, I did discuss with the patient that since it does seem to make him sleep ear and although he is also convinced that his overall lack of satisfaction with life and overall quality of life is most likely related to his chronic knee pain and the limitations that it puts on his quality of life, we did discuss the possibility of decreasing his gabapentin not 800 mg a day to see if that would help with the sleepiness as well as maybe see if it is causing some of the increased depression  However, at this point the patient preferred to just stay on the 900 mg a day of gabapentin and see how he does but does understand that if before his next office visit he would like to try the decrease, he should call our office and we will proceed from there as appropriate  The patient was agreeable and verbalized an understanding  The patient will follow-up in 6 months for medication prescription refill and reevaluation  The patient was advised to contact the office should their symptoms worsen in the interim  The patient was agreeable and verbalized an understanding  History of Present Illness:     The patient is a 58 y o  male last seen on 5/31/2022 who presents for a follow up office visit in regards to chronic pain syndrome, as the patient's pain has been ongoing for greater than a year, secondary to lumbar disc herniation with spondylosis, stenosis, and radiculopathy  The patient currently reports that in general with regards to his chronic low back pain and radiculopathy, those pain symptoms have remained significantly stable and managed with his current medication regimen and that his biggest pain issue currently is his bilateral knee pain as he continues follow-up Orthopedics for injections and treatment plan options  The patient reports that he still feels the gabapentin makes him somewhat lethargic and is unsure if it is the gabapentin or just the overall intrusion on his overall activities of daily living and quality of life that is making him a little bit more depressed or withdrawn, but he definitely feels the gabapentin has significantly helped his pain  He presents today for regular medication follow-up visit  Current pain medications includes:  Gabapentin 300 mg t i d  The patient reports that this regimen is providing 95% pain relief  The patient is reporting sleepiness from this pain medication regimen  I have personally reviewed and/or updated the patient's past medical history, past surgical history, family history, social history, current medications, allergies, and vital signs today  Review of Systems:    Review of Systems   Respiratory: Negative for shortness of breath  Cardiovascular: Negative for chest pain  Gastrointestinal: Negative for constipation, diarrhea, nausea and vomiting  Musculoskeletal: Negative for arthralgias, gait problem, joint swelling (joint stiffness) and myalgias  Skin: Negative for rash  Neurological: Negative for dizziness, seizures and weakness  All other systems reviewed and are negative          Past Medical History:   Diagnosis Date    Arthritis     Asthma     Colon polyp     Diverticulosis     DVT (deep venous thrombosis) (HCC)     after knee sx    Family hx colonic polyps     mother    Family hx of colon cancer     maternal Aunt, Uncle    Hyperlipidemia     Peyronie disease        Past Surgical History:   Procedure Laterality Date    COLONOSCOPY      KNEE ARTHROSCOPY      KNEE SURGERY Left     [place] ESOPHAGOGASTRODUODENOSCOPY TRANSORAL DIAGNOSTIC N/A 7/5/2017    Procedure: ESOPHAGOGASTRODUODENOSCOPY (EGD); Surgeon: Junior Hernandez MD;  Location: [facility];   Service: Gastroenterology    SHOULDER SURGERY         Family History   Problem Relation Age of Onset    Other Mother         headaches    Lung cancer Mother     Lymphoma Father         acute    Heart attack Father     Esophageal cancer Father     Colon cancer Family     Other Family         headaches    Skin cancer Family        Social History     Occupational History    Occupation: employed   Tobacco Use    Smoking status: Never Smoker    Smokeless tobacco: Never Used   Substance and Sexual Activity    Alcohol use: Yes     Comment: rarely    Drug use: No    Sexual activity: Not on file         Current Outpatient Medications:     acetaminophen (TYLENOL) 650 mg CR tablet, Take 650 mg by mouth every 8 (eight) hours as needed for mild pain, Disp: , Rfl:     albuterol (PROVENTIL HFA,VENTOLIN HFA) 90 mcg/act inhaler, Inhale 2 puffs every 4 (four) hours as needed for wheezing or shortness of breath, Disp: 18 g, Rfl: 0    gabapentin (NEURONTIN) 300 mg capsule, TAKE 1 CAPSULE BY MOUTH 3 TIMES A DAY, Disp: 90 capsule, Rfl: 5    Ibuprofen 200 MG CAPS, Take by mouth, Disp: , Rfl:     loratadine-pseudoephedrine (Wal-itin D 24 Hour)  mg per 24 hr tablet, Take 1 tablet by mouth daily, Disp: 90 tablet, Rfl: 1    Multiple Vitamin (MULTI-VITAMIN DAILY PO), Take by mouth, Disp: , Rfl:     simvastatin (ZOCOR) 40 mg tablet, Take 1 tablet (40 mg total) by mouth daily, Disp: 90 tablet, Rfl: 1    sildenafil (Viagra) 100 mg tablet, Take as directed , Disp: 10 tablet, Rfl:     Allergies   Allergen Reactions    Bee Pollen     Cat Hair Extract     Pollen Extract        Physical Exam:    /82 (BP Location: Left arm, Patient Position: Sitting, Cuff Size: Standard)   Pulse 71   Temp 98 2 °F (36 8 °C)   Ht 6' 1" (1 854 m)   Wt 96 2 kg (212 lb)   BMI 27 97 kg/m²     Constitutional:normal, well developed, well nourished, alert, in no distress and non-toxic and no overt pain behavior  Eyes:anicteric  HEENT:grossly intact  Neck:supple, symmetric, trachea midline and no masses   Pulmonary:even and unlabored  Cardiovascular:No edema or pitting edema present  Skin:Normal without rashes or lesions and well hydrated  Psychiatric:Mood and affect appropriate  Neurologic:Cranial Nerves II-XII grossly intact  Musculoskeletal:normal      Imaging  No orders to display         No orders of the defined types were placed in this encounter

## 2023-02-28 DIAGNOSIS — E78.2 MIXED HYPERLIPIDEMIA: ICD-10-CM

## 2023-02-28 RX ORDER — SIMVASTATIN 40 MG
40 TABLET ORAL DAILY
Qty: 90 TABLET | Refills: 1 | Status: SHIPPED | OUTPATIENT
Start: 2023-02-28

## 2023-03-01 LAB — HBA1C MFR BLD HPLC: 5.5 %

## 2023-03-22 ENCOUNTER — OFFICE VISIT (OUTPATIENT)
Dept: FAMILY MEDICINE CLINIC | Facility: CLINIC | Age: 63
End: 2023-03-22

## 2023-03-22 VITALS
DIASTOLIC BLOOD PRESSURE: 80 MMHG | HEART RATE: 71 BPM | SYSTOLIC BLOOD PRESSURE: 136 MMHG | HEIGHT: 73 IN | TEMPERATURE: 96.6 F | BODY MASS INDEX: 28.41 KG/M2 | WEIGHT: 214.4 LBS | OXYGEN SATURATION: 98 %

## 2023-03-22 DIAGNOSIS — H60.391 OTHER INFECTIVE ACUTE OTITIS EXTERNA OF RIGHT EAR: ICD-10-CM

## 2023-03-22 DIAGNOSIS — Z12.5 PROSTATE CANCER SCREENING: ICD-10-CM

## 2023-03-22 DIAGNOSIS — G43.909 MIGRAINE WITHOUT STATUS MIGRAINOSUS, NOT INTRACTABLE, UNSPECIFIED MIGRAINE TYPE: ICD-10-CM

## 2023-03-22 DIAGNOSIS — E78.2 MIXED HYPERLIPIDEMIA: Primary | ICD-10-CM

## 2023-03-22 DIAGNOSIS — J45.20 MILD INTERMITTENT ASTHMA WITHOUT COMPLICATION: ICD-10-CM

## 2023-03-22 RX ORDER — PENICILLIN V POTASSIUM 500 MG/1
TABLET ORAL
COMMUNITY
Start: 2022-12-21 | End: 2023-03-22

## 2023-03-22 RX ORDER — CIPROFLOXACIN AND DEXAMETHASONE 3; 1 MG/ML; MG/ML
4 SUSPENSION/ DROPS AURICULAR (OTIC) 2 TIMES DAILY
Qty: 7.5 ML | Refills: 0 | Status: SHIPPED | OUTPATIENT
Start: 2023-03-22 | End: 2023-03-29

## 2023-03-22 NOTE — ASSESSMENT & PLAN NOTE
Patient did have headaches previously, but after cervical spine treatment, the headaches resolved completely  He has not needed any treatment

## 2023-03-22 NOTE — PATIENT INSTRUCTIONS
1  Mixed hyperlipidemia  Assessment & Plan:  Patient's cholesterol is doing quite well  Continue simvastatin 40  Check in 6 months  Orders:  -     Comprehensive metabolic panel; Future; Expected date: 09/22/2023  -     Lipid panel; Future; Expected date: 09/22/2023    2  Mild intermittent asthma without complication  Assessment & Plan:  Does have albuterol inhaler available  Has not needed to use it  Reviewed about recommending yearly flu vaccine, and every 10 years getting pneumonia vaccine  After age 72, he would get Prevnar 21, 1 time  3  Migraine without status migrainosus, not intractable, unspecified migraine type  Assessment & Plan:  Patient did have headaches previously, but after cervical spine treatment, the headaches resolved completely  He has not needed any treatment  4  Prostate cancer screening  Comments:  PSA went down from prior  RAFFAELE normal   Check 1 year  Orders:  -     PSA, Total Screen; Future; Expected date: 03/22/2024    5  Other infective acute otitis externa of right ear  Comments:  Irritation in the ear canal   Recommend Ciprodex  Does not appear to be the eardrum itself  Follow in 2 to 4 weeks if needed  Orders:  -     ciprofloxacin-dexamethasone (CIPRODEX) otic suspension; Administer 4 drops to the right ear 2 (two) times a day for 7 days        COVID 19 Instructions    Jewell Mcwilliams was advised to limit contact with others to essential tasks such as getting food, medications, and medical care  Proper handwashing reviewed, and Hand sanitzer when washing is not available  If the patient develops symptoms of COVID 19, the patient should call the office as soon as possible  For 3089-6440 Flu season, it is strongly recommended that Flu Vaccinations be obtained  Virtual Visits:  Lamar: This works on smart phones (any phone with Internet browsing capability)  You should get a text message when the provider is ready to see you    Click on the link in the text message, and the call should start  You will need to type in your name, and allow camera and microphone access  This is HIPPA compliant, and secure  If you have not already done so, get immunized to COVID 19  We are committed to getting you vaccinated as soon as possible and will be closely following CDC and [facility]  guidelines as they are released and revised  Please refer to our COVID-19 vaccine webpage for the most up to date information on the vaccine and our distribution efforts  This site will also have the most up to date recommendations for COVID booster vaccine  [employer] t[employer]    Call [phone]), option 7    OUR NEW LOCATION:    [address], [address] [place], [place] [address] [place], [place], [address]  Fax: [phone]    Lab services and OB/GYN are at this location as well

## 2023-03-22 NOTE — ASSESSMENT & PLAN NOTE
Does have albuterol inhaler available  Has not needed to use it  Reviewed about recommending yearly flu vaccine, and every 10 years getting pneumonia vaccine  After age 72, he would get Prevnar 21, 1 time

## 2023-03-22 NOTE — PROGRESS NOTES
Name: Landry Godwin      : 1960      MRN: [id number]  Encounter Provider: Genesis Caicedo MD  Encounter Date: 3/22/2023   Encounter department: [facility]    Assessment & Plan     1  Mixed hyperlipidemia  Assessment & Plan:  Patient's cholesterol is doing quite well  Continue simvastatin 40  Check in 6 months  Orders:  -     Comprehensive metabolic panel; Future; Expected date: 2023  -     Lipid panel; Future; Expected date: 2023    2  Mild intermittent asthma without complication  Assessment & Plan:  Does have albuterol inhaler available  Has not needed to use it  Reviewed about recommending yearly flu vaccine, and every 10 years getting pneumonia vaccine  After age 72, he would get Prevnar 21, 1 time  3  Migraine without status migrainosus, not intractable, unspecified migraine type  Assessment & Plan:  Patient did have headaches previously, but after cervical spine treatment, the headaches resolved completely  He has not needed any treatment  4  Prostate cancer screening  Comments:  PSA went down from prior  RAFFAELE normal   Check 1 year  Orders:  -     PSA, Total Screen; Future; Expected date: 2024        Depression Screening and Follow-up Plan: Patient was screened for depression during today's encounter  They screened negative with a PHQ-2 score of 0  Subjective      Chief Complaint   Patient presents with   • Follow-up     Pt in office to review Lab   Patient is here to follow-up on several issues  Hyperlipidemia: Currently on simvastatin  Reviewed labs  Hyperglycemia: Blood sugar was slightly elevated  This was on blood work again  No specific issues with it, just that it was elevated  Of note, the A1c was also reviewed, that was normal     Prostate cancer screening: PSA reviewed  Patient denies any current issues with urinary symptoms  That his right ear has been bothering him a little bit lately    Some itching and crusting  Has been several weeks  Migraine headaches: Patient did have migraines previously  After treatment for cervical spine issues, the headaches stopped  He is really not had any since then  Lumbar disc disease: Patient does follow with pain management  Has been having good success  Review of Systems   Constitutional: Negative  HENT: Positive for ear discharge  Eyes: Negative  Respiratory: Negative  Cardiovascular: Negative  Gastrointestinal: Negative  Endocrine: Negative  Genitourinary: Negative  Musculoskeletal: Negative  Skin: Negative  Allergic/Immunologic: Negative  Neurological: Negative  Hematological: Negative  Psychiatric/Behavioral: Negative  Current Outpatient Medications on File Prior to Visit   Medication Sig   • acetaminophen (TYLENOL) 650 mg CR tablet Take 650 mg by mouth every 8 (eight) hours as needed for mild pain   • albuterol (PROVENTIL HFA,VENTOLIN HFA) 90 mcg/act inhaler Inhale 2 puffs every 4 (four) hours as needed for wheezing or shortness of breath   • gabapentin (NEURONTIN) 300 mg capsule TAKE 1 CAPSULE BY MOUTH 3 TIMES A DAY   • Ibuprofen 200 MG CAPS Take by mouth   • loratadine-pseudoephedrine (Wal-itin D 24 Hour)  mg per 24 hr tablet Take 1 tablet by mouth daily   • Multiple Vitamin (MULTI-VITAMIN DAILY PO) Take by mouth   • sildenafil (Viagra) 100 mg tablet Take as directed  • simvastatin (ZOCOR) 40 mg tablet Take 1 tablet (40 mg total) by mouth daily   • [DISCONTINUED] penicillin V potassium (VEETID) 500 mg tablet TAKE 2 TABLETS BY MOUTH NOW, THEN 1 TAB 4 TIMES DAILY X10 DAYS (Patient not taking: Reported on 3/22/2023)       Objective     Laboratory studies from [facility] were faxed to the office via [employer]  Total cholesterol 166, LDL 97, HDL 51, triglycerides 90  Blood sugar 105  Creatinine 1 0, GFR 85  Sodium 143, potassium 4 2, calcium 9 6  AST 25, ALT 29  GGT 21  A1c 5 5    T4: 6 0, normal   Nicotine and cotinine negative  White count 5 9, hemoglobin 15 1, hematocrit 44 9, platelets 092  PSA 0 57  Last was 0 7, October 2020  Iron 78  /80 (BP Location: Left arm, Patient Position: Sitting, Cuff Size: Large)   Pulse 71   Temp (!) 96 6 °F (35 9 °C) (Tympanic)   Ht 6' 1" (1 854 m)   Wt 97 3 kg (214 lb 6 4 oz)   SpO2 98%   BMI 28 29 kg/m²     Physical Exam  Vitals and nursing note reviewed  Constitutional:       Appearance: He is well-developed  HENT:      Head: Normocephalic and atraumatic  Cardiovascular:      Rate and Rhythm: Normal rate and regular rhythm  Pulses:           Carotid pulses are 2+ on the right side and 2+ on the left side  Heart sounds: Normal heart sounds  No murmur heard  No friction rub  No gallop  Pulmonary:      Effort: Pulmonary effort is normal  No respiratory distress  Breath sounds: Normal breath sounds  No wheezing or rales  Genitourinary:     Prostate: Normal       Rectum: Normal  Guaiac result negative  No mass, tenderness, anal fissure, external hemorrhoid or internal hemorrhoid  Normal anal tone  Musculoskeletal:      Cervical back: Normal range of motion and neck supple         Jax Jay MD

## 2023-03-29 ENCOUNTER — OFFICE VISIT (OUTPATIENT)
Dept: PAIN MEDICINE | Facility: CLINIC | Age: 63
End: 2023-03-29

## 2023-03-29 VITALS
TEMPERATURE: 98.2 F | DIASTOLIC BLOOD PRESSURE: 98 MMHG | SYSTOLIC BLOOD PRESSURE: 140 MMHG | WEIGHT: 214 LBS | BODY MASS INDEX: 28.36 KG/M2 | HEIGHT: 73 IN

## 2023-03-29 DIAGNOSIS — M47.816 LUMBAR SPONDYLOSIS: ICD-10-CM

## 2023-03-29 DIAGNOSIS — M51.9 FORAMINAL STENOSIS DUE TO INTERVERTEBRAL DISC DISEASE: ICD-10-CM

## 2023-03-29 DIAGNOSIS — M54.16 LUMBAR RADICULOPATHY: ICD-10-CM

## 2023-03-29 DIAGNOSIS — M99.79 FORAMINAL STENOSIS DUE TO INTERVERTEBRAL DISC DISEASE: ICD-10-CM

## 2023-03-29 DIAGNOSIS — G89.4 CHRONIC PAIN SYNDROME: Primary | ICD-10-CM

## 2023-03-29 DIAGNOSIS — M51.26 LUMBAR DISC HERNIATION: ICD-10-CM

## 2023-03-29 RX ORDER — GABAPENTIN 300 MG/1
CAPSULE ORAL
Qty: 270 CAPSULE | Refills: 1 | Status: SHIPPED | OUTPATIENT
Start: 2023-03-29

## 2023-03-29 NOTE — PROGRESS NOTES
Assessment:  1  Chronic pain syndrome    2  Lumbar radiculopathy    3  Lumbar disc herniation    4  Lumbar spondylosis    5  Foraminal stenosis due to intervertebral disc disease        Plan:  While the patient was in the office today, I did have a thorough conversation with the patient regarding their chronic pain syndrome, symptoms, and medication regimen/treatment plan  With regards to his knee pain, I encouraged the patient continue to follow-up with orthopedics as scheduled  The patient was agreeable and verbalized an understanding  I discussed with the patient that with regards to the gabapentin, since he is noting moderate and stable overall relief of his chronic low back and leg pain symptoms, without any significant side effects or issues and he is on a lower and subtherapeutic dose, I feel it is reasonable and appropriate to continue with the gabapentin as prescribed  The patient was agreeable and verbalized an understanding  The patient will follow-up in 6 months for medication prescription refill and reevaluation  The patient was advised to contact the office should their symptoms worsen in the interim  The patient was agreeable and verbalized an understanding  History of Present Illness: The patient is a 58 y o  male last seen on 09/19/2022 who presents for a follow up office visit in regards to chronic pain syndrome, as the patient's pain has been ongoing for greater than a year, secondary to lumbar disc herniation with spondylosis, stenosis, and radiculopathy  The patient currently reports that since his last office visit overall his pain symptoms have remained significantly stable and manageable with regards to his low back pain and radicular symptoms and that at this point his biggest issue is his left greater than right knee pain and he has been continue to follow-up with orthopedics regarding injections and possible surgical options in the future    The patient reports that he feels his biggest limitation of activity is related to the knee pain as the medication regimen he is currently on keeps his back pain and radicular symptoms significantly managed and tolerable  The patient presents today for regular medication follow-up visit  Current pain medications includes: Gabapentin 300 mg 3 times daily  The patient reports that this regimen is providing 50% pain relief  The patient is reporting no side effects from this pain medication regimen  I have personally reviewed and/or updated the patient's past medical history, past surgical history, family history, social history, current medications, allergies, and vital signs today  Review of Systems:    Review of Systems   Musculoskeletal:        Decreased ROM         Past Medical History:   Diagnosis Date   • Arthritis    • Asthma    • Colon polyp    • Diverticulosis    • DVT (deep venous thrombosis) (HCC)     after knee sx   • Family hx colonic polyps     mother   • Family hx of colon cancer     maternal Aunt, Uncle   • Hyperlipidemia    • Peyronie disease        Past Surgical History:   Procedure Laterality Date   • COLONOSCOPY     • KNEE ARTHROSCOPY     • KNEE SURGERY Left    • [place] ESOPHAGOGASTRODUODENOSCOPY TRANSORAL DIAGNOSTIC N/A 7/5/2017    Procedure: ESOPHAGOGASTRODUODENOSCOPY (EGD); Surgeon: Chayo Little MD;  Location: [facility];   Service: Gastroenterology   • SHOULDER SURGERY         Family History   Problem Relation Age of Onset   • Other Mother         headaches   • Lung cancer Mother    • Lymphoma Father         acute   • Heart attack Father    • Esophageal cancer Father    • Colon cancer Family    • Other Family         headaches   • Skin cancer Family        Social History     Occupational History   • Occupation: employed   Tobacco Use   • Smoking status: Never   • Smokeless tobacco: Never   Substance and Sexual Activity   • Alcohol use: Yes     Comment: rarely   • Drug use: No   • Sexual activity: Not on "file         Current Outpatient Medications:   •  acetaminophen (TYLENOL) 650 mg CR tablet, Take 650 mg by mouth every 8 (eight) hours as needed for mild pain, Disp: , Rfl:   •  albuterol (PROVENTIL HFA,VENTOLIN HFA) 90 mcg/act inhaler, Inhale 2 puffs every 4 (four) hours as needed for wheezing or shortness of breath, Disp: 18 g, Rfl: 0  •  ciprofloxacin-dexamethasone (CIPRODEX) otic suspension, Administer 4 drops to the right ear 2 (two) times a day for 7 days, Disp: 7 5 mL, Rfl: 0  •  gabapentin (NEURONTIN) 300 mg capsule, TAKE 1 CAPSULE BY MOUTH 3 TIMES A DAY, Disp: 270 capsule, Rfl: 1  •  Ibuprofen 200 MG CAPS, Take by mouth, Disp: , Rfl:   •  loratadine-pseudoephedrine (Wal-itin D 24 Hour)  mg per 24 hr tablet, Take 1 tablet by mouth daily, Disp: 90 tablet, Rfl: 1  •  Multiple Vitamin (MULTI-VITAMIN DAILY PO), Take by mouth, Disp: , Rfl:   •  sildenafil (Viagra) 100 mg tablet, Take as directed , Disp: 10 tablet, Rfl:   •  simvastatin (ZOCOR) 40 mg tablet, Take 1 tablet (40 mg total) by mouth daily, Disp: 90 tablet, Rfl: 1    Allergies   Allergen Reactions   • Bee Pollen    • Cat Hair Extract    • Pollen Extract        Physical Exam:    /98 (BP Location: Left arm, Patient Position: Sitting, Cuff Size: Adult)   Temp 98 2 °F (36 8 °C)   Ht 6' 1\" (1 854 m) Comment: verbal  Wt 97 1 kg (214 lb)   BMI 28 23 kg/m²     Constitutional:normal, well developed, well nourished, alert, in no distress and non-toxic and no overt pain behavior  Eyes:anicteric  HEENT:grossly intact  Neck:supple, symmetric, trachea midline and no masses   Pulmonary:even and unlabored  Cardiovascular:No edema or pitting edema present  Skin:Normal without rashes or lesions and well hydrated  Psychiatric:Mood and affect appropriate  Neurologic:Cranial Nerves II-XII grossly intact  Musculoskeletal:The patient's gait is slightly antalgic, but steady without the use of any assistive devices        Imaging  No orders to display         No " orders of the defined types were placed in this encounter

## 2023-05-02 ENCOUNTER — PATIENT MESSAGE (OUTPATIENT)
Dept: FAMILY MEDICINE CLINIC | Facility: CLINIC | Age: 63
End: 2023-05-02

## 2023-05-02 DIAGNOSIS — J30.9 ALLERGIC RHINITIS, UNSPECIFIED SEASONALITY, UNSPECIFIED TRIGGER: ICD-10-CM

## 2023-05-02 RX ORDER — LORATADINE 10 MG
1 TABLET ORAL DAILY
Qty: 90 TABLET | Refills: 1 | Status: SHIPPED | OUTPATIENT
Start: 2023-05-02

## 2023-08-24 DIAGNOSIS — E78.2 MIXED HYPERLIPIDEMIA: ICD-10-CM

## 2023-08-24 RX ORDER — SIMVASTATIN 40 MG
40 TABLET ORAL DAILY
Qty: 90 TABLET | Refills: 0 | Status: SHIPPED | OUTPATIENT
Start: 2023-08-24

## 2023-09-11 ENCOUNTER — OFFICE VISIT (OUTPATIENT)
Dept: PAIN MEDICINE | Facility: CLINIC | Age: 63
End: 2023-09-11
Payer: COMMERCIAL

## 2023-09-11 VITALS
HEIGHT: 73 IN | DIASTOLIC BLOOD PRESSURE: 70 MMHG | OXYGEN SATURATION: 95 % | SYSTOLIC BLOOD PRESSURE: 120 MMHG | WEIGHT: 215 LBS | BODY MASS INDEX: 28.49 KG/M2

## 2023-09-11 DIAGNOSIS — M47.816 LUMBAR SPONDYLOSIS: ICD-10-CM

## 2023-09-11 DIAGNOSIS — M51.9 FORAMINAL STENOSIS DUE TO INTERVERTEBRAL DISC DISEASE: ICD-10-CM

## 2023-09-11 DIAGNOSIS — M54.16 LUMBAR RADICULOPATHY: ICD-10-CM

## 2023-09-11 DIAGNOSIS — G89.29 CHRONIC BILATERAL LOW BACK PAIN, UNSPECIFIED WHETHER SCIATICA PRESENT: ICD-10-CM

## 2023-09-11 DIAGNOSIS — G89.4 CHRONIC PAIN SYNDROME: Primary | ICD-10-CM

## 2023-09-11 DIAGNOSIS — M54.50 CHRONIC BILATERAL LOW BACK PAIN, UNSPECIFIED WHETHER SCIATICA PRESENT: ICD-10-CM

## 2023-09-11 DIAGNOSIS — M99.79 FORAMINAL STENOSIS DUE TO INTERVERTEBRAL DISC DISEASE: ICD-10-CM

## 2023-09-11 DIAGNOSIS — M51.26 LUMBAR DISC HERNIATION: ICD-10-CM

## 2023-09-11 DIAGNOSIS — M48.061 LUMBAR FORAMINAL STENOSIS: ICD-10-CM

## 2023-09-11 PROCEDURE — 99214 OFFICE O/P EST MOD 30 MIN: CPT | Performed by: NURSE PRACTITIONER

## 2023-09-11 RX ORDER — GABAPENTIN 300 MG/1
CAPSULE ORAL
Qty: 270 CAPSULE | Refills: 1 | Status: SHIPPED | OUTPATIENT
Start: 2023-09-11

## 2023-09-11 NOTE — PROGRESS NOTES
Assessment:  1. Chronic pain syndrome    2. Chronic bilateral low back pain, unspecified whether sciatica present    3. Lumbar spondylosis    4. Lumbar disc herniation    5. Lumbar foraminal stenosis    6. Lumbar radiculopathy        Plan:  The patient is a 61 y.o. male last seen on 3/29/23 who presents for a follow up office visit in regards to chronic pain secondary to low back pain, lumbar herniation, lumbar foraminal stenosis, lumbar radiculopathy and lumbar spondylosis. The patient presents today with ongoing low back pain. He also has bilateral knee pain times a day which is providing moderate pain relief along with ibuprofen and Tylenol. Therefore, I will continue him on the gabapentin as prescribed, and refills were sent to the pharmacy. In regards to the new paresthesias in his feet, I offered to order an EMG of bilateral lower extremities to evaluate for neuropathy. However patient would like to hold off until talking to his primary care physician. MRI of the lumbar spine showed no significant central or right foraminal stenosis, mild left foraminal stenosis      The patient will follow-up in 6 months for medication prescription refill and reevaluation. The patient was advised to contact the office should their symptoms worsen in the interim. The patient was agreeable and verbalized an understanding. History of Present Illness: The patient is a 61 y.o. male last seen on 3/29/23 who presents for a follow up office visit in regards to chronic pain secondary to low back pain, lumbar herniation, lumbar foraminal stenosis, lumbar radiculopathy and lumbar spondylosis. His last office visit was March 29, 2023, in which she was continued on gabapentin 300 mg 1 pill 3 times a day. The patient presents today with ongoing low back pain. He also feels pain in both knees. He is followed by orthopedics at [facility] for his knee pain, where he gets visco injections.   His pain occurs on occasion but mostly in the morning. He describes it as dull aching. He is rating his pain a 1/10 on the numeric rating scale. He recently started to feel pain on the top and soles of his feet    He is taking gabapentin 300 mg 1 pill 3 times a day which is providing moderate pain relief without side effects. He also takes ibuprofen 400 mg 2 times a day and Tylenol arthritis at night, which also helps. He underwent a lumbar translaminar epidural steroid injection directed to the left at L4-L5 with multi spread in 2021, which provided 80% pain relief        I have personally reviewed and/or updated the patient's past medical history, past surgical history, family history, social history, current medications, allergies, and vital signs today. Review of Systems:    Review of Systems   Musculoskeletal: Positive for arthralgias and gait problem. Past Medical History:   Diagnosis Date   • Arthritis    • Asthma    • Colon polyp    • Diverticulosis    • DVT (deep venous thrombosis) (HCC)     after knee sx   • Family hx colonic polyps     mother   • Family hx of colon cancer     maternal Aunt, Uncle   • Hyperlipidemia    • Peyronie disease        Past Surgical History:   Procedure Laterality Date   • COLONOSCOPY     • KNEE ARTHROSCOPY     • KNEE SURGERY Left    • MS ESOPHAGOGASTRODUODENOSCOPY TRANSORAL DIAGNOSTIC N/A 7/5/2017    Procedure: ESOPHAGOGASTRODUODENOSCOPY (EGD); Surgeon: Tori Farmer MD;  Location: [facility];   Service: Gastroenterology   • SHOULDER SURGERY         Family History   Problem Relation Age of Onset   • Other Mother         headaches   • Lung cancer Mother    • Lymphoma Father         acute   • Heart attack Father    • Esophageal cancer Father    • Colon cancer Family    • Other Family         headaches   • Skin cancer Family        Social History     Occupational History   • Occupation: employed   Tobacco Use   • Smoking status: Never   • Smokeless tobacco: Never   Substance and Sexual Activity   • Alcohol use: Yes     Comment: rarely   • Drug use: No   • Sexual activity: Not on file         Current Outpatient Medications:   •  acetaminophen (TYLENOL) 650 mg CR tablet, Take 650 mg by mouth every 8 (eight) hours as needed for mild pain, Disp: , Rfl:   •  albuterol (PROVENTIL HFA,VENTOLIN HFA) 90 mcg/act inhaler, Inhale 2 puffs every 4 (four) hours as needed for wheezing or shortness of breath, Disp: 18 g, Rfl: 0  •  ciprofloxacin-dexamethasone (CIPRODEX) otic suspension, Administer 4 drops to the right ear 2 (two) times a day for 7 days, Disp: 7.5 mL, Rfl: 0  •  gabapentin (NEURONTIN) 300 mg capsule, TAKE 1 CAPSULE BY MOUTH 3 TIMES A DAY, Disp: 270 capsule, Rfl: 1  •  Ibuprofen 200 MG CAPS, Take by mouth, Disp: , Rfl:   •  loratadine-pseudoephedrine (Wal-itin D 24 Hour)  mg per 24 hr tablet, Take 1 tablet by mouth daily, Disp: 90 tablet, Rfl: 1  •  Multiple Vitamin (MULTI-VITAMIN DAILY PO), Take by mouth, Disp: , Rfl:   •  sildenafil (Viagra) 100 mg tablet, Take as directed., Disp: 10 tablet, Rfl:   •  simvastatin (ZOCOR) 40 mg tablet, Take 1 tablet (40 mg total) by mouth daily, Disp: 90 tablet, Rfl: 0    Allergies   Allergen Reactions   • Bee Pollen    • Cat Hair Extract    • Pollen Extract        Physical Exam:    /70   Ht 6' 1" (1.854 m)   Wt 97.5 kg (215 lb)   SpO2 95%   BMI 28.37 kg/m²     Constitutional:normal, well developed, well nourished, alert, in no distress and non-toxic and no overt pain behavior.   Eyes:anicteric  HEENT:grossly intact  Neck:supple, symmetric, trachea midline and no masses   Pulmonary:even and unlabored  Cardiovascular:No edema or pitting edema present  Skin:Normal without rashes or lesions and well hydrated  Psychiatric:Mood and affect appropriate  Neurologic:Cranial Nerves II-XII grossly intact  Musculoskeletal:normal      Imaging    MRI LUMBAR SPINE WITHOUT CONTRAST     INDICATION: M54.50: Low back pain, unspecified  R93.7: Abnormal findings on diagnostic imaging of other parts of musculoskeletal system.     COMPARISON:  CT dated 9/21/2021     TECHNIQUE:  Sagittal T1, sagittal T2, sagittal inversion recovery, axial T1 and axial T2, coronal T2.    IMAGE QUALITY:  Diagnostic     FINDINGS:     VERTEBRAL BODIES:  There are 5 lumbar type vertebral bodies. Normal alignment of the lumbar spine. No spondylolysis or spondylolisthesis. No scoliosis. No compression fracture. Normal marrow signal is identified within the visualized bony   structures. No discrete marrow lesion.     SACRUM:  Normal signal within the sacrum. No evidence of insufficiency or stress fracture.     DISTAL CORD AND CONUS:  Normal size and signal within the distal cord and conus.     PARASPINAL SOFT TISSUES:  Paraspinal soft tissues are unremarkable.     LOWER THORACIC DISC SPACES:  Normal disc height and signal.  No disc herniation, canal stenosis or foraminal narrowing.     LUMBAR DISC SPACES:     L1-L2:  Mild bulge without stenosis.     L2-L3:  Disc desiccation with minimal loss of disc height. Mild bulge and facet hypertrophy without central canal or foraminal stenosis.     L3-L4:  Mild bilateral facet hypertrophy and ligamentum flavum infolding. There is a left foraminal disc herniation, extrusion type, exhibiting caudal and cranial migration and resulting in moderate left foraminal stenosis concerning for impingement of   the exiting left L3 nerve root. Correlate clinically.     L4-L5:  Bilateral facet hypertrophy and ligamentum flavum infolding with left-sided marginal osteophytes resulting in mild to moderate left foraminal encroachment. Correlate clinically for left L4 radicular symptoms. Central canal and right neural   foramen both remain patent.     L5-S1:  Disc desiccation with slight loss of disc height. Diffuse disc bulge with marginal osteophytes appears asymmetric to the left. Disc bulge abuts the descending left S1 nerve root.   Correlate clinically for radicular symptoms. Central canal   appears patent. Mild left foraminal stenosis identified.     IMPRESSION:  Mild to moderate spondylotic changes of the lumbar spine as detailed above. No critical stenosis. No orders to display         No orders of the defined types were placed in this encounter.

## 2023-09-21 LAB
ALBUMIN SERPL-MCNC: 4.3 G/DL (ref 3.6–5.1)
ALBUMIN/GLOB SERPL: 1.9 (CALC) (ref 1–2.5)
ALP SERPL-CCNC: 56 U/L (ref 35–144)
ALT SERPL-CCNC: 23 U/L (ref 9–46)
AST SERPL-CCNC: 22 U/L (ref 10–35)
BILIRUB SERPL-MCNC: 0.4 MG/DL (ref 0.2–1.2)
BUN SERPL-MCNC: 28 MG/DL (ref 7–25)
BUN/CREAT SERPL: 27 (CALC) (ref 6–22)
CALCIUM SERPL-MCNC: 9.3 MG/DL (ref 8.6–10.3)
CHLORIDE SERPL-SCNC: 103 MMOL/L (ref 98–110)
CHOLEST SERPL-MCNC: 164 MG/DL
CHOLEST/HDLC SERPL: 3.2 (CALC)
CO2 SERPL-SCNC: 32 MMOL/L (ref 20–32)
CREAT SERPL-MCNC: 1.05 MG/DL (ref 0.7–1.35)
GFR/BSA.PRED SERPLBLD CYS-BASED-ARV: 80 ML/MIN/1.73M2
GLOBULIN SER CALC-MCNC: 2.3 G/DL (CALC) (ref 1.9–3.7)
GLUCOSE SERPL-MCNC: 102 MG/DL (ref 65–99)
HDLC SERPL-MCNC: 51 MG/DL
LDLC SERPL CALC-MCNC: 96 MG/DL (CALC)
NONHDLC SERPL-MCNC: 113 MG/DL (CALC)
POTASSIUM SERPL-SCNC: 4 MMOL/L (ref 3.5–5.3)
PROT SERPL-MCNC: 6.6 G/DL (ref 6.1–8.1)
SODIUM SERPL-SCNC: 140 MMOL/L (ref 135–146)
TRIGL SERPL-MCNC: 81 MG/DL

## 2023-10-02 ENCOUNTER — OFFICE VISIT (OUTPATIENT)
Dept: FAMILY MEDICINE CLINIC | Facility: CLINIC | Age: 63
End: 2023-10-02
Payer: COMMERCIAL

## 2023-10-02 VITALS
HEIGHT: 73 IN | OXYGEN SATURATION: 98 % | DIASTOLIC BLOOD PRESSURE: 74 MMHG | WEIGHT: 214 LBS | BODY MASS INDEX: 28.36 KG/M2 | SYSTOLIC BLOOD PRESSURE: 142 MMHG | HEART RATE: 64 BPM

## 2023-10-02 DIAGNOSIS — E78.2 MIXED HYPERLIPIDEMIA: Primary | ICD-10-CM

## 2023-10-02 DIAGNOSIS — K40.90 NON-RECURRENT UNILATERAL INGUINAL HERNIA WITHOUT OBSTRUCTION OR GANGRENE: ICD-10-CM

## 2023-10-02 DIAGNOSIS — Z23 NEED FOR INFLUENZA VACCINATION: ICD-10-CM

## 2023-10-02 DIAGNOSIS — M17.0 PRIMARY OSTEOARTHRITIS OF BOTH KNEES: ICD-10-CM

## 2023-10-02 DIAGNOSIS — J45.20 MILD INTERMITTENT ASTHMA WITHOUT COMPLICATION: ICD-10-CM

## 2023-10-02 PROCEDURE — 99214 OFFICE O/P EST MOD 30 MIN: CPT | Performed by: FAMILY MEDICINE

## 2023-10-02 PROCEDURE — 90686 IIV4 VACC NO PRSV 0.5 ML IM: CPT | Performed by: FAMILY MEDICINE

## 2023-10-02 PROCEDURE — 90471 IMMUNIZATION ADMIN: CPT | Performed by: FAMILY MEDICINE

## 2023-10-02 NOTE — ASSESSMENT & PLAN NOTE
Possible right inguinal hernia. Follow-up with general surgery. If the patient has a bulge, or severe pain in the right groin, and it does not go away, would recommend emergency room.

## 2023-10-02 NOTE — PATIENT INSTRUCTIONS
1. Need for influenza vaccination  -     influenza vaccine, quadrivalent, 0.5 mL, preservative-free, for adult and pediatric patients 6 mos+ (AFLURIA, FLUARIX, FLULAVAL, FLUZONE)    2. Mixed hyperlipidemia  Assessment & Plan:  Patient's cholesterol is doing quite well. Continue on simvastatin 40. Recheck in 6 months. Orders:  -     Comprehensive metabolic panel; Future; Expected date: 04/02/2024  -     Lipid panel; Future; Expected date: 04/02/2024    3. Mild intermittent asthma without complication  Assessment & Plan:  Minimal issues with breathing. Continue only as needed use of albuterol, which she really has not needed to use lately at all, i.e. in the last year or so. I did discuss with him about Prevnar. He is up-to-date with influenza and COVID vaccines. 4. Primary osteoarthritis of both knees  Assessment & Plan:  Follow with Ortho. Using NSAIDs and Tylenol. Gets injections. 5. Non-recurrent unilateral inguinal hernia without obstruction or gangrene  Assessment & Plan:  Possible right inguinal hernia. Follow-up with general surgery. If the patient has a bulge, or severe pain in the right groin, and it does not go away, would recommend emergency room. Orders:  -     Ambulatory Referral to General Surgery; Future        COVID 19 Instructions    Yessica Case was advised to limit contact with others to essential tasks such as getting food, medications, and medical care. Proper handwashing reviewed, and Hand sanitzer when washing is not available. If the patient develops symptoms of COVID 19, the patient should call the office as soon as possible. For 0056-1310 Flu season, it is strongly recommended that Flu Vaccinations be obtained. Virtual Visits:  Lamar: This works on smart phones (any phone with Internet browsing capability). You should get a text message when the provider is ready to see you. Click on the link in the text message, and the call should start.   You will need to type in your name, and allow camera and microphone access. This is HIPPA compliant, and secure. If you have not already done so, get immunized to COVID 19. We are committed to getting you vaccinated as soon as possible and will be closely following CDC and [facility] guidelines as they are released and revised. Please refer to our COVID-19 vaccine webpage for the most up to date information on the vaccine and our distribution efforts. This site will also have the most up to date recommendations for COVID booster vaccine. [email or web address]    Call [phone] ([phone]), option 7    OUR NEW LOCATION:    [address], [address] [place] [address], [address] [place] [address]  Fax: [phone]    Lab services and OB/GYN are at this location as well.

## 2023-10-02 NOTE — PROGRESS NOTES
Name: Amie Wilson      : 1960      MRN: [id number]  Encounter Provider: Robel Maier MD  Encounter Date: 10/2/2023   Encounter department: [facility] PRIMARY CARE    Assessment & Plan     1. Mixed hyperlipidemia  Assessment & Plan:  Patient's cholesterol is doing quite well. Continue on simvastatin 40. Recheck in 6 months. Orders:  -     Comprehensive metabolic panel; Future; Expected date: 2024  -     Lipid panel; Future; Expected date: 2024    2. Mild intermittent asthma without complication  Assessment & Plan:  Minimal issues with breathing. Continue only as needed use of albuterol, which she really has not needed to use lately at all, i.e. in the last year or so. I did discuss with him about Prevnar. He is up-to-date with influenza and COVID vaccines. 3. Primary osteoarthritis of both knees  Assessment & Plan:  Follow with Ortho. Using NSAIDs and Tylenol. Gets injections. 4. Non-recurrent unilateral inguinal hernia without obstruction or gangrene  Assessment & Plan:  Possible right inguinal hernia. Follow-up with general surgery. If the patient has a bulge, or severe pain in the right groin, and it does not go away, would recommend emergency room. Orders:  -     Ambulatory Referral to General Surgery; Future    5. Need for influenza vaccination  -     influenza vaccine, quadrivalent, 0.5 mL, preservative-free, for adult and pediatric patients 6 mos+ (AFLURIA, FLUARIX, FLULAVAL, FLUZONE)           Subjective      Chief Complaint   Patient presents with   • Follow-up     Follow up to chronic conditions and review bw results. mjs      • Flu Vaccine       Patient is here for multiple issues. He did mention that he significant arthritis in his knees, and is looking at replacement at some point. Following with Dr. Michele Michel at [address] Patient reports he is doing extremely well as far as asthma is concerned.   Patient does have albuterol available, but has not needed to use it at all. His last inhaler  before he needed it. Noting some burning on feet lately. Is on bottom of feet. Did ask about this at pain management. Treated for tinea pedis with OTC's, and perhaps a bit less issues. Also, has some irritation on the right groin. Feels like he pulled something. Does not last.  Just noted occasionally. No injury. Irregularly happens. Pushing on the area increased the pain. Review of Systems   Constitutional: Negative. HENT: Negative. Eyes: Negative. Respiratory: Negative. Cardiovascular: Negative. Gastrointestinal: Negative. Endocrine: Negative. Genitourinary: Negative. Musculoskeletal: Positive for arthralgias and gait problem. Skin: Negative. Allergic/Immunologic: Negative. Hematological: Negative. Psychiatric/Behavioral: Negative. Current Outpatient Medications on File Prior to Visit   Medication Sig   • acetaminophen (TYLENOL) 650 mg CR tablet Take 650 mg by mouth every 8 (eight) hours as needed for mild pain   • albuterol (PROVENTIL HFA,VENTOLIN HFA) 90 mcg/act inhaler Inhale 2 puffs every 4 (four) hours as needed for wheezing or shortness of breath   • gabapentin (NEURONTIN) 300 mg capsule TAKE 1 CAPSULE BY MOUTH 3 TIMES A DAY   • Ibuprofen 200 MG CAPS Take by mouth   • loratadine-pseudoephedrine (Wal-itin D 24 Hour)  mg per 24 hr tablet Take 1 tablet by mouth daily   • Multiple Vitamin (MULTI-VITAMIN DAILY PO) Take by mouth   • sildenafil (Viagra) 100 mg tablet Take as directed. • simvastatin (ZOCOR) 40 mg tablet Take 1 tablet (40 mg total) by mouth daily   • ciprofloxacin-dexamethasone (CIPRODEX) otic suspension Administer 4 drops to the right ear 2 (two) times a day for 7 days       Objective     Blood sugar 102. Creatinine 1.05, GFR 80. Sodium 140, potassium 4.0, calcium 9.3. AST 22, ALT 23. Total cholesterol 164, LDL 96, HDL 51, triglycerides 81.     /74 Pulse 64   Ht 6' 1" (1.854 m)   Wt 97.1 kg (214 lb)   SpO2 98%   BMI 28.23 kg/m²     Physical Exam  Vitals and nursing note reviewed. Constitutional:       Appearance: He is well-developed. HENT:      Head: Normocephalic and atraumatic. Cardiovascular:      Rate and Rhythm: Normal rate and regular rhythm. Pulses:           Carotid pulses are 2+ on the right side and 2+ on the left side. Heart sounds: Normal heart sounds. No murmur heard. No friction rub. No gallop. Pulmonary:      Effort: Pulmonary effort is normal. No respiratory distress. Breath sounds: Normal breath sounds. No wheezing or rales. Musculoskeletal:      Cervical back: Normal range of motion and neck supple.        Charmayne Fruits, MD

## 2023-10-02 NOTE — ASSESSMENT & PLAN NOTE
Minimal issues with breathing. Continue only as needed use of albuterol, which she really has not needed to use lately at all, i.e. in the last year or so. I did discuss with him about Prevnar. He is up-to-date with influenza and COVID vaccines.

## 2023-11-17 DIAGNOSIS — H60.391 OTHER INFECTIVE ACUTE OTITIS EXTERNA OF RIGHT EAR: ICD-10-CM

## 2023-11-17 DIAGNOSIS — J30.9 ALLERGIC RHINITIS, UNSPECIFIED SEASONALITY, UNSPECIFIED TRIGGER: ICD-10-CM

## 2023-11-20 ENCOUNTER — TELEPHONE (OUTPATIENT)
Dept: FAMILY MEDICINE CLINIC | Facility: CLINIC | Age: 63
End: 2023-11-20

## 2023-11-20 ENCOUNTER — PATIENT MESSAGE (OUTPATIENT)
Dept: FAMILY MEDICINE CLINIC | Facility: CLINIC | Age: 63
End: 2023-11-20

## 2023-11-20 DIAGNOSIS — H60.391 OTHER INFECTIVE ACUTE OTITIS EXTERNA OF RIGHT EAR: ICD-10-CM

## 2023-11-20 RX ORDER — LORATADINE 10 MG
1 TABLET ORAL DAILY
Qty: 90 TABLET | Refills: 0 | Status: SHIPPED | OUTPATIENT
Start: 2023-11-20

## 2023-11-20 RX ORDER — CIPROFLOXACIN AND DEXAMETHASONE 3; 1 MG/ML; MG/ML
4 SUSPENSION/ DROPS AURICULAR (OTIC) 2 TIMES DAILY
Qty: 7.5 ML | Refills: 0 | Status: SHIPPED | OUTPATIENT
Start: 2023-11-20 | End: 2023-11-27

## 2023-11-20 RX ORDER — CIPROFLOXACIN AND DEXAMETHASONE 3; 1 MG/ML; MG/ML
4 SUSPENSION/ DROPS AURICULAR (OTIC) 2 TIMES DAILY
Qty: 7.5 ML | Refills: 0 | Status: CANCELLED | OUTPATIENT
Start: 2023-11-20 | End: 2023-11-27

## 2023-11-20 NOTE — TELEPHONE ENCOUNTER
----- Message from Rachael Watts sent at 11/20/2023  9:23 AM EST -----  Regarding: FW:  - ciprofloxacin-dexamethasone (CIPRODEX) otic suspension  Contact: [phone]  Dr. Abdullahi Fontanez, you denied the Rx. Will you reconsider from this message? He was her 10/2 to see TH.  ----- Message -----  From: Lang JerichoEustacio Hodgkin"  Sent: 11/20/2023   9:18 AM EST  To: [facility]  Subject:  - ciprofloxacin-dexamethasone ([facility]) ot#    Hi DR Kelley Headings. I requested a refill on the  - ciprofloxacin-dexamethasone (CIPRODEX) otic suspension ear drops that you prescribed for a problem that I have been having with my ears. During a past visit we discussed that my outer ear canals have become very itchy, dry and inflamed. The  - ciprofloxacin-dexamethasone (CIPRODEX) otic suspension worked very well to relieve the problem and things were good for a while but the flakey, itchiness has returned. I have tried over the counter ear relief items. They have provided SOME relief with the itchiness but my outer ear canal remain sore and flakey. is there a reason that another round of the  - ciprofloxacin-dexamethasone (CIPRODEX) otic suspension was denied?       Thanks,  Eustacio Hodgkin

## 2023-11-22 ENCOUNTER — CONSULT (OUTPATIENT)
Dept: SURGERY | Facility: CLINIC | Age: 63
End: 2023-11-22
Payer: COMMERCIAL

## 2023-11-22 VITALS
SYSTOLIC BLOOD PRESSURE: 137 MMHG | RESPIRATION RATE: 16 BRPM | WEIGHT: 214.8 LBS | DIASTOLIC BLOOD PRESSURE: 81 MMHG | HEIGHT: 73 IN | HEART RATE: 71 BPM | TEMPERATURE: 96.4 F | BODY MASS INDEX: 28.47 KG/M2

## 2023-11-22 DIAGNOSIS — K40.90 NON-RECURRENT UNILATERAL INGUINAL HERNIA WITHOUT OBSTRUCTION OR GANGRENE: ICD-10-CM

## 2023-11-22 DIAGNOSIS — K40.20 NON-RECURRENT BILATERAL INGUINAL HERNIA WITHOUT OBSTRUCTION OR GANGRENE: Primary | ICD-10-CM

## 2023-11-22 PROCEDURE — 99243 OFF/OP CNSLTJ NEW/EST LOW 30: CPT | Performed by: SURGERY

## 2023-11-22 NOTE — PROGRESS NOTES
Assessment/Plan:    Non-recurrent bilateral inguinal hernia without obstruction or gangrene  He has bilateral ankle hernias on exam.  Recommend proceeding with robotic repair of bilateral inguinal hernias with mesh at [facility].  He also has a subcentimeter umbilical hernia which discussed could fix the same time if he wanted but require separate incision due to distance and technique. Currently is not a great time for 1 month recovery. He is can contact the office when he is ready to return for evaluation for scheduling. Diagnoses and all orders for this visit:    Non-recurrent bilateral inguinal hernia without obstruction or gangrene    Non-recurrent unilateral inguinal hernia without obstruction or gangrene  -     Ambulatory Referral to General Surgery          Subjective:      Patient ID: Ajay Sharma is a 61 y.o. male. Mr. Michelle Schwartz is a 61 old gentleman here for evaluation of a right inguinal hernia. He states he noticed some point in the right groin and brought attention of his primary care physician who diagnosed with a right inguinal hernia and sent him for evaluation. He has some straining or some fullness in this area with straining. He denies nausea vomiting fevers or chills. The following portions of the patient's history were reviewed and updated as appropriate: allergies, current medications, past family history, past medical history, past social history, past surgical history, and problem list.    Review of Systems   Constitutional:  Negative for chills and fever. HENT:  Negative for ear pain and sore throat. Eyes:  Negative for pain and visual disturbance. Respiratory:  Negative for cough and shortness of breath. Cardiovascular:  Negative for chest pain and palpitations. Gastrointestinal:  Negative for abdominal pain and vomiting. Genitourinary:  Negative for dysuria and hematuria. Musculoskeletal:  Negative for arthralgias and back pain. Skin:  Negative for color change and rash. Neurological:  Negative for seizures and syncope. Psychiatric/Behavioral:  Negative for agitation and behavioral problems. All other systems reviewed and are negative. Objective:      /81   Pulse 71   Temp (!) 96.4 °F (35.8 °C)   Resp 16   Ht 6' 1" (1.854 m)   Wt 97.4 kg (214 lb 12.8 oz)   BMI 28.34 kg/m²          Physical Exam  Vitals and nursing note reviewed. Exam conducted with a chaperone present. Constitutional:       Appearance: Normal appearance. Cardiovascular:      Rate and Rhythm: Normal rate and regular rhythm. Pulmonary:      Effort: Pulmonary effort is normal.      Breath sounds: Normal breath sounds. Abdominal:      General: There is no distension. Palpations: Abdomen is soft. There is no mass. Tenderness: There is no abdominal tenderness. Comments: Bilateral inguinal hernias   Neurological:      Mental Status: He is alert.    Psychiatric:         Mood and Affect: Mood normal.         Behavior: Behavior normal.

## 2023-11-27 DIAGNOSIS — E78.2 MIXED HYPERLIPIDEMIA: ICD-10-CM

## 2023-11-27 PROBLEM — K40.20 NON-RECURRENT BILATERAL INGUINAL HERNIA WITHOUT OBSTRUCTION OR GANGRENE: Status: ACTIVE | Noted: 2023-11-27

## 2023-11-27 RX ORDER — SIMVASTATIN 40 MG
40 TABLET ORAL DAILY
Qty: 90 TABLET | Refills: 1 | Status: SHIPPED | OUTPATIENT
Start: 2023-11-27

## 2023-11-27 NOTE — ASSESSMENT & PLAN NOTE
He has bilateral ankle hernias on exam.  Recommend proceeding with robotic repair of bilateral inguinal hernias with mesh at [facility].  He also has a subcentimeter umbilical hernia which discussed could fix the same time if he wanted but require separate incision due to distance and technique. Currently is not a great time for 1 month recovery. He is can contact the office when he is ready to return for evaluation for scheduling.

## 2024-02-08 ENCOUNTER — TELEPHONE (OUTPATIENT)
Age: 64
End: 2024-02-08

## 2024-02-08 NOTE — TELEPHONE ENCOUNTER
Pt called in w/ questions re: hernia surgery w/ dr. Median whom he had a consult w/ last year; transferred to [facility]

## 2024-02-20 LAB — HBA1C MFR BLD HPLC: 5.7 %

## 2024-02-21 ENCOUNTER — OFFICE VISIT (OUTPATIENT)
Dept: SURGERY | Facility: CLINIC | Age: 64
End: 2024-02-21
Payer: COMMERCIAL

## 2024-02-21 VITALS
DIASTOLIC BLOOD PRESSURE: 77 MMHG | HEIGHT: 73 IN | TEMPERATURE: 98.6 F | HEART RATE: 75 BPM | SYSTOLIC BLOOD PRESSURE: 120 MMHG | WEIGHT: 218 LBS | BODY MASS INDEX: 28.89 KG/M2 | RESPIRATION RATE: 19 BRPM | OXYGEN SATURATION: 97 %

## 2024-02-21 DIAGNOSIS — K42.9 UMBILICAL HERNIA WITHOUT OBSTRUCTION AND WITHOUT GANGRENE: ICD-10-CM

## 2024-02-21 DIAGNOSIS — K42.9 UMBILICAL HERNIA WITHOUT OBSTRUCTION OR GANGRENE: ICD-10-CM

## 2024-02-21 DIAGNOSIS — K40.20 NON-RECURRENT BILATERAL INGUINAL HERNIA WITHOUT OBSTRUCTION OR GANGRENE: Primary | ICD-10-CM

## 2024-02-21 PROCEDURE — 99213 OFFICE O/P EST LOW 20 MIN: CPT | Performed by: SURGERY

## 2024-02-21 RX ORDER — SODIUM CHLORIDE, SODIUM LACTATE, POTASSIUM CHLORIDE, CALCIUM CHLORIDE 600; 310; 30; 20 MG/100ML; MG/100ML; MG/100ML; MG/100ML
125 INJECTION, SOLUTION INTRAVENOUS CONTINUOUS
OUTPATIENT
Start: 2024-04-18

## 2024-02-21 NOTE — PROGRESS NOTES
Assessment/Plan:    Non-recurrent bilateral inguinal hernia without obstruction or gangrene  Will plan on a robotic repair of bilateral inguinal hernias at [facility].  The risks benefits alternative explained to him is agreeable to proceed.    Umbilical hernia without obstruction and without gangrene  Like his umbilical hernia fixed at same time.  I explained that this has to be 3 separate incision as the need to the robotic trocars to be further away from the pubic bone.  He understands this and is agreeable to proceed.  Will plan for primary repair of his umbilical hernia at the same time         Diagnoses and all orders for this visit:    Non-recurrent bilateral inguinal hernia without obstruction or gangrene  -     Case request operating room: REPAIR HERNIA INGUINAL LAPAROSCOPIC W/ ROBOTICS, REPAIR HERNIA UMBILICAL; Standing  -     CBC and differential; Future  -     EKG 12 lead; Future  -     Case request operating room: REPAIR HERNIA INGUINAL LAPAROSCOPIC W/ ROBOTICS, REPAIR HERNIA UMBILICAL    Umbilical hernia without obstruction or gangrene  -     Case request operating room: REPAIR HERNIA INGUINAL LAPAROSCOPIC W/ ROBOTICS, REPAIR HERNIA UMBILICAL; Standing  -     CBC and differential; Future  -     EKG 12 lead; Future  -     Case request operating room: REPAIR HERNIA INGUINAL LAPAROSCOPIC W/ ROBOTICS, REPAIR HERNIA UMBILICAL    Umbilical hernia without obstruction and without gangrene    Other orders  -     Diet NPO; Sips with meds; Standing  -     Apply Sequential Compression Device; Standing  -     Place sequential compression device; Standing            Subjective:      Patient ID: Naldo Fox is a 63 y.o. male.    Mr. Fox is a 63 old gentleman here for evaluation of a right inguinal hernia.  He states he noticed some point in the right groin and brought attention of his primary care physician who diagnosed with a right inguinal hernia and sent him for evaluation.  He has  "some straining or some fullness in this area with straining.  He denies nausea vomiting fevers or chills.    2/21/2024 he returns now for reevaluation.  He overall is asymptomatic but occasionally has some twinges in the groins.  Denies nausea vomiting fevers or chills.        The following portions of the patient's history were reviewed and updated as appropriate: allergies, current medications, past family history, past medical history, past social history, past surgical history, and problem list.    Review of Systems   Constitutional:  Negative for chills and fever.   HENT:  Negative for ear pain and sore throat.    Eyes:  Negative for pain and visual disturbance.   Respiratory:  Negative for cough and shortness of breath.    Cardiovascular:  Negative for chest pain and palpitations.   Gastrointestinal:  Negative for abdominal pain and vomiting.   Genitourinary:  Negative for dysuria and hematuria.   Musculoskeletal:  Negative for arthralgias and back pain.   Skin:  Negative for color change and rash.   Neurological:  Negative for seizures and syncope.   Psychiatric/Behavioral:  Negative for agitation and behavioral problems.    All other systems reviewed and are negative.        Objective:      /77 (BP Location: Left arm, Patient Position: Sitting, Cuff Size: Large)   Pulse 75   Temp 98.6 °F (37 °C) (Temporal)   Resp 19   Ht 6' 1\" (1.854 m)   Wt 98.9 kg (218 lb)   SpO2 97%   BMI 28.76 kg/m²          Physical Exam  Vitals and nursing note reviewed. Exam conducted with a chaperone present.   Constitutional:       Appearance: Normal appearance.   Cardiovascular:      Rate and Rhythm: Normal rate and regular rhythm.   Pulmonary:      Effort: Pulmonary effort is normal.      Breath sounds: Normal breath sounds.   Abdominal:      General: There is no distension.      Palpations: Abdomen is soft. There is no mass.      Tenderness: There is no abdominal tenderness.      Comments: Bilateral inguinal hernias " mostly unchanged   Neurological:      Mental Status: He is alert.   Psychiatric:         Mood and Affect: Mood normal.         Behavior: Behavior normal.

## 2024-02-21 NOTE — ASSESSMENT & PLAN NOTE
Will plan on a robotic repair of bilateral inguinal hernias at [facility].  The risks benefits alternative explained to him is agreeable to proceed.

## 2024-02-21 NOTE — ASSESSMENT & PLAN NOTE
Like his umbilical hernia fixed at same time.  I explained that this has to be 3 separate incision as the need to the robotic trocars to be further away from the pubic bone.  He understands this and is agreeable to proceed.  Will plan for primary repair of his umbilical hernia at the same time

## 2024-03-11 ENCOUNTER — OFFICE VISIT (OUTPATIENT)
Dept: PAIN MEDICINE | Facility: CLINIC | Age: 64
End: 2024-03-11
Payer: COMMERCIAL

## 2024-03-11 VITALS
WEIGHT: 218 LBS | SYSTOLIC BLOOD PRESSURE: 148 MMHG | BODY MASS INDEX: 28.89 KG/M2 | TEMPERATURE: 98 F | DIASTOLIC BLOOD PRESSURE: 80 MMHG | HEIGHT: 73 IN

## 2024-03-11 DIAGNOSIS — M47.816 LUMBAR SPONDYLOSIS: Primary | ICD-10-CM

## 2024-03-11 DIAGNOSIS — G89.4 CHRONIC PAIN SYNDROME: ICD-10-CM

## 2024-03-11 DIAGNOSIS — M99.79 FORAMINAL STENOSIS DUE TO INTERVERTEBRAL DISC DISEASE: ICD-10-CM

## 2024-03-11 DIAGNOSIS — M54.16 LUMBAR RADICULOPATHY: ICD-10-CM

## 2024-03-11 DIAGNOSIS — M51.26 LUMBAR DISC HERNIATION: ICD-10-CM

## 2024-03-11 DIAGNOSIS — M51.9 FORAMINAL STENOSIS DUE TO INTERVERTEBRAL DISC DISEASE: ICD-10-CM

## 2024-03-11 PROCEDURE — 99214 OFFICE O/P EST MOD 30 MIN: CPT | Performed by: NURSE PRACTITIONER

## 2024-03-11 RX ORDER — GABAPENTIN 300 MG/1
CAPSULE ORAL
Qty: 270 CAPSULE | Refills: 1 | Status: SHIPPED | OUTPATIENT
Start: 2024-03-11

## 2024-03-11 RX ORDER — GABAPENTIN 300 MG/1
CAPSULE ORAL
Qty: 270 CAPSULE | Refills: 1 | Status: SHIPPED | OUTPATIENT
Start: 2024-03-11 | End: 2024-03-11 | Stop reason: SDUPTHER

## 2024-03-11 NOTE — PROGRESS NOTES
Assessment:  1. Lumbar spondylosis    2. Lumbar disc herniation    3. Foraminal stenosis due to intervertebral disc disease    4. Lumbar radiculopathy    5. Chronic pain syndrome        Plan:  The patient is a 63 y.o. male last seen on 09/11/2023 who presents for a follow up office visit in regards to chronic pain secondary to low back pain, lumbar disc herniation, lumbar foraminal stenosis, lumbar radiculopathy and lumbar spondylosis.  The patient presents today with ongoing low back pain.  The pain has been stable since the last office visit.  He is taking gabapentin 300 mg 1 tablet 3 times a day which is providing 95% pain relief without side effects.  Therefore, I will continue him on the medication as prescribed.  Refills for gabapentin 300 mg were electronically sent to the pharmacy    The patient will follow-up in 6 months for medication prescription refill and reevaluation. The patient was advised to contact the office should their symptoms worsen in the interim. The patient was agreeable and verbalized an understanding.        History of Present Illness:    The patient is a 63 y.o. male last seen on 09/11/2023 who presents for a follow up office visit in regards to chronic pain secondary to low back pain, lumbar disc herniation, lumbar foraminal stenosis, lumbar radiculopathy and lumbar spondylosis.  His last office visit was September 11, 2023, in which he was continued on gabapentin.  He was also ordered an EMG of bilateral lower extremities to evaluate for neuropathy.  He did not have the EMG performed.    Patient presents today with ongoing low back pain.  The pain remains unchanged since the last office visit.  It occurs on occasion but mostly in the evening.  He describes it as dull aching.  He is rating his pain a 1/10 on the numeric rating scale.    He is taking gabapentin 300 mg 1 pill 3 times a day is providing moderate pain relief. He does not mental fog from the medication.  He feels the  medication provides 95% pain relief without side effects. He uses ibuprofen and Tylenol arthritis as needed as well.    He underwent a lumbar translaminar epidural steroid injection directed to the left at L4-L5 with multi spread in 2021, which provided 80% pain relief    The patient is scheduled to have a bilateral inguinal hernia and umbilical hernia repair April 18, 2024 with Dr. Medina and a left TKA in August 2024 with Dr. Beasley [facility]    I have personally reviewed and/or updated the patient's past medical history, past surgical history, family history, social history, current medications, allergies, and vital signs today.       Review of Systems:    Review of Systems   Respiratory:  Negative for shortness of breath.    Cardiovascular:  Negative for chest pain.   Gastrointestinal:  Negative for constipation, diarrhea, nausea and vomiting.   Musculoskeletal:  Positive for back pain and gait problem. Negative for arthralgias, joint swelling and myalgias.   Skin:  Negative for rash.   Neurological:  Negative for dizziness, seizures and weakness.   All other systems reviewed and are negative.        Past Medical History:   Diagnosis Date    Arthritis     Asthma     Colon polyp     Diverticulosis     DVT (deep venous thrombosis) (HCC)     after knee sx    Family hx colonic polyps     mother    Family hx of colon cancer     maternal Aunt, Uncle    Hyperlipidemia     Peyronie disease        Past Surgical History:   Procedure Laterality Date    COLONOSCOPY      KNEE ARTHROSCOPY      KNEE SURGERY Left     MO ESOPHAGOGASTRODUODENOSCOPY TRANSORAL DIAGNOSTIC N/A 7/5/2017    Procedure: ESOPHAGOGASTRODUODENOSCOPY (EGD);  Surgeon: Emmanuel Payne MD;  Location: [facility];  Service: Gastroenterology    SHOULDER SURGERY         Family History   Problem Relation Age of Onset    Other Mother         headaches    Lung cancer Mother     Lymphoma Father         acute    Heart attack Father     Esophageal cancer Father     Colon  cancer Family     Other Family         headaches    Skin cancer Family        Social History     Occupational History    Occupation: employed   Tobacco Use    Smoking status: Never    Smokeless tobacco: Never   Vaping Use    Vaping status: Never Used   Substance and Sexual Activity    Alcohol use: Yes     Comment: rarely    Drug use: No    Sexual activity: Not on file         Current Outpatient Medications:     acetaminophen (TYLENOL) 650 mg CR tablet, Take 650 mg by mouth every 8 (eight) hours as needed for mild pain, Disp: , Rfl:     albuterol (PROVENTIL HFA,VENTOLIN HFA) 90 mcg/act inhaler, Inhale 2 puffs every 4 (four) hours as needed for wheezing or shortness of breath, Disp: 18 g, Rfl: 0    ciprofloxacin-dexamethasone (CIPRODEX) otic suspension, Administer 4 drops to the right ear 2 (two) times a day for 7 days, Disp: 7.5 mL, Rfl: 0    gabapentin (NEURONTIN) 300 mg capsule, TAKE 1 CAPSULE BY MOUTH 3 TIMES A DAY, Disp: 270 capsule, Rfl: 1    Ibuprofen 200 MG CAPS, Take by mouth, Disp: , Rfl:     loratadine-pseudoephedrine (Wal-itin D 24 Hour)  mg per 24 hr tablet, Take 1 tablet by mouth daily, Disp: 90 tablet, Rfl: 0    Multiple Vitamin (MULTI-VITAMIN DAILY PO), Take by mouth, Disp: , Rfl:     sildenafil (Viagra) 100 mg tablet, Take as directed., Disp: 10 tablet, Rfl:     simvastatin (ZOCOR) 40 mg tablet, Take 1 tablet (40 mg total) by mouth daily, Disp: 90 tablet, Rfl: 1    Allergies   Allergen Reactions    Bee Pollen     Cat Hair Extract     Pollen Extract        Physical Exam:    There were no vitals taken for this visit.    Constitutional:normal, well developed, well nourished, alert, in no distress and non-toxic and no overt pain behavior.  Eyes:anicteric  HEENT:grossly intact  Neck:supple, symmetric, trachea midline and no masses   Pulmonary:even and unlabored  Cardiovascular:No edema or pitting edema present  Skin:Normal without rashes or lesions and well hydrated  Psychiatric:Mood and affect  appropriate  Neurologic:Cranial Nerves II-XII grossly intact  Musculoskeletal:normal      Imaging  Narrative & Impression   MRI LUMBAR SPINE WITHOUT CONTRAST     INDICATION: M54.50: Low back pain, unspecified  R93.7: Abnormal findings on diagnostic imaging of other parts of musculoskeletal system.     COMPARISON:  CT dated 9/21/2021     TECHNIQUE:  Sagittal T1, sagittal T2, sagittal inversion recovery, axial T1 and axial T2, coronal T2.    IMAGE QUALITY:  Diagnostic     FINDINGS:     VERTEBRAL BODIES:  There are 5 lumbar type vertebral bodies.  Normal alignment of the lumbar spine.  No spondylolysis or spondylolisthesis. No scoliosis.  No compression fracture.    Normal marrow signal is identified within the visualized bony   structures.  No discrete marrow lesion.     SACRUM:  Normal signal within the sacrum. No evidence of insufficiency or stress fracture.     DISTAL CORD AND CONUS:  Normal size and signal within the distal cord and conus.     PARASPINAL SOFT TISSUES:  Paraspinal soft tissues are unremarkable.     LOWER THORACIC DISC SPACES:  Normal disc height and signal.  No disc herniation, canal stenosis or foraminal narrowing.     LUMBAR DISC SPACES:     L1-L2:  Mild bulge without stenosis.     L2-L3:  Disc desiccation with minimal loss of disc height.  Mild bulge and facet hypertrophy without central canal or foraminal stenosis.     L3-L4:  Mild bilateral facet hypertrophy and ligamentum flavum infolding.  There is a left foraminal disc herniation, extrusion type, exhibiting caudal and cranial migration and resulting in moderate left foraminal stenosis concerning for impingement of   the exiting left L3 nerve root.  Correlate clinically.     L4-L5:  Bilateral facet hypertrophy and ligamentum flavum infolding with left-sided marginal osteophytes resulting in mild to moderate left foraminal encroachment.  Correlate clinically for left L4 radicular symptoms.  Central canal and right neural   foramen both remain  patent.     L5-S1:  Disc desiccation with slight loss of disc height.  Diffuse disc bulge with marginal osteophytes appears asymmetric to the left.  Disc bulge abuts the descending left S1 nerve root.  Correlate clinically for radicular symptoms.  Central canal   appears patent.  Mild left foraminal stenosis identified.     IMPRESSION:  Mild to moderate spondylotic changes of the lumbar spine as detailed above.  No critical stenosis.          No orders to display         No orders of the defined types were placed in this encounter.

## 2024-03-26 ENCOUNTER — TELEPHONE (OUTPATIENT)
Age: 64
End: 2024-03-26

## 2024-03-26 NOTE — TELEPHONE ENCOUNTER
Patient called he had question about the pre work test he has to have done before his surgery 04/18/2024, warmed transferred to [facility] was done.

## 2024-03-28 ENCOUNTER — APPOINTMENT (OUTPATIENT)
Dept: LAB | Facility: CLINIC | Age: 64
End: 2024-03-28
Payer: COMMERCIAL

## 2024-03-28 DIAGNOSIS — K42.9 UMBILICAL HERNIA WITHOUT OBSTRUCTION OR GANGRENE: ICD-10-CM

## 2024-03-28 DIAGNOSIS — K40.20 NON-RECURRENT BILATERAL INGUINAL HERNIA WITHOUT OBSTRUCTION OR GANGRENE: ICD-10-CM

## 2024-03-28 LAB
BASOPHILS # BLD AUTO: 0.02 THOUSANDS/ÂΜL (ref 0–0.1)
BASOPHILS NFR BLD AUTO: 0 % (ref 0–1)
EOSINOPHIL # BLD AUTO: 0.1 THOUSAND/ÂΜL (ref 0–0.61)
EOSINOPHIL NFR BLD AUTO: 1 % (ref 0–6)
ERYTHROCYTE [DISTWIDTH] IN BLOOD BY AUTOMATED COUNT: 12.7 % (ref 11.6–15.1)
HCT VFR BLD AUTO: 48.5 % (ref 36.5–49.3)
HGB BLD-MCNC: 15.5 G/DL (ref 12–17)
IMM GRANULOCYTES # BLD AUTO: 0.03 THOUSAND/UL (ref 0–0.2)
IMM GRANULOCYTES NFR BLD AUTO: 0 % (ref 0–2)
LYMPHOCYTES # BLD AUTO: 2.34 THOUSANDS/ÂΜL (ref 0.6–4.47)
LYMPHOCYTES NFR BLD AUTO: 31 % (ref 14–44)
MCH RBC QN AUTO: 31.1 PG (ref 26.8–34.3)
MCHC RBC AUTO-ENTMCNC: 32 G/DL (ref 31.4–37.4)
MCV RBC AUTO: 97 FL (ref 82–98)
MONOCYTES # BLD AUTO: 0.5 THOUSAND/ÂΜL (ref 0.17–1.22)
MONOCYTES NFR BLD AUTO: 7 % (ref 4–12)
NEUTROPHILS # BLD AUTO: 4.64 THOUSANDS/ÂΜL (ref 1.85–7.62)
NEUTS SEG NFR BLD AUTO: 61 % (ref 43–75)
NRBC BLD AUTO-RTO: 0 /100 WBCS
PLATELET # BLD AUTO: 279 THOUSANDS/UL (ref 149–390)
PMV BLD AUTO: 10.5 FL (ref 8.9–12.7)
RBC # BLD AUTO: 4.98 MILLION/UL (ref 3.88–5.62)
WBC # BLD AUTO: 7.63 THOUSAND/UL (ref 4.31–10.16)

## 2024-03-28 PROCEDURE — 85025 COMPLETE CBC W/AUTO DIFF WBC: CPT

## 2024-03-28 PROCEDURE — 36415 COLL VENOUS BLD VENIPUNCTURE: CPT

## 2024-04-01 ENCOUNTER — TELEPHONE (OUTPATIENT)
Age: 64
End: 2024-04-01

## 2024-04-06 ENCOUNTER — OFFICE VISIT (OUTPATIENT)
Dept: LAB | Facility: HOSPITAL | Age: 64
End: 2024-04-06
Payer: COMMERCIAL

## 2024-04-06 DIAGNOSIS — K40.20 NON-RECURRENT BILATERAL INGUINAL HERNIA WITHOUT OBSTRUCTION OR GANGRENE: ICD-10-CM

## 2024-04-06 DIAGNOSIS — K42.9 UMBILICAL HERNIA WITHOUT OBSTRUCTION OR GANGRENE: ICD-10-CM

## 2024-04-06 PROCEDURE — 93005 ELECTROCARDIOGRAM TRACING: CPT

## 2024-04-08 ENCOUNTER — TELEPHONE (OUTPATIENT)
Age: 64
End: 2024-04-08

## 2024-04-08 ENCOUNTER — OFFICE VISIT (OUTPATIENT)
Dept: FAMILY MEDICINE CLINIC | Facility: CLINIC | Age: 64
End: 2024-04-08
Payer: COMMERCIAL

## 2024-04-08 ENCOUNTER — TELEPHONE (OUTPATIENT)
Dept: FAMILY MEDICINE CLINIC | Facility: CLINIC | Age: 64
End: 2024-04-08

## 2024-04-08 VITALS
OXYGEN SATURATION: 97 % | DIASTOLIC BLOOD PRESSURE: 76 MMHG | HEART RATE: 78 BPM | SYSTOLIC BLOOD PRESSURE: 138 MMHG | WEIGHT: 219.2 LBS | HEIGHT: 73 IN | BODY MASS INDEX: 29.05 KG/M2

## 2024-04-08 DIAGNOSIS — M47.816 LUMBAR SPONDYLOSIS: ICD-10-CM

## 2024-04-08 DIAGNOSIS — K42.9 UMBILICAL HERNIA WITHOUT OBSTRUCTION AND WITHOUT GANGRENE: ICD-10-CM

## 2024-04-08 DIAGNOSIS — C44.519 BASAL CELL CARCINOMA OF SKIN OF TRUNK, EXCEPT SCROTUM: ICD-10-CM

## 2024-04-08 DIAGNOSIS — Z12.5 PROSTATE CANCER SCREENING: ICD-10-CM

## 2024-04-08 DIAGNOSIS — J30.9 ALLERGIC RHINITIS, UNSPECIFIED SEASONALITY, UNSPECIFIED TRIGGER: ICD-10-CM

## 2024-04-08 DIAGNOSIS — E78.2 MIXED HYPERLIPIDEMIA: Primary | ICD-10-CM

## 2024-04-08 DIAGNOSIS — M17.0 PRIMARY OSTEOARTHRITIS OF BOTH KNEES: ICD-10-CM

## 2024-04-08 DIAGNOSIS — K40.20 NON-RECURRENT BILATERAL INGUINAL HERNIA WITHOUT OBSTRUCTION OR GANGRENE: ICD-10-CM

## 2024-04-08 DIAGNOSIS — H60.391 OTHER INFECTIVE ACUTE OTITIS EXTERNA OF RIGHT EAR: ICD-10-CM

## 2024-04-08 DIAGNOSIS — Z80.0 FAMILY HISTORY OF COLON CANCER: ICD-10-CM

## 2024-04-08 DIAGNOSIS — J45.20 MILD INTERMITTENT ASTHMA WITHOUT COMPLICATION: ICD-10-CM

## 2024-04-08 PROBLEM — B35.4 TINEA CORPORIS: Status: RESOLVED | Noted: 2019-04-12 | Resolved: 2024-04-08

## 2024-04-08 PROCEDURE — 99214 OFFICE O/P EST MOD 30 MIN: CPT | Performed by: FAMILY MEDICINE

## 2024-04-08 RX ORDER — SIMVASTATIN 40 MG
40 TABLET ORAL DAILY
Qty: 90 TABLET | Refills: 1 | Status: SHIPPED | OUTPATIENT
Start: 2024-04-08

## 2024-04-08 RX ORDER — CIPROFLOXACIN AND DEXAMETHASONE 3; 1 MG/ML; MG/ML
4 SUSPENSION/ DROPS AURICULAR (OTIC) 2 TIMES DAILY
Qty: 7.5 ML | Refills: 0 | Status: SHIPPED | OUTPATIENT
Start: 2024-04-08 | End: 2024-04-15

## 2024-04-08 RX ORDER — LORATADINE 10 MG
1 TABLET ORAL DAILY
Qty: 90 TABLET | Refills: 1 | Status: SHIPPED | OUTPATIENT
Start: 2024-04-08

## 2024-04-08 NOTE — ASSESSMENT & PLAN NOTE
Planning on knee replacement surgery sometime in this year with [facility], Dr. Beasley.

## 2024-04-08 NOTE — TELEPHONE ENCOUNTER
Pt was in office toady, pt will like to get lab orders for him to get done before his next office visit in October.  When done, please mail to pt.

## 2024-04-08 NOTE — PROGRESS NOTES
Name: Naldo Fox      : 1960      MRN: [id number]  Encounter Provider: Deshawn Varma MD  Encounter Date: 2024   Encounter department: [facility]    Assessment & Plan     1. Mixed hyperlipidemia  Assessment & Plan:  Cholesterol doing quite well.  Continue simvastatin.  Check in 6 months.    Orders:  -     simvastatin (ZOCOR) 40 mg tablet; Take 1 tablet (40 mg total) by mouth daily    2. Mild intermittent asthma without complication  Assessment & Plan:  Minimal to no problems currently.  Does have an albuterol inhaler, but really does not need to use it.  Could consider adding Singulair in the future.        3. Primary osteoarthritis of both knees  Assessment & Plan:  Planning on knee replacement surgery sometime in this year with [facility], Dr. Beasley.      4. Basal cell carcinoma of skin of trunk, except scrotum  Assessment & Plan:  Patient does continue to follow with dermatology.  No change.      5. Prostate cancer screening  Comments:  PSA is stable.  Reviewed RAFFAELE.  Check 1 year.    6. Other infective acute otitis externa of right ear  Comments:  Irritation in the ear canal.  Recommend Ciprodex.  Does not appear to be the eardrum itself.  Follow in 2 to 4 weeks if needed.  Orders:  -     ciprofloxacin-dexamethasone (CIPRODEX) otic suspension; Administer 4 drops to the right ear 2 (two) times a day for 7 days    7. Allergic rhinitis, unspecified seasonality, unspecified trigger  Assessment & Plan:  Uses Claritin-D daily.  Problems no specific issues.  Would hold the day before surgery.    Orders:  -     loratadine-pseudoephedrine (Wal-itin D 24 Hour)  mg per 24 hr tablet; Take 1 tablet by mouth daily    8. Lumbar spondylosis  Assessment & Plan:  Following with pain management.  Currently using gabapentin; ibuprofen 600 twice daily; Tylenol arthritis, 650 mg 2 at at bedtime.    Advised him to stop the ibuprofen 1 week prior to the  surgeries.      9. Non-recurrent bilateral inguinal hernia without obstruction or gangrene  Assessment & Plan:  Surgery next week planned.      10. Umbilical hernia without obstruction and without gangrene  Assessment & Plan:  Patient having surgery next week to repair.      11. Family history of colon cancer  Assessment & Plan:  Colonoscopy done previously.  Recommend follow-up with colorectal.  They recommended a 5-year repeat, which would mean 2026.             Subjective      Patient is here alone multiple issues.    He has been taking an increased dosage of anti-inflammatories and Tylenol lately for some discomfort.  Currently he has knee issues and back issues.    Patient is following with pain management for his back pain.  Does use Neurontin along with this.    Osteoarthritis in the right knee.  Patient will be seeing Dr. Beasley at [facility] for knee replacement.    Hernia: Has surgery for this scheduled.    Ear itch: Using Ciprodex before. Helps while taking it.  OTC spray helping a bit.    Elevated bp at pain management lately.        Review of Systems   Constitutional: Negative.    HENT: Negative.     Eyes: Negative.    Respiratory: Negative.     Cardiovascular: Negative.    Gastrointestinal: Negative.    Endocrine: Negative.    Genitourinary: Negative.    Musculoskeletal: Negative.    Skin: Negative.    Allergic/Immunologic: Negative.    Neurological: Negative.    Hematological: Negative.    Psychiatric/Behavioral: Negative.         Current Outpatient Medications on File Prior to Visit   Medication Sig   • acetaminophen (TYLENOL) 650 mg CR tablet Take 650 mg by mouth every 8 (eight) hours as needed for mild pain   • albuterol (PROVENTIL HFA,VENTOLIN HFA) 90 mcg/act inhaler Inhale 2 puffs every 4 (four) hours as needed for wheezing or shortness of breath   • gabapentin (NEURONTIN) 300 mg capsule TAKE 1 CAPSULE BY MOUTH 3 TIMES A DAY   • Ibuprofen 200 MG CAPS Take by mouth   • Multiple Vitamin (MULTI-VITAMIN  "DAILY PO) Take by mouth   • sildenafil (Viagra) 100 mg tablet Take as directed.   • [DISCONTINUED] loratadine-pseudoephedrine (Wal-itin D 24 Hour)  mg per 24 hr tablet Take 1 tablet by mouth daily   • [DISCONTINUED] simvastatin (ZOCOR) 40 mg tablet Take 1 tablet (40 mg total) by mouth daily   • [DISCONTINUED] ciprofloxacin-dexamethasone (CIPRODEX) otic suspension Administer 4 drops to the right ear 2 (two) times a day for 7 days       Objective     Labs from his [employer] sponsored health care, 20 February 2024,:  Total cholesterol 174, , HDL 52, triglycerides 118.  Blood sugar 100.  Creatinine 0.96 GFR 89.  Sodium 140, potassium 4.8, calcium 9.9.  AST 24, ALT 26.  A1c 5.7.  Nicotine negative.  White count 7.0, hemoglobin 15.4, hematocrit 44.1.  Platelets 278.  T4: 6.3  PSA 0.61.  2023 PSA: 0.57.    /76 (BP Location: Right arm, Patient Position: Sitting, Cuff Size: Large)   Pulse 78   Ht 6' 1\" (1.854 m)   Wt 99.4 kg (219 lb 3.2 oz)   SpO2 97%   BMI 28.92 kg/m²     Physical Exam  Vitals and nursing note reviewed.   Constitutional:       Appearance: Normal appearance. He is well-developed.   HENT:      Head: Normocephalic and atraumatic.      Ears:      Comments: Some redness and irritation of the ear canal on the right, with some scaling.  Left appears normal.  Cardiovascular:      Rate and Rhythm: Normal rate and regular rhythm.      Pulses:           Carotid pulses are 2+ on the right side and 2+ on the left side.     Heart sounds: Normal heart sounds. No murmur heard.     No friction rub. No gallop.   Pulmonary:      Effort: Pulmonary effort is normal. No respiratory distress.      Breath sounds: Normal breath sounds. No wheezing or rales.   Musculoskeletal:      Cervical back: Normal range of motion and neck supple.   Neurological:      Mental Status: He is alert.       Deshawn Varma MD    "

## 2024-04-08 NOTE — PATIENT INSTRUCTIONS
1. Mixed hyperlipidemia  Assessment & Plan:  Cholesterol doing quite well.  Continue simvastatin.  Check in 6 months.    Orders:  -     simvastatin (ZOCOR) 40 mg tablet; Take 1 tablet (40 mg total) by mouth daily    2. Mild intermittent asthma without complication  Assessment & Plan:  Minimal to no problems currently.  Does have an albuterol inhaler, but really does not need to use it.  Could consider adding Singulair in the future.        3. Primary osteoarthritis of both knees  Assessment & Plan:  Planning on knee replacement surgery sometime in this year with [facility], Dr. Beasley.      4. Basal cell carcinoma of skin of trunk, except scrotum  Assessment & Plan:  Patient does continue to follow with dermatology.  No change.      5. Prostate cancer screening  Comments:  PSA is stable.  Reviewed RAFFAELE.  Check 1 year.    6. Other infective acute otitis externa of right ear  Comments:  Irritation in the ear canal.  Recommend Ciprodex.  Does not appear to be the eardrum itself.  Follow in 2 to 4 weeks if needed.  Orders:  -     ciprofloxacin-dexamethasone (CIPRODEX) otic suspension; Administer 4 drops to the right ear 2 (two) times a day for 7 days    7. Allergic rhinitis, unspecified seasonality, unspecified trigger  Assessment & Plan:  Uses Claritin-D daily.  Problems no specific issues.  Would hold the day before surgery.    Orders:  -     loratadine-pseudoephedrine (Wal-itin D 24 Hour)  mg per 24 hr tablet; Take 1 tablet by mouth daily    8. Lumbar spondylosis  Assessment & Plan:  Following with pain management.  Currently using gabapentin; ibuprofen 600 twice daily; Tylenol arthritis, 650 mg 2 at at bedtime.    Advised him to stop the ibuprofen 1 week prior to the surgeries.      9. Non-recurrent bilateral inguinal hernia without obstruction or gangrene  Assessment & Plan:  Surgery next week planned.      10. Umbilical hernia without obstruction and without gangrene  Assessment & Plan:  Patient  having surgery next week to repair.      11. Family history of colon cancer  Assessment & Plan:  Colonoscopy done previously.  Recommend follow-up with colorectal.  They recommended a 5-year repeat, which would mean 2026.          COVID 19 Instructions    Naldo Fox was advised to limit contact with others to essential tasks such as getting food, medications, and medical care.    Proper handwashing reviewed, and Hand sanitzer when washing is not available.    If the patient develops symptoms of COVID 19, the patient should call the office as soon as possible.    It is strongly recommended that Flu Vaccinations be obtained.      Virtual Visits:  Lamar: This works on smart phones (any phone with Internet browsing capability).  You should get a text message when the provider is ready to see you.  Click on the link in the text message, and the call should start.  You will need to type in your name, and allow camera and microphone access.  This is HIPPA compliant, and secure.      If you have not already done so, get immunized to COVID 19.      We are committed to getting you vaccinated as soon as possible and will be closely following CDC and [facility] guidelines as they are released and revised.  Please refer to our COVID-19 vaccine webpage for the most up to date information on the vaccine and our distribution efforts.    This site will also have the most up to date recommendations for COVID booster vaccine.    [email or web address]    Call [phone]-[phone] ([phone]), option 7    You can also visit https://www.vaccines.gov/ to find vaccines in your area.    OUR LOCATION:    [facility]  [address] [place] [address], [address]  [place], [place], [address] [phone]  Fax: [phone]    Lab services, Rheumatology, and OB/GYN are at this location as well.

## 2024-04-08 NOTE — ASSESSMENT & PLAN NOTE
Colonoscopy done previously.  Recommend follow-up with colorectal.  They recommended a 5-year repeat, which would mean 2026.

## 2024-04-08 NOTE — TELEPHONE ENCOUNTER
PA for loratadine-pseudoephedrine (Wal-itin D 24 Hour)  mg     Submitted via    [][employer]   [x][employer]-Case ID # [id number]   []Faxed to plan   []Other website   []Phone call Case ID #     Office notes sent, clinical questions answered. Awaiting determination    Turnaround time for your insurance to make a decision on your Prior Authorization can take 7-21 business days.

## 2024-04-08 NOTE — ASSESSMENT & PLAN NOTE
Minimal to no problems currently.  Does have an albuterol inhaler, but really does not need to use it.  Could consider adding Singulair in the future.

## 2024-04-08 NOTE — ASSESSMENT & PLAN NOTE
Following with pain management.  Currently using gabapentin; ibuprofen 600 twice daily; Tylenol arthritis, 650 mg 2 at at bedtime.    Advised him to stop the ibuprofen 1 week prior to the surgeries.

## 2024-04-09 LAB
ATRIAL RATE: 76 BPM
P AXIS: 5 DEGREES
PR INTERVAL: 176 MS
QRS AXIS: 29 DEGREES
QRSD INTERVAL: 94 MS
QT INTERVAL: 360 MS
QTC INTERVAL: 405 MS
T WAVE AXIS: 43 DEGREES
VENTRICULAR RATE: 76 BPM

## 2024-04-09 PROCEDURE — 93010 ELECTROCARDIOGRAM REPORT: CPT | Performed by: INTERNAL MEDICINE

## 2024-04-09 NOTE — TELEPHONE ENCOUNTER
PA for  loratadine-pseudoephedrine (Wal-itin D 24 Hour)  mg Denied    Reason:              Message sent to provider pool Yes    Denial letter scanned into Media Yes    Appeal started No

## 2024-04-09 NOTE — TELEPHONE ENCOUNTER
Please tell the patient that his Claritin-D has been denied by the insurance.  He can certainly purchase this over-the-counter as well.

## 2024-04-09 NOTE — PRE-PROCEDURE INSTRUCTIONS
Pre-Surgery Instructions:   Medication Instructions    acetaminophen (TYLENOL) 650 mg CR tablet Uses PRN- OK to take day of surgery    albuterol (PROVENTIL HFA,VENTOLIN HFA) 90 mcg/act inhaler Uses PRN- OK to take day of surgery    ciprofloxacin-dexamethasone (CIPRODEX) otic suspension Hold day of surgery.    gabapentin (NEURONTIN) 300 mg capsule Take day of surgery.    Ibuprofen 200 MG CAPS Stop taking 7 days prior to surgery.    loratadine-pseudoephedrine (Wal-itin D 24 Hour)  mg per 24 hr tablet Stop taking 1 day prior to surgery.    Multiple Vitamin (MULTI-VITAMIN DAILY PO) Stop taking 7 days prior to surgery.    sildenafil (Viagra) 100 mg tablet Hold day of surgery.    simvastatin (ZOCOR) 40 mg tablet Take day of surgery.    Medication instructions for day surgery reviewed. Please use only a sip of water to take your instructed medications. Avoid all over the counter vitamins, supplements and NSAIDS for one week prior to surgery per anesthesia guidelines. Tylenol is ok to take as needed.     You will receive a call one business day prior to surgery with an arrival time and hospital directions. If your surgery is scheduled on a Monday, the hospital will be calling you on the Friday prior to your surgery. If you have not heard from anyone by 8pm, please call the hospital supervisor through the hospital  at [phone]. ([place] [phone] or [place] [phone]).    Do not eat or drink anything after midnight the night before your surgery, including candy, mints, lifesavers, or chewing gum. Do not drink alcohol 24hrs before your surgery. Try not to smoke at least 24hrs before your surgery.       Follow the pre surgery showering instructions as listed in the “My Surgical Experience Booklet” or otherwise provided by your surgeon's office. Do not use a blade to shave the surgical area 1 week before surgery. It is okay to use a clean electric clippers up to 24 hours before surgery. Do not apply  any lotions, creams, including makeup, cologne, deodorant, or perfumes after showering on the day of your surgery. Do not use dry shampoo, hair spray, hair gel, or any type of hair products.     No contact lenses, eye make-up, or artificial eyelashes. Remove nail polish, including gel polish, and any artificial, gel, or acrylic nails if possible. Remove all jewelry including rings and body piercing jewelry.     Wear causal clothing that is easy to take on and off. Consider your type of surgery.    Keep any valuables, jewelry, piercings at home. Please bring any specially ordered equipment (sling, braces) if indicated.    Arrange for a responsible person to drive you to and from the hospital on the day of your surgery. Please confirm the visitor policy for the day of your procedure when you receive your phone call with an arrival time.     Call the surgeon's office with any new illnesses, exposures, or additional questions prior to surgery.    Please reference your “My Surgical Experience Booklet” for additional information to prepare for your upcoming surgery.

## 2024-04-16 ENCOUNTER — TELEPHONE (OUTPATIENT)
Age: 64
End: 2024-04-16

## 2024-04-16 ENCOUNTER — ANESTHESIA EVENT (OUTPATIENT)
Dept: PERIOP | Facility: HOSPITAL | Age: 64
End: 2024-04-16
Payer: COMMERCIAL

## 2024-04-16 NOTE — TELEPHONE ENCOUNTER
Pt would like to know approx how long hernia surgery takes.  He would like to let him ride know how long he will be waiting.  I tried the office but they were busy so I told the pt someone from the office will give him a call back

## 2024-04-18 ENCOUNTER — ANESTHESIA (OUTPATIENT)
Dept: PERIOP | Facility: HOSPITAL | Age: 64
End: 2024-04-18
Payer: COMMERCIAL

## 2024-04-18 ENCOUNTER — HOSPITAL ENCOUNTER (OUTPATIENT)
Facility: HOSPITAL | Age: 64
Setting detail: OUTPATIENT SURGERY
Discharge: HOME | End: 2024-04-18
Attending: SURGERY | Admitting: SURGERY
Payer: COMMERCIAL

## 2024-04-18 VITALS
WEIGHT: 212.74 LBS | DIASTOLIC BLOOD PRESSURE: 60 MMHG | SYSTOLIC BLOOD PRESSURE: 113 MMHG | TEMPERATURE: 97.7 F | HEART RATE: 69 BPM | BODY MASS INDEX: 28.07 KG/M2 | RESPIRATION RATE: 16 BRPM | OXYGEN SATURATION: 95 %

## 2024-04-18 DIAGNOSIS — K40.20 NON-RECURRENT BILATERAL INGUINAL HERNIA WITHOUT OBSTRUCTION OR GANGRENE: ICD-10-CM

## 2024-04-18 DIAGNOSIS — K42.9 UMBILICAL HERNIA WITHOUT OBSTRUCTION OR GANGRENE: ICD-10-CM

## 2024-04-18 PROBLEM — Z86.718 HISTORY OF DVT (DEEP VEIN THROMBOSIS): Status: ACTIVE | Noted: 2024-04-18

## 2024-04-18 PROCEDURE — 88302 TISSUE EXAM BY PATHOLOGIST: CPT | Performed by: PATHOLOGY

## 2024-04-18 PROCEDURE — S2900 ROBOTIC SURGICAL SYSTEM: HCPCS | Performed by: SURGERY

## 2024-04-18 PROCEDURE — 49650 LAP ING HERNIA REPAIR INIT: CPT | Performed by: PHYSICIAN ASSISTANT

## 2024-04-18 PROCEDURE — NC001 PR NO CHARGE: Performed by: SURGERY

## 2024-04-18 PROCEDURE — 49650 LAP ING HERNIA REPAIR INIT: CPT | Performed by: SURGERY

## 2024-04-18 PROCEDURE — C1781 MESH (IMPLANTABLE): HCPCS | Performed by: SURGERY

## 2024-04-18 DEVICE — 3DMAX™ MID ANATOMICAL MESH, XL, LEFT, 5” X 7”, 12 X 17 CM
Type: IMPLANTABLE DEVICE | Site: INGUINAL | Status: FUNCTIONAL
Brand: 3DMAX™ MID ANATOMICAL MESH

## 2024-04-18 DEVICE — 3DMAX™ MID ANATOMICAL MESH, XL, RIGHT, 5” X 7”, 12 X 17 CM
Type: IMPLANTABLE DEVICE | Site: INGUINAL | Status: FUNCTIONAL
Brand: 3DMAX™ MID ANATOMICAL MESH

## 2024-04-18 RX ORDER — SODIUM CHLORIDE, SODIUM LACTATE, POTASSIUM CHLORIDE, CALCIUM CHLORIDE 600; 310; 30; 20 MG/100ML; MG/100ML; MG/100ML; MG/100ML
125 INJECTION, SOLUTION INTRAVENOUS CONTINUOUS
Status: DISCONTINUED | OUTPATIENT
Start: 2024-04-18 | End: 2024-04-18 | Stop reason: HOSPADM

## 2024-04-18 RX ORDER — LIDOCAINE HYDROCHLORIDE 20 MG/ML
INJECTION, SOLUTION EPIDURAL; INFILTRATION; INTRACAUDAL; PERINEURAL AS NEEDED
Status: DISCONTINUED | OUTPATIENT
Start: 2024-04-18 | End: 2024-04-18

## 2024-04-18 RX ORDER — ONDANSETRON 2 MG/ML
INJECTION INTRAMUSCULAR; INTRAVENOUS AS NEEDED
Status: DISCONTINUED | OUTPATIENT
Start: 2024-04-18 | End: 2024-04-18

## 2024-04-18 RX ORDER — FENTANYL CITRATE 50 UG/ML
INJECTION, SOLUTION INTRAMUSCULAR; INTRAVENOUS AS NEEDED
Status: DISCONTINUED | OUTPATIENT
Start: 2024-04-18 | End: 2024-04-18

## 2024-04-18 RX ORDER — DEXAMETHASONE SODIUM PHOSPHATE 10 MG/ML
INJECTION, SOLUTION INTRAMUSCULAR; INTRAVENOUS AS NEEDED
Status: DISCONTINUED | OUTPATIENT
Start: 2024-04-18 | End: 2024-04-18

## 2024-04-18 RX ORDER — CEFAZOLIN SODIUM 2 G/50ML
2000 SOLUTION INTRAVENOUS ONCE
Status: COMPLETED | OUTPATIENT
Start: 2024-04-18 | End: 2024-04-18

## 2024-04-18 RX ORDER — KETOROLAC TROMETHAMINE 30 MG/ML
INJECTION, SOLUTION INTRAMUSCULAR; INTRAVENOUS AS NEEDED
Status: DISCONTINUED | OUTPATIENT
Start: 2024-04-18 | End: 2024-04-18

## 2024-04-18 RX ORDER — MIDAZOLAM HYDROCHLORIDE 2 MG/2ML
INJECTION, SOLUTION INTRAMUSCULAR; INTRAVENOUS AS NEEDED
Status: DISCONTINUED | OUTPATIENT
Start: 2024-04-18 | End: 2024-04-18

## 2024-04-18 RX ORDER — OXYCODONE HYDROCHLORIDE AND ACETAMINOPHEN 5; 325 MG/1; MG/1
2 TABLET ORAL EVERY 6 HOURS PRN
Status: DISCONTINUED | OUTPATIENT
Start: 2024-04-18 | End: 2024-04-18 | Stop reason: HOSPADM

## 2024-04-18 RX ORDER — MAGNESIUM HYDROXIDE 1200 MG/15ML
LIQUID ORAL AS NEEDED
Status: DISCONTINUED | OUTPATIENT
Start: 2024-04-18 | End: 2024-04-18 | Stop reason: HOSPADM

## 2024-04-18 RX ORDER — VECURONIUM BROMIDE 1 MG/ML
INJECTION, POWDER, LYOPHILIZED, FOR SOLUTION INTRAVENOUS AS NEEDED
Status: DISCONTINUED | OUTPATIENT
Start: 2024-04-18 | End: 2024-04-18

## 2024-04-18 RX ORDER — OXYCODONE HYDROCHLORIDE 5 MG/1
5 TABLET ORAL EVERY 4 HOURS PRN
Qty: 12 TABLET | Refills: 0 | Status: SHIPPED | OUTPATIENT
Start: 2024-04-18 | End: 2024-04-28

## 2024-04-18 RX ORDER — HYDROMORPHONE HCL/PF 1 MG/ML
SYRINGE (ML) INJECTION AS NEEDED
Status: DISCONTINUED | OUTPATIENT
Start: 2024-04-18 | End: 2024-04-18

## 2024-04-18 RX ORDER — ACETAMINOPHEN 10 MG/ML
1000 INJECTION, SOLUTION INTRAVENOUS ONCE
Status: COMPLETED | OUTPATIENT
Start: 2024-04-18 | End: 2024-04-18

## 2024-04-18 RX ORDER — OXYCODONE HYDROCHLORIDE AND ACETAMINOPHEN 5; 325 MG/1; MG/1
1 TABLET ORAL EVERY 4 HOURS PRN
Status: DISCONTINUED | OUTPATIENT
Start: 2024-04-18 | End: 2024-04-18 | Stop reason: HOSPADM

## 2024-04-18 RX ORDER — HYDROMORPHONE HCL/PF 1 MG/ML
0.5 SYRINGE (ML) INJECTION
Status: DISCONTINUED | OUTPATIENT
Start: 2024-04-18 | End: 2024-04-18 | Stop reason: HOSPADM

## 2024-04-18 RX ORDER — MEPERIDINE HYDROCHLORIDE 25 MG/ML
12.5 INJECTION INTRAMUSCULAR; INTRAVENOUS; SUBCUTANEOUS
Status: DISCONTINUED | OUTPATIENT
Start: 2024-04-18 | End: 2024-04-18 | Stop reason: HOSPADM

## 2024-04-18 RX ORDER — BUPIVACAINE HYDROCHLORIDE AND EPINEPHRINE 2.5; 5 MG/ML; UG/ML
INJECTION, SOLUTION EPIDURAL; INFILTRATION; INTRACAUDAL; PERINEURAL AS NEEDED
Status: DISCONTINUED | OUTPATIENT
Start: 2024-04-18 | End: 2024-04-18 | Stop reason: HOSPADM

## 2024-04-18 RX ORDER — FENTANYL CITRATE/PF 50 MCG/ML
25 SYRINGE (ML) INJECTION
Status: DISCONTINUED | OUTPATIENT
Start: 2024-04-18 | End: 2024-04-18 | Stop reason: HOSPADM

## 2024-04-18 RX ORDER — ONDANSETRON 2 MG/ML
4 INJECTION INTRAMUSCULAR; INTRAVENOUS ONCE AS NEEDED
Status: DISCONTINUED | OUTPATIENT
Start: 2024-04-18 | End: 2024-04-18 | Stop reason: HOSPADM

## 2024-04-18 RX ORDER — PROPOFOL 10 MG/ML
INJECTION, EMULSION INTRAVENOUS AS NEEDED
Status: DISCONTINUED | OUTPATIENT
Start: 2024-04-18 | End: 2024-04-18

## 2024-04-18 RX ADMIN — SODIUM CHLORIDE, SODIUM LACTATE, POTASSIUM CHLORIDE, AND CALCIUM CHLORIDE: .6; .31; .03; .02 INJECTION, SOLUTION INTRAVENOUS at 17:19

## 2024-04-18 RX ADMIN — ONDANSETRON 4 MG: 2 INJECTION INTRAMUSCULAR; INTRAVENOUS at 15:24

## 2024-04-18 RX ADMIN — CEFAZOLIN SODIUM 2000 MG: 2 SOLUTION INTRAVENOUS at 15:20

## 2024-04-18 RX ADMIN — VECURONIUM BROMIDE 1 MG: 1 INJECTION, POWDER, LYOPHILIZED, FOR SOLUTION INTRAVENOUS at 16:06

## 2024-04-18 RX ADMIN — HYDROMORPHONE HYDROCHLORIDE 0.5 MG: 1 INJECTION, SOLUTION INTRAMUSCULAR; INTRAVENOUS; SUBCUTANEOUS at 15:44

## 2024-04-18 RX ADMIN — HYDROMORPHONE HYDROCHLORIDE 0.5 MG: 1 INJECTION, SOLUTION INTRAMUSCULAR; INTRAVENOUS; SUBCUTANEOUS at 16:32

## 2024-04-18 RX ADMIN — VECURONIUM BROMIDE 2 MG: 1 INJECTION, POWDER, LYOPHILIZED, FOR SOLUTION INTRAVENOUS at 16:44

## 2024-04-18 RX ADMIN — VECURONIUM BROMIDE 6 MG: 1 INJECTION, POWDER, LYOPHILIZED, FOR SOLUTION INTRAVENOUS at 15:16

## 2024-04-18 RX ADMIN — FENTANYL CITRATE 50 MCG: 50 INJECTION INTRAMUSCULAR; INTRAVENOUS at 15:15

## 2024-04-18 RX ADMIN — LIDOCAINE HYDROCHLORIDE 40 MG: 20 INJECTION, SOLUTION EPIDURAL; INFILTRATION; INTRACAUDAL at 15:16

## 2024-04-18 RX ADMIN — SUGAMMADEX 200 MG: 100 INJECTION, SOLUTION INTRAVENOUS at 17:31

## 2024-04-18 RX ADMIN — SUGAMMADEX 100 MG: 100 INJECTION, SOLUTION INTRAVENOUS at 17:39

## 2024-04-18 RX ADMIN — KETOROLAC TROMETHAMINE 15 MG: 30 INJECTION, SOLUTION INTRAMUSCULAR; INTRAVENOUS at 17:10

## 2024-04-18 RX ADMIN — DEXAMETHASONE SODIUM PHOSPHATE 8 MG: 10 INJECTION INTRAMUSCULAR; INTRAVENOUS at 15:24

## 2024-04-18 RX ADMIN — PROPOFOL 200 MG: 10 INJECTION, EMULSION INTRAVENOUS at 15:16

## 2024-04-18 RX ADMIN — MIDAZOLAM 2 MG: 1 INJECTION INTRAMUSCULAR; INTRAVENOUS at 15:12

## 2024-04-18 RX ADMIN — ACETAMINOPHEN 1000 MG: 10 INJECTION INTRAVENOUS at 14:49

## 2024-04-18 RX ADMIN — SODIUM CHLORIDE, SODIUM LACTATE, POTASSIUM CHLORIDE, AND CALCIUM CHLORIDE: .6; .31; .03; .02 INJECTION, SOLUTION INTRAVENOUS at 15:45

## 2024-04-18 RX ADMIN — SODIUM CHLORIDE, SODIUM LACTATE, POTASSIUM CHLORIDE, AND CALCIUM CHLORIDE 125 ML/HR: .6; .31; .03; .02 INJECTION, SOLUTION INTRAVENOUS at 13:40

## 2024-04-18 NOTE — OP NOTE
OPERATIVE REPORT  PATIENT NAME: Naldo Fox    :  1960  MRN: [id number]  Pt Location: [facility] OR ROOM 08    SURGERY DATE: 2024    Surgeons and Role:     * Sukhdev Medina MD - Primary     * Rolan Maddox PA-C - Assisting     * Adebayo Ortega MD - Assisting    Preop Diagnosis:  Non-recurrent bilateral inguinal hernia without obstruction or gangrene [K40.20]  Umbilical hernia without obstruction or gangrene [K42.9]    Post-Op Diagnosis Codes:     * Non-recurrent bilateral inguinal hernia without obstruction or gangrene [K40.20]     * Umbilical hernia without obstruction or gangrene [K42.9]    Procedure(s):  Bilateral - REPAIR HERNIA INGUINAL  W/ ROBOTICS WITH MESH  REPAIR HERNIA UMBILICAL    Specimen(s):  ID Type Source Tests Collected by Time Destination   1 :  Tissue Hernia Sac, Umbilical TISSUE EXAM Sukhdev Medina MD 2024 1537    2 :  Tissue Hernia Sac, Right Inguinal TISSUE EXAM Sukhdev Medina MD 2024 1719        Estimated Blood Loss:   Minimal    Drains:  Urethral Catheter Latex 16 Fr. (Active)   Number of days: 0       Anesthesia Type:   General    Operative Indications:  Non-recurrent bilateral inguinal hernia without obstruction or gangrene [K40.20]  Umbilical hernia without obstruction or gangrene [K42.9]      Operative Findings:  Bilateral indirect inguinal hernia    Complications:   None    Procedure and Technique:  The patient was seen again in the Holding Room. The risks, benefits, complications, treatment options, and expected outcomes were discussed with the patient. The possibilities of reaction to medication, pulmonary aspiration, perforation of viscus, bleeding, postoperative short or long term nerve entrapment causing pain,recurrent infection, the need for additional procedures, and development of a complication requiring transfusion or further operation were discussed with the patient and/or family. There was concurrence with the proposed plan, and informed consent was  obtained. The site of surgery was properly noted/marked. The patient was taken to the Operating Room, identified as Naldo Fox and the procedure verified as hernia repair. A Time Out was held and the above information confirmed.    The patient was prepped and draped in a sterile fashion.  A timeout was again performed.  Local anesthesia was used in the incision.  An infraumbilical incision was made with a scalpel.  Tissue sent with Bovie cautery.  The hernia sac was freed off of the umbilical stalk.  Then.  Was dissected off of the fascia.  The hernia sac was transected.  The anterior fascia is freed up circumferentially. An  8 mm trocar was placed in under direct visualization. The abdomen was inflated and the camera was placed in.. Two8 mm trocars were placed lateral to rectus muscle approximately at the level of the umbilicus.  At this point the patient was placed into Trendelenburg position and the robot was docked and the instruments were placed in.  The patient was noted to have bilateral inguinal hernia .   The peritoneum was incised from the medial umbilical fold out laterally past the internal ring on the right.  Next the direct space was mobilized by exposing Andrew's ligament all the way along its length to the pubic tubercle. IThe indirect hernia sac this was carefully mobilized off the cord structures with care to avoid injury to the gonadal vessels or spermatic cord. The remainder of the inferior flap was created. At this point  Bard 3D max mesh was selected and placed into the preperitoneal space. The mesh was deployed and placed in the appropriate position.  The edge of the peritoneum was well below the edge of the mesh.  The mesh secured with a 2-0 Vicryl suture at Andrew's.  The peritoneum was then sutured closed with the V lock suture.      The peritoneum was incised from the medial umbilical fold out laterally past the internal ring on the left. Next the direct space was mobilized by  exposing Andrew's ligament all the way along its length to the pubic tubercle. The indirect hernia sac this was carefully mobilized off the cord structures with care to avoid injury to the gonadal vessels or spermatic cord. The remainder of the inferior flap was created. At this point  Bard 3D max mesh was selected and placed into the preperitoneal space. The mesh was deployed and placed in the appropriate position.  The edge of the peritoneum was well below the edge of the mesh.  The mesh was secured with 2-0 Vicryl suture at Andrew's.  The peritoneum was then sutured closed with the V lock suture.     Now the repair was complete the robot was undocked.  Umbilical hernia was closed with 2 figure-of-eight 2-0 Prolene sutures.  The umbilical stalk was attached with 3-0 Vicryl suture.  The wound was closed in multiple layers using 3-0 Vicryl sutures and the skin closed using a 4-0 Monocryl subcuticular stitch. The wound was dressed.  The patient was anatomically correct at the end of the procedure.  The patient tolerated the procedure in good condition.    Instrument, sponge, and needle counts were correct prior to closure and at the conclusion of the case.    The PA was essential for assistance with abdominal access and docking the robot as well as retraction and suturing.      This text is generated with voice recognition software. There may be translation, syntax,  or grammatical errors. If you have any questions, please contact the dictating provider.      I was present for the entire procedure.    Patient Disposition:  PACU         SIGNATURE: Sukhdev Medina MD  DATE: April 18, 2024  TIME: 5:22 PM

## 2024-04-18 NOTE — H&P
History & Physical    Naldo Fox    63 y.o.  male  [id number]  Surgeon:Sukhdev Medina MD  Date: April 18, 2024    Assessment:  Patient Active Problem List   Diagnosis    Allergic rhinitis    Asthma    Basal cell carcinoma of skin of trunk, except scrotum    GERD (gastroesophageal reflux disease)    Erectile dysfunction of non-organic origin    Hyperlipidemia    Primary osteoarthritis    Migraine headache    Eczema    Seborrheic keratosis    Solar degeneration    Acute left-sided low back pain without sciatica    Foraminal stenosis due to intervertebral disc disease    Lumbar disc herniation    Lumbar foraminal stenosis    Lumbar radiculopathy    Lumbar spondylosis    Chronic pain syndrome    Inguinal hernia    Non-recurrent bilateral inguinal hernia without obstruction or gangrene    Umbilical hernia without obstruction and without gangrene    Family history of colon cancer    History of DVT (deep vein thrombosis)     Plan:  Naldo Fox is scheduled for robotic bilateral inguinal hernia repair with mesh.  Umbilical hernia repair.    HPI    Historical Information   Past Medical History:   Diagnosis Date    Arthritis     Asthma     Colon polyp     Diverticulosis     DVT (deep venous thrombosis) (HCC)     after knee sx    Family hx colonic polyps     mother    Family hx of colon cancer     maternal Aunt, Uncle    Hyperlipidemia     Osteoarthritis     Peyronie disease      Past Surgical History:   Procedure Laterality Date    COLONOSCOPY      KNEE ARTHROSCOPY      KNEE SURGERY Left     ORTHOPEDIC SURGERY      VA ESOPHAGOGASTRODUODENOSCOPY TRANSORAL DIAGNOSTIC N/A 07/05/2017    Procedure: ESOPHAGOGASTRODUODENOSCOPY (EGD);  Surgeon: Emmanuel Payne MD;  Location: [facility];  Service: Gastroenterology    SHOULDER SURGERY       Social History   Social History     Substance and Sexual Activity   Alcohol Use Yes    Alcohol/week: 1.0 standard drink of alcohol    Types: 1 Cans of beer per week    Comment:  Occasionally will have a beer. Normally weeks between     Social History     Substance and Sexual Activity   Drug Use No     Social History     Tobacco Use   Smoking Status Never    Passive exposure: Never   Smokeless Tobacco Never     Family History   Problem Relation Age of Onset    Other Mother         headaches    Lung cancer Mother     Cancer Mother     Lymphoma Father         acute    Heart attack Father     Esophageal cancer Father     Cancer Father     Coronary artery disease Father     Colon cancer Family     Other Family         headaches    Skin cancer Family         Meds/Allergies   Allergies   Allergen Reactions    Bee Pollen     Cat Hair Extract     Pollen Extract        Current Facility-Administered Medications:     ceFAZolin (ANCEF) IVPB (premix in dextrose) 2,000 mg 50 mL, 2,000 mg, Intravenous, Once, Sukhdev Medina MD    lactated ringers infusion, 125 mL/hr, Intravenous, Continuous, Yovani Dominguez DO    lactated ringers infusion, 125 mL/hr, Intravenous, Continuous, Sukhdev Medina MD, Last Rate: 125 mL/hr at 04/18/24 1340, 125 mL/hr at 04/18/24 1340    Review of Systems    Vitals:    04/18/24 1340   BP: 126/66   Pulse: 66   Resp: 16   Temp: 98.3 °F (36.8 °C)   SpO2: 93%     Physical Exam  GEN: NAD, A+OX3   HEENT: Normocephalic, atraumatic,   NECK: Supple, trachea midline,   CARDIAC: regular rate & rhythm, S1 & S2 normal.   LUNGS: Clear to auscultation, No Wheeze, Rales, or Rhonchi  ABDOMEN: Bilateral inguinal hernias.  Umbilical hernia.  EXTREMITIES: No evidence of cyanosis, clubbing or edema. Pulses +2 B/L LE  NEURO: CN II-XII intact grossly, No sensory or motor deficits    Lab Results: I have personally reviewed pertinent lab results.    Imaging:   EKG, Pathology, and Other Studies:   Lab Results   Component Value Date    CALCIUM 9.3 09/21/2023    K 4.0 09/21/2023    CO2 32 09/21/2023     09/21/2023    BUN 28 (H) 09/21/2023    CREATININE 1.05 09/21/2023     Lab Results   Component Value  Date    WBC 7.63 03/28/2024    HGB 15.5 03/28/2024    HCT 48.5 03/28/2024    MCV 97 03/28/2024     03/28/2024     Lab Results   Component Value Date    ALT 23 09/21/2023    AST 22 09/21/2023    ALKPHOS 56 09/21/2023

## 2024-04-18 NOTE — ANESTHESIA POSTPROCEDURE EVALUATION
Post-Op Assessment Note    CV Status:  Stable  Pain Score: 1    Pain management: adequate       Mental Status:  Alert and awake   Hydration Status:  Euvolemic   PONV Controlled:  Controlled   Airway Patency:  Patent     Post Op Vitals Reviewed: Yes    No anethesia notable event occurred.    Staff: Anesthesiologist           /56 (04/18/24 1815)    Temp      Pulse 70 (04/18/24 1815)   Resp 16 (04/18/24 1815)    SpO2 92 % (04/18/24 1815)

## 2024-04-18 NOTE — DISCHARGE INSTR - AVS FIRST PAGE
[facility]’s General Surgery [facility]     Post-Operative Care Instructions     Dr. Sukhdev Medina MD, [facility]     [phone]          1. General: You will feel pulling sensations around the wound or funny aches and pains around the incisions. This is normal. Even minor surgery is a change in your body and this is your body’s way of reacting to it. If you have had abdominal surgery, it may help to support the incision with a small pillow or blanket for comfort when moving or coughing.     2. Wound care:  Okay to shower.  The glue will fall off over the next week or 2.   Use ice for at least the 1st 48 hours.  Do not use for longer than 20 minutes at a time. Use 3 times per day.     3. Water: You may shower over the wounds. Do not bathe or use a pool or hot tub until cleared by the physician.   If you were discharged with a drain, make sure drain site is covered with plastic wrap before showering.      4. Activity: You may go up and down stairs, walk as much as you are comfortable, but walk at least 3 times each day. If you have had abdominal surgery, do not lift anything heavier than 20 pounds for at least 4 weeks.      5. Diet: You may resume a regular diet. If you had a same-day surgery or overnight stay surgery, you may wish to eat lightly for a few days: soups, crackers, and sandwiches. You may resume a regular diet when ready.      6. Medications: Resume all of your previous medications, unless told otherwise by the doctor. Avoid aspirin products for 2-3 days after the date of surgery. You may, at that time, began to take them again. Use Tylenol and Ibuprofen for pain control.  You may alternate these medications every 3 hours.  For example: you may take Tylenol at noon, Ibuprofen at 3:00 p.m., and Tylenol again at 6:00 p.m., etc. You should use ice to assist with pain control as above.  You do not need to take narcotic pain medication unless you are having significant pain.   If you were prescribed a  narcotic pain medication containing Tylenol, such as Percocet or Norco, do not use supplemental Tylenol.      7. Driving: You will need someone to drive you home on the day of surgery or discharge. Do not drive or make any important decisions while on narcotic pain medication or 24 hours and after anesthesia or sedation for surgery. Generally, you may drive when your off all narcotic pain medications and you are comfortable.      8. Upset Stomach: You may take Maalox, Tums, or similar items for an upset stomach. If your narcotic pain medication causes an upset stomach, do not take it on an empty stomach. Try taking it with at least some crackers or toast.      9. Constipation: Patients often experience constipation after surgery. You may take over-the-counter medication for this, such as Metamucil, Senokot, Dulcolax, milk of magnesia, etc. You may take a suppository unless you have had anorectal surgery such as a procedure on your hemorrhoids. If you experience significant nausea or vomiting after abdominal surgery, call the office before trying any of these medications.     10. Call the office: If you are experiencing any of the following: fevers above 101.5°, significant nausea or vomiting, if the wound develops drainage and/or there is excessive redness around the wound, or if you have significant diarrhea or other worsening symptoms.     11. Pain: You may be given a prescription for pain medication.  This will be sent to your pharmacy prior to discharge.     12. Sexual Activity: You may resume sexual activity when you feel ready and comfortable and your incision is sealed and healed without apparent infection risk.     13. Urination: If you have not urinated in 6 hours, go directly to the ER for evaluation for urinary retention.      14. Follow-up in 2 weeks.          **READ ONLY IF YOU HAVE BEEN DISCHARGED WITH A URINARY CATHETER**    Myers Insertion for Post-Op Urinary Retention        - A prescription for  Flomax will be sent to your pharmacy.  This should be taken daily while the urinary catheter remains in place.    You will not be given a prescription for Flomax if your prostate has been removed.  If you are already taking Flomax, continue the medication as prescribed.     - We will send a message to the urology group who will contact you within the next 48 hours with further instructions and to schedule an appointment for voiding trial and catheter removal.  The urinary catheter will remain in place for approximately 1 week.  If you are not contacted within the next 48 hours please call our office to assist with scheduling your follow-up.     - If you have your own urologist, you should contact your physician the day after discharge for instructions and to schedule a voiding trial and catheter removal.

## 2024-04-18 NOTE — ANESTHESIA PREPROCEDURE EVALUATION
Procedure:  REPAIR HERNIA INGUINAL  W/ ROBOTICS (Bilateral: Groin)  REPAIR HERNIA UMBILICAL (Abdomen)    Relevant Problems   CARDIO   (+) Hyperlipidemia   (+) Migraine headache      GI/HEPATIC   (+) GERD (gastroesophageal reflux disease)      MUSCULOSKELETAL   (+) Acute left-sided low back pain without sciatica   (+) Lumbar spondylosis   (+) Primary osteoarthritis      NEURO/PSYCH   (+) Chronic pain syndrome   (+) Migraine headache      PULMONARY   (+) Asthma      Orthopedic/Musculoskeletal   (+) Lumbar radiculopathy      Dermatology   (+) Eczema   (+) Seborrheic keratosis      Other   (+) History of DVT (deep vein thrombosis) (Following knee surgery)   (+) Umbilical hernia without obstruction and without gangrene        Physical Exam    Airway    Mallampati score: II  TM Distance: >3 FB       Dental   Comment: #9, 10 implants, molar caps     Cardiovascular  Rhythm: regular, Rate: normal    Pulmonary   Breath sounds clear to auscultation    Other Findings  Developing mild rash with redness on torso and arms from CHG wipes      Anesthesia Plan  ASA Score- 2     Anesthesia Type- general with ASA Monitors.         Additional Monitors:     Airway Plan: ETT.    Comment: Discussed TAPs if open,  Icthing some redness from wipes noted, will provide beandryl if needed.       Plan Factors-Exercise tolerance (METS): >4 METS.    Chart reviewed.   Existing labs reviewed.     Patient is not a current smoker.      Obstructive sleep apnea risk education given perioperatively.        Induction- intravenous.    Postoperative Plan- Plan for postoperative opioid use.     Informed Consent- Anesthetic plan and risks discussed with patient.

## 2024-04-23 PROCEDURE — 88302 TISSUE EXAM BY PATHOLOGIST: CPT | Performed by: PATHOLOGY

## 2024-05-01 ENCOUNTER — OFFICE VISIT (OUTPATIENT)
Dept: SURGERY | Facility: CLINIC | Age: 64
End: 2024-05-01

## 2024-05-01 VITALS
SYSTOLIC BLOOD PRESSURE: 134 MMHG | WEIGHT: 221 LBS | BODY MASS INDEX: 29.29 KG/M2 | RESPIRATION RATE: 16 BRPM | OXYGEN SATURATION: 97 % | TEMPERATURE: 98.4 F | DIASTOLIC BLOOD PRESSURE: 78 MMHG | HEART RATE: 77 BPM | HEIGHT: 73 IN

## 2024-05-01 DIAGNOSIS — Z98.890 STATUS POST LAPAROSCOPIC HERNIA REPAIR: Primary | ICD-10-CM

## 2024-05-01 DIAGNOSIS — Z87.19 STATUS POST LAPAROSCOPIC HERNIA REPAIR: Primary | ICD-10-CM

## 2024-05-01 PROCEDURE — 99024 POSTOP FOLLOW-UP VISIT: CPT | Performed by: SURGERY

## 2024-05-01 NOTE — ASSESSMENT & PLAN NOTE
It is now been 2 weeks since his surgery.  He underwent robotic bilateral inguinal hernias with mesh and a primary umbilical hernia repair.  He has 2 weeks of lifting restrictions and return to normal activities.  He can follow-up as needed.    Unfortunately he did have some right lower quadrant pain that was similar to previous to his repairs.  I explained that if these pains do return then they may not been related to his hernia and we can undergo further investigation.  However this time recommend observation and things should settle down with time.

## 2024-05-01 NOTE — PROGRESS NOTES
Assessment/Plan:    Status post laparoscopic hernia repair  It is now been 2 weeks since his surgery.  He underwent robotic bilateral inguinal hernias with mesh and a primary umbilical hernia repair.  He has 2 weeks of lifting restrictions and return to normal activities.  He can follow-up as needed.    Unfortunately he did have some right lower quadrant pain that was similar to previous to his repairs.  I explained that if these pains do return then they may not been related to his hernia and we can undergo further investigation.  However this time recommend observation and things should settle down with time.           Diagnoses and all orders for this visit:    Status post laparoscopic hernia repair              Subjective:      Patient ID: Naldo Fox is a 63 y.o. male.    Mr. Fox is a 63 old gentleman here for evaluation of a right inguinal hernia.  He states he noticed some point in the right groin and brought attention of his primary care physician who diagnosed with a right inguinal hernia and sent him for evaluation.  He has some straining or some fullness in this area with straining.  He denies nausea vomiting fevers or chills.    2/21/2024 he returns now for reevaluation.  He overall is asymptomatic but occasionally has some twinges in the groins.  Denies nausea vomiting fevers or chills.    5/1/2024.  He is now 2-week status post robotic repair of bilateral inguinal hernias and primary umbilical hernia repair.  Overall doing well.  Had some swelling in the scrotum or testicles that is improving.        The following portions of the patient's history were reviewed and updated as appropriate: allergies, current medications, past family history, past medical history, past social history, past surgical history, and problem list.    Review of Systems      Objective:      /78 (BP Location: Left arm, Patient Position: Sitting, Cuff Size: Large)   Pulse 77   Temp 98.4 °F (36.9 °C) (Tympanic)    "Resp 16   Ht 6' 1\" (1.854 m)   Wt 100 kg (221 lb)   SpO2 97%   BMI 29.16 kg/m²          Physical Exam  Pulmonary:      Comments: Incisions healing well.  No evidence of recurrent hernias          "

## 2024-06-25 ENCOUNTER — TELEPHONE (OUTPATIENT)
Age: 64
End: 2024-06-25

## 2024-06-25 NOTE — TELEPHONE ENCOUNTER
Patient called to confirm appointment on 7/15/24 is for his pre op clearance and if he needed lab work. Confirmed 7/15/24 appointment is for pre op; advised that lab work would come from surgeon's office. He will contact surgeon's office regarding lab work.

## 2024-07-15 ENCOUNTER — CONSULT (OUTPATIENT)
Dept: FAMILY MEDICINE CLINIC | Facility: CLINIC | Age: 64
End: 2024-07-15
Payer: COMMERCIAL

## 2024-07-15 VITALS
HEIGHT: 73 IN | SYSTOLIC BLOOD PRESSURE: 130 MMHG | DIASTOLIC BLOOD PRESSURE: 68 MMHG | HEART RATE: 71 BPM | OXYGEN SATURATION: 97 % | BODY MASS INDEX: 28.31 KG/M2 | WEIGHT: 213.6 LBS | TEMPERATURE: 98.1 F

## 2024-07-15 DIAGNOSIS — K21.9 GASTROESOPHAGEAL REFLUX DISEASE WITHOUT ESOPHAGITIS: ICD-10-CM

## 2024-07-15 DIAGNOSIS — Z86.718 HISTORY OF DVT (DEEP VEIN THROMBOSIS): ICD-10-CM

## 2024-07-15 DIAGNOSIS — K42.9 UMBILICAL HERNIA WITHOUT OBSTRUCTION AND WITHOUT GANGRENE: ICD-10-CM

## 2024-07-15 DIAGNOSIS — J45.20 MILD INTERMITTENT ASTHMA WITHOUT COMPLICATION: ICD-10-CM

## 2024-07-15 DIAGNOSIS — Z01.818 PRE-OP EXAMINATION: Primary | ICD-10-CM

## 2024-07-15 DIAGNOSIS — E78.2 MIXED HYPERLIPIDEMIA: ICD-10-CM

## 2024-07-15 DIAGNOSIS — J30.9 ALLERGIC RHINITIS, UNSPECIFIED SEASONALITY, UNSPECIFIED TRIGGER: ICD-10-CM

## 2024-07-15 DIAGNOSIS — M17.0 PRIMARY OSTEOARTHRITIS OF BOTH KNEES: ICD-10-CM

## 2024-07-15 DIAGNOSIS — K40.90 NON-RECURRENT UNILATERAL INGUINAL HERNIA WITHOUT OBSTRUCTION OR GANGRENE: ICD-10-CM

## 2024-07-15 PROBLEM — K40.20 NON-RECURRENT BILATERAL INGUINAL HERNIA WITHOUT OBSTRUCTION OR GANGRENE: Status: RESOLVED | Noted: 2023-11-27 | Resolved: 2024-07-15

## 2024-07-15 PROCEDURE — 93000 ELECTROCARDIOGRAM COMPLETE: CPT | Performed by: FAMILY MEDICINE

## 2024-07-15 PROCEDURE — 99214 OFFICE O/P EST MOD 30 MIN: CPT | Performed by: FAMILY MEDICINE

## 2024-07-15 NOTE — LETTER
July 15, 2024     Sean Beasley MD  [address] [facility] [address]    Patient: Naldo Fox   YOB: 1960   Date of Visit: 7/15/2024       Dear Dr. Beasley:    Thank you for referring Naldo Fox to me for evaluation. Below are my notes for this consultation.    If you have questions, please do not hesitate to call me. I look forward to following your patient along with you.         Sincerely,        Deshawn Vamra MD        CC: No Recipients    Deshawn Varma MD  7/15/2024 11:01 AM  Sign when Signing Visit  Presurgical Evaluation    Subjective:     Patient ID: Naldo Fox is a 64 y.o. male.    Chief Complaint   Patient presents with   • Pre-op Exam     Pre op clearance knee replacement (left) on 8/6/24 and ekg       Patient is here today because he needs a preop for left knee replacement.  Has been to orthopedics.  Has significant problems with the left knee, but as well the right knee.  Left seems to be the worst.  Causing some problems with walking.    Patient recently had umbilical and inguinal hernia repair, tolerated quite well.      Asthma: History of asthma, has not needed an inhaler in many years.  No specific issues or problems at the moment.    Hyperlipidemia: Stable.  No particular issues or problems.  On simvastatin.    History of DVT: Post prior knee surgery, the patient did have DVT.  No long-term problems.  Was on warfarin at that point.    GERD: Stable.  Not really having issues or problems at the moment.    Seasonal allergies: Uses loratadine D.        The following portions of the patient's history were reviewed and updated as appropriate: allergies, current medications, past family history, past medical history, past social history, past surgical history, and problem list.    Procedure date: 6 August 2024    Surgeon: Dr. Beasley  Planned procedure: Left knee replacement  Diagnosis for procedure: Osteoarthritis    Prior anesthesia: Yes    General; Complications:  None / Tolerated well  MAC; Complications:  None / Tolerated well    CAD History: None   NOTE: Patient should see Cardiology if time available before surgery, and if appropriate.    Pulmonary History: Asthma    Renal history: None    Diabetes History:  None     Neurological History: None     On Immunosuppressant meds/biologics: No      Review of Systems   Constitutional: Negative.    HENT: Negative.     Eyes: Negative.    Respiratory: Negative.     Cardiovascular: Negative.    Gastrointestinal: Negative.    Endocrine: Negative.    Genitourinary: Negative.    Musculoskeletal:  Positive for arthralgias and gait problem.   Skin: Negative.    Allergic/Immunologic: Negative.    Hematological: Negative.    Psychiatric/Behavioral: Negative.          Current Outpatient Medications   Medication Sig Dispense Refill   • acetaminophen (TYLENOL) 650 mg CR tablet Take 650 mg by mouth every 8 (eight) hours as needed for mild pain     • albuterol (PROVENTIL HFA,VENTOLIN HFA) 90 mcg/act inhaler Inhale 2 puffs every 4 (four) hours as needed for wheezing or shortness of breath 18 g 0   • gabapentin (NEURONTIN) 300 mg capsule TAKE 1 CAPSULE BY MOUTH 3 TIMES A  capsule 1   • Ibuprofen 200 MG CAPS Take by mouth 600mg Q AM     • loratadine-pseudoephedrine (Wal-itin D 24 Hour)  mg per 24 hr tablet Take 1 tablet by mouth daily 90 tablet 1   • Multiple Vitamin (MULTI-VITAMIN DAILY PO) Take by mouth     • sildenafil (Viagra) 100 mg tablet Take as directed. 10 tablet    • simvastatin (ZOCOR) 40 mg tablet Take 1 tablet (40 mg total) by mouth daily 90 tablet 1   • ciprofloxacin-dexamethasone (CIPRODEX) otic suspension Administer 4 drops to the right ear 2 (two) times a day for 7 days (Patient not taking: Reported on 5/1/2024) 7.5 mL 0     No current facility-administered medications for this visit.       Allergies on file:   Bee pollen, Cat hair extract, and Pollen extract    Patient Active Problem List    Diagnosis   • Allergic rhinitis   • Asthma   • Basal cell carcinoma of skin of trunk, except scrotum   • GERD (gastroesophageal reflux disease)   • Erectile dysfunction of non-organic origin   • Hyperlipidemia   • Primary osteoarthritis   • Migraine headache   • Eczema   • Seborrheic keratosis   • Solar degeneration   • Acute left-sided low back pain without sciatica   • Foraminal stenosis due to intervertebral disc disease   • Lumbar disc herniation   • Lumbar foraminal stenosis   • Lumbar radiculopathy   • Lumbar spondylosis   • Chronic pain syndrome   • Family history of colon cancer   • History of DVT (deep vein thrombosis)   • Status post laparoscopic hernia repair        Past Medical History:   Diagnosis Date   • Allergic    • Arthritis    • Asthma    • Colon polyp    • Diverticulosis    • DVT (deep venous thrombosis) (HCC)     after knee sx   • Family hx colonic polyps     mother   • Family hx of colon cancer     maternal Aunt, Uncle   • Hyperlipidemia    • Inguinal hernia 10/02/2023   • Non-recurrent bilateral inguinal hernia without obstruction or gangrene 11/27/2023   • Osteoarthritis    • Peyronie disease    • Umbilical hernia without obstruction and without gangrene 02/21/2024       Past Surgical History:   Procedure Laterality Date   • COLONOSCOPY     • KNEE ARTHROSCOPY     • KNEE SURGERY Left    • ORTHOPEDIC SURGERY     • CA ESOPHAGOGASTRODUODENOSCOPY TRANSORAL DIAGNOSTIC N/A 07/05/2017    Procedure: ESOPHAGOGASTRODUODENOSCOPY (EGD);  Surgeon: Emmanuel Payne MD;  Location: [facility];  Service: Gastroenterology   • CA LAPAROSCOPY SURG RPR INITIAL INGUINAL HERNIA Bilateral 4/18/2024    Procedure: REPAIR HERNIA INGUINAL  W/ ROBOTICS WITH MESH;  Surgeon: Sukhdev Medina MD;  Location: [facility];  Service: General   • CA RPR AA HERNIA 1ST < 3 CM REDUCIBLE N/A 4/18/2024    Procedure: REPAIR HERNIA UMBILICAL;  Surgeon: Sukhdev Medina MD;  Location: [facility];  Service: General   • SHOULDER SURGERY    "      Family History   Problem Relation Age of Onset   • Other Mother         headaches   • Lung cancer Mother    • Cancer Mother    • Lymphoma Father         acute   • Heart attack Father    • Esophageal cancer Father    • Cancer Father    • Coronary artery disease Father    • Heart disease Father    • Colon cancer Family    • Other Family         headaches   • Skin cancer Family        Social History     Tobacco Use   • Smoking status: Never     Passive exposure: Never   • Smokeless tobacco: Never   Vaping Use   • Vaping status: Never Used   Substance Use Topics   • Alcohol use: Yes     Alcohol/week: 1.0 standard drink of alcohol     Types: 1 Cans of beer per week     Comment: Occasionally will have a beer. Normally weeks between   • Drug use: No       Objective:    Vitals:    07/15/24 1033   BP: 130/68   BP Location: Left arm   Patient Position: Sitting   Cuff Size: Adult   Pulse: 71   Temp: 98.1 °F (36.7 °C)   TempSrc: Tympanic   SpO2: 97%   Weight: 96.9 kg (213 lb 9.6 oz)   Height: 6' 1\" (1.854 m)       Physical Exam  Vitals and nursing note reviewed.   Constitutional:       General: He is not in acute distress.     Appearance: He is well-developed. He is not diaphoretic.   HENT:      Head: Normocephalic and atraumatic.      Right Ear: Hearing, tympanic membrane, ear canal and external ear normal.      Left Ear: Hearing, tympanic membrane, ear canal and external ear normal.      Nose: Nose normal.      Right Sinus: No maxillary sinus tenderness or frontal sinus tenderness.      Left Sinus: No maxillary sinus tenderness or frontal sinus tenderness.      Mouth/Throat:      Pharynx: Uvula midline. No oropharyngeal exudate.   Eyes:      General: Lids are everted, no foreign bodies appreciated.      Conjunctiva/sclera: Conjunctivae normal.      Pupils: Pupils are equal, round, and reactive to light.   Neck:      Thyroid: No thyromegaly.      Vascular: No carotid bruit.      Trachea: No tracheal deviation. "   Cardiovascular:      Rate and Rhythm: Normal rate and regular rhythm.      Pulses:           Carotid pulses are 2+ on the right side and 2+ on the left side.     Heart sounds: Normal heart sounds. No murmur heard.     No friction rub. No gallop.   Pulmonary:      Effort: Pulmonary effort is normal. No respiratory distress.      Breath sounds: Normal breath sounds. No wheezing or rales.   Abdominal:      General: Bowel sounds are normal. There is no distension.      Palpations: Abdomen is soft.      Tenderness: There is no abdominal tenderness.   Musculoskeletal:      Cervical back: Normal range of motion and neck supple.   Lymphadenopathy:      Cervical: No cervical adenopathy.   Skin:     General: Skin is warm and dry.   Neurological:      Mental Status: He is alert and oriented to person, place, and time.      Coordination: Coordination normal.   Psychiatric:         Behavior: Behavior normal.         Thought Content: Thought content normal.         Judgment: Judgment normal.          Preop labs/testing available and reviewed: yes    eGFRcr   Date Value Ref Range Status   07/13/2024 92 >59 Final        INR   Date Value Ref Range Status   07/13/2024 1.1  Final     Comment:     Interpretation  Suggested INR ranges for various  clinical conditions:                                           INR  Ambulatory Surgery          <1.5  Coumadinized Patients             (DVT,PE,MI or A.Fib)     2.0-3.0  Mechanical Heart Valve   2.5-3.5  Cardiogenic Embolus      2.5-3.5       EKG yes: Normal sinus rhythm, no abnormalities.    Echo no    Stress test/cath no    PFT/Steven no    Functional capacity: Shovel snow                          6 Mets   Pick the highest level patient can comfortably perform   4 mets or greater for surgery    RCRI  High Risk surgery?         1 Point  CAD History:         1 Point   MI; Positive Stress Test; CP due to Mi;  Nitrate Usage to control Angina; Pathologic Q wave on EKG  CHF Active:         1  Point   Pulm Edema; Paroxysmal Nocturnal Dyspnea;  Bibasilar Rales (crackles);S3; CHF on CXR  Cerebrovascular Disease (TIA or CVA):     1 Point  DM on Insulin:        1 Point  Serum Creat >2.0 mg/dl:       1 Point          Total Points: 0     Scorin: Class I, Very Low Risk (0.4%)     1: Class II, Low risk (0.9%)     2: Class III Moderate (6.6%)     3: Class IV High (>11%)      JERALD Risk:  GFR:   eGFRcr   Date Value Ref Range Status   2024 92 >59 Final         For PCP:  If GFR>60, Hold ACE/ARB/Diuretic on the day of surgery, and NSAIDS 10 days before.    If GFR<45, Consider PRE and POST op Nephrology Consult.    If 46 <GFR> 59 : Has Patient had JERALD in last 6 Months? no   If YES: Preop Nephrology consult   If No:  Post Op Nephrology consult.           Assessment/Plan:    Patient is medically optimized (cleared) for the planned procedure.    Further testing/evaluation is not required.    Postop concerns: no    1. Pre-op examination  Comments:  Low risk for the planned surgery.  May proceed at this point.  Orders:  -     POCT ECG  2. Primary osteoarthritis of both knees  Assessment & Plan:  Patient appears at low risk for the planned left knee replacement.  May proceed at this time.  3. Mixed hyperlipidemia  Assessment & Plan:  Cholesterol stable from before.  On simvastatin.  Check as scheduled.  4. Mild intermittent asthma without complication  Assessment & Plan:  Lungs clear today.  Doing quite well as far as asthma is concerned.  Really does not have any specific issues or problems.  May need albuterol perioperatively if he has any wheezing.  Otherwise, should not be an issue.  Did not have any problems with his recent hernia repairs.  5. Gastroesophageal reflux disease without esophagitis  Assessment & Plan:  Stable.  Not on medications.  No changes.  6. History of DVT (deep vein thrombosis)  Assessment & Plan:  Likely full anticoagulation postop.  Patient has discussed this previously with  "orthopedics.  Reviewed about novel oral anticoagulants such as Xarelto, Eliquis, Pradaxa.  Again, defer to orthopedics.  7. Umbilical hernia without obstruction and without gangrene  Assessment & Plan:  Surgically repaired recently.  Doing quite well.  Follow-up with general surgery if needed.  8. Allergic rhinitis, unspecified seasonality, unspecified trigger  Assessment & Plan:  Recommend avoid Claritin-D about 3 days prior to surgery due to the ED potentially changing heart rate and blood pressure.  9. Non-recurrent unilateral inguinal hernia without obstruction or gangrene  Assessment & Plan:  Postrepair.  Doing quite well at this point.  Follow-up as needed.             Pre-Surgery Instructions:   Medication Instructions   • acetaminophen (TYLENOL) 650 mg CR tablet per anesthesia guidelines    • albuterol (PROVENTIL HFA,VENTOLIN HFA) 90 mcg/act inhaler per anesthesia guidelines    • gabapentin (NEURONTIN) 300 mg capsule per anesthesia guidelines    • Ibuprofen 200 MG CAPS Stop taking 1 week prior to surgery   • loratadine-pseudoephedrine (Wal-itin D 24 Hour)  mg per 24 hr tablet Stop taking 3 days prior to surgery   • Multiple Vitamin (MULTI-VITAMIN DAILY PO) Stop taking 1 week prior to surgery   • sildenafil (Viagra) 100 mg tablet Stop taking 3 days prior to surgery   • simvastatin (ZOCOR) 40 mg tablet per anesthesia guidelines         NOTE: Please use the above to review important meds for your specialty, the remainder \"per anesthesia Guidelines.\"    NOTE: Please place an Inbasket message for \"SOC\" pool for complicated patients.     "

## 2024-07-15 NOTE — PATIENT INSTRUCTIONS
Pre-Surgery Instructions:   Medication Instructions    acetaminophen (TYLENOL) 650 mg CR tablet per anesthesia guidelines     albuterol (PROVENTIL HFA,VENTOLIN HFA) 90 mcg/act inhaler per anesthesia guidelines     gabapentin (NEURONTIN) 300 mg capsule per anesthesia guidelines     Ibuprofen 200 MG CAPS Stop taking 1 week prior to surgery    loratadine-pseudoephedrine (Wal-itin D 24 Hour)  mg per 24 hr tablet Stop taking 3 days prior to surgery    Multiple Vitamin (MULTI-VITAMIN DAILY PO) Stop taking 1 week prior to surgery    sildenafil (Viagra) 100 mg tablet Stop taking 3 days prior to surgery    simvastatin (ZOCOR) 40 mg tablet per anesthesia guidelines    1. Pre-op examination  Comments:  Low risk for the planned surgery.  May proceed at this point.  Orders:  -     POCT ECG  2. Primary osteoarthritis of both knees  Assessment & Plan:  Patient appears at low risk for the planned left knee replacement.  May proceed at this time.  3. Mixed hyperlipidemia  Assessment & Plan:  Cholesterol stable from before.  On simvastatin.  Check as scheduled.  4. Mild intermittent asthma without complication  Assessment & Plan:  Lungs clear today.  Doing quite well as far as asthma is concerned.  Really does not have any specific issues or problems.  May need albuterol perioperatively if he has any wheezing.  Otherwise, should not be an issue.  Did not have any problems with his recent hernia repairs.  5. Gastroesophageal reflux disease without esophagitis  Assessment & Plan:  Stable.  Not on medications.  No changes.  6. History of DVT (deep vein thrombosis)  Assessment & Plan:  Likely full anticoagulation postop.  Patient has discussed this previously with orthopedics.  Reviewed about novel oral anticoagulants such as Xarelto, Eliquis, Pradaxa.  Again, defer to orthopedics.  7. Umbilical hernia without obstruction and without gangrene  Assessment & Plan:  Surgically repaired recently.  Doing quite well.  Follow-up with  general surgery if needed.  8. Allergic rhinitis, unspecified seasonality, unspecified trigger  Assessment & Plan:  Recommend avoid Claritin-D about 3 days prior to surgery due to the ED potentially changing heart rate and blood pressure.  9. Non-recurrent unilateral inguinal hernia without obstruction or gangrene  Assessment & Plan:  Postrepair.  Doing quite well at this point.  Follow-up as needed.

## 2024-07-15 NOTE — ASSESSMENT & PLAN NOTE
Recommend avoid Claritin-D about 3 days prior to surgery due to the ED potentially changing heart rate and blood pressure.

## 2024-07-15 NOTE — ASSESSMENT & PLAN NOTE
Likely full anticoagulation postop.  Patient has discussed this previously with orthopedics.  Reviewed about novel oral anticoagulants such as Xarelto, Eliquis, Pradaxa.  Again, defer to orthopedics.

## 2024-07-15 NOTE — PROGRESS NOTES
Presurgical Evaluation    Subjective:      Patient ID: Naldo Fox is a 64 y.o. male.    Chief Complaint   Patient presents with   • Pre-op Exam     Pre op clearance knee replacement (left) on 8/6/24 and ekg       Patient is here today because he needs a preop for left knee replacement.  Has been to orthopedics.  Has significant problems with the left knee, but as well the right knee.  Left seems to be the worst.  Causing some problems with walking.    Patient recently had umbilical and inguinal hernia repair, tolerated quite well.      Asthma: History of asthma, has not needed an inhaler in many years.  No specific issues or problems at the moment.    Hyperlipidemia: Stable.  No particular issues or problems.  On simvastatin.    History of DVT: Post prior knee surgery, the patient did have DVT.  No long-term problems.  Was on warfarin at that point.    GERD: Stable.  Not really having issues or problems at the moment.    Seasonal allergies: Uses loratadine D.        The following portions of the patient's history were reviewed and updated as appropriate: allergies, current medications, past family history, past medical history, past social history, past surgical history, and problem list.    Procedure date: 6 August 2024    Surgeon: Dr. Beasley  Planned procedure: Left knee replacement  Diagnosis for procedure: Osteoarthritis    Prior anesthesia: Yes   General; Complications:  None / Tolerated well  MAC; Complications:  None / Tolerated well    CAD History: None   NOTE: Patient should see Cardiology if time available before surgery, and if appropriate.    Pulmonary History: Asthma    Renal history: None    Diabetes History:  None     Neurological History: None     On Immunosuppressant meds/biologics: No      Review of Systems   Constitutional: Negative.    HENT: Negative.     Eyes: Negative.    Respiratory: Negative.     Cardiovascular: Negative.    Gastrointestinal: Negative.    Endocrine: Negative.     Genitourinary: Negative.    Musculoskeletal:  Positive for arthralgias and gait problem.   Skin: Negative.    Allergic/Immunologic: Negative.    Hematological: Negative.    Psychiatric/Behavioral: Negative.           Current Outpatient Medications   Medication Sig Dispense Refill   • acetaminophen (TYLENOL) 650 mg CR tablet Take 650 mg by mouth every 8 (eight) hours as needed for mild pain     • albuterol (PROVENTIL HFA,VENTOLIN HFA) 90 mcg/act inhaler Inhale 2 puffs every 4 (four) hours as needed for wheezing or shortness of breath 18 g 0   • gabapentin (NEURONTIN) 300 mg capsule TAKE 1 CAPSULE BY MOUTH 3 TIMES A  capsule 1   • Ibuprofen 200 MG CAPS Take by mouth 600mg Q AM     • loratadine-pseudoephedrine (Wal-itin D 24 Hour)  mg per 24 hr tablet Take 1 tablet by mouth daily 90 tablet 1   • Multiple Vitamin (MULTI-VITAMIN DAILY PO) Take by mouth     • sildenafil (Viagra) 100 mg tablet Take as directed. 10 tablet    • simvastatin (ZOCOR) 40 mg tablet Take 1 tablet (40 mg total) by mouth daily 90 tablet 1   • ciprofloxacin-dexamethasone (CIPRODEX) otic suspension Administer 4 drops to the right ear 2 (two) times a day for 7 days (Patient not taking: Reported on 5/1/2024) 7.5 mL 0     No current facility-administered medications for this visit.       Allergies on file:   Bee pollen, Cat hair extract, and Pollen extract    Patient Active Problem List   Diagnosis   • Allergic rhinitis   • Asthma   • Basal cell carcinoma of skin of trunk, except scrotum   • GERD (gastroesophageal reflux disease)   • Erectile dysfunction of non-organic origin   • Hyperlipidemia   • Primary osteoarthritis   • Migraine headache   • Eczema   • Seborrheic keratosis   • Solar degeneration   • Acute left-sided low back pain without sciatica   • Foraminal stenosis due to intervertebral disc disease   • Lumbar disc herniation   • Lumbar foraminal stenosis   • Lumbar radiculopathy   • Lumbar spondylosis   • Chronic pain syndrome    • Family history of colon cancer   • History of DVT (deep vein thrombosis)   • Status post laparoscopic hernia repair        Past Medical History:   Diagnosis Date   • Allergic    • Arthritis    • Asthma    • Colon polyp    • Diverticulosis    • DVT (deep venous thrombosis) (HCC)     after knee sx   • Family hx colonic polyps     mother   • Family hx of colon cancer     maternal Aunt, Uncle   • Hyperlipidemia    • Inguinal hernia 10/02/2023   • Non-recurrent bilateral inguinal hernia without obstruction or gangrene 11/27/2023   • Osteoarthritis    • Peyronie disease    • Umbilical hernia without obstruction and without gangrene 02/21/2024       Past Surgical History:   Procedure Laterality Date   • COLONOSCOPY     • KNEE ARTHROSCOPY     • KNEE SURGERY Left    • ORTHOPEDIC SURGERY     • WA ESOPHAGOGASTRODUODENOSCOPY TRANSORAL DIAGNOSTIC N/A 07/05/2017    Procedure: ESOPHAGOGASTRODUODENOSCOPY (EGD);  Surgeon: Emmanuel Payne MD;  Location: [facility] GI [facility];  Service: Gastroenterology   • WA LAPAROSCOPY SURG RPR INITIAL INGUINAL HERNIA Bilateral 4/18/2024    Procedure: REPAIR HERNIA INGUINAL  W/ ROBOTICS WITH MESH;  Surgeon: Sukhdev Medina MD;  Location: [facility];  Service: General   • WA RPR AA HERNIA 1ST < 3 CM REDUCIBLE N/A 4/18/2024    Procedure: REPAIR HERNIA UMBILICAL;  Surgeon: Sukhdev Medina MD;  Location[facility];  Service: General   • SHOULDER SURGERY         Family History   Problem Relation Age of Onset   • Other Mother         headaches   • Lung cancer Mother    • Cancer Mother    • Lymphoma Father         acute   • Heart attack Father    • Esophageal cancer Father    • Cancer Father    • Coronary artery disease Father    • Heart disease Father    • Colon cancer Family    • Other Family         headaches   • Skin cancer Family        Social History     Tobacco Use   • Smoking status: Never     Passive exposure: Never   • Smokeless tobacco: Never   Vaping Use   • Vaping status: Never Used   Substance  "Use Topics   • Alcohol use: Yes     Alcohol/week: 1.0 standard drink of alcohol     Types: 1 Cans of beer per week     Comment: Occasionally will have a beer. Normally weeks between   • Drug use: No       Objective:    Vitals:    07/15/24 1033   BP: 130/68   BP Location: Left arm   Patient Position: Sitting   Cuff Size: Adult   Pulse: 71   Temp: 98.1 °F (36.7 °C)   TempSrc: Tympanic   SpO2: 97%   Weight: 96.9 kg (213 lb 9.6 oz)   Height: 6' 1\" (1.854 m)        Physical Exam  Vitals and nursing note reviewed.   Constitutional:       General: He is not in acute distress.     Appearance: He is well-developed. He is not diaphoretic.   HENT:      Head: Normocephalic and atraumatic.      Right Ear: Hearing, tympanic membrane, ear canal and external ear normal.      Left Ear: Hearing, tympanic membrane, ear canal and external ear normal.      Nose: Nose normal.      Right Sinus: No maxillary sinus tenderness or frontal sinus tenderness.      Left Sinus: No maxillary sinus tenderness or frontal sinus tenderness.      Mouth/Throat:      Pharynx: Uvula midline. No oropharyngeal exudate.   Eyes:      General: Lids are everted, no foreign bodies appreciated.      Conjunctiva/sclera: Conjunctivae normal.      Pupils: Pupils are equal, round, and reactive to light.   Neck:      Thyroid: No thyromegaly.      Vascular: No carotid bruit.      Trachea: No tracheal deviation.   Cardiovascular:      Rate and Rhythm: Normal rate and regular rhythm.      Pulses:           Carotid pulses are 2+ on the right side and 2+ on the left side.     Heart sounds: Normal heart sounds. No murmur heard.     No friction rub. No gallop.   Pulmonary:      Effort: Pulmonary effort is normal. No respiratory distress.      Breath sounds: Normal breath sounds. No wheezing or rales.   Abdominal:      General: Bowel sounds are normal. There is no distension.      Palpations: Abdomen is soft.      Tenderness: There is no abdominal tenderness. "   Musculoskeletal:      Cervical back: Normal range of motion and neck supple.   Lymphadenopathy:      Cervical: No cervical adenopathy.   Skin:     General: Skin is warm and dry.   Neurological:      Mental Status: He is alert and oriented to person, place, and time.      Coordination: Coordination normal.   Psychiatric:         Behavior: Behavior normal.         Thought Content: Thought content normal.         Judgment: Judgment normal.           Preop labs/testing available and reviewed: yes    eGFRcr   Date Value Ref Range Status   2024 92 >59 Final        INR   Date Value Ref Range Status   2024 1.1  Final     Comment:     Interpretation  Suggested INR ranges for various  clinical conditions:                                           INR  Ambulatory Surgery          <1.5  Coumadinized Patients             (DVT,PE,MI or A.Fib)     2.0-3.0  Mechanical Heart Valve   2.5-3.5  Cardiogenic Embolus      2.5-3.5       EKG yes: Normal sinus rhythm, no abnormalities.    Echo no    Stress test/cath no    PFT/Steven no    Functional capacity: Shovel snow                          6 Mets   Pick the highest level patient can comfortably perform   4 mets or greater for surgery    RCRI  High Risk surgery?         1 Point  CAD History:         1 Point   MI; Positive Stress Test; CP due to Mi;  Nitrate Usage to control Angina; Pathologic Q wave on EKG  CHF Active:         1 Point   Pulm Edema; Paroxysmal Nocturnal Dyspnea;  Bibasilar Rales (crackles);S3; CHF on CXR  Cerebrovascular Disease (TIA or CVA):     1 Point  DM on Insulin:        1 Point  Serum Creat >2.0 mg/dl:       1 Point          Total Points: 0     Scorin: Class I, Very Low Risk (0.4%)     1: Class II, Low risk (0.9%)     2: Class III Moderate (6.6%)     3: Class IV High (>11%)      JERALD Risk:  GFR:   eGFRcr   Date Value Ref Range Status   2024 92 >59 Final         For PCP:  If GFR>60, Hold ACE/ARB/Diuretic on the day of surgery, and NSAIDS 10  days before.    If GFR<45, Consider PRE and POST op Nephrology Consult.    If 46 <GFR> 59 : Has Patient had JERALD in last 6 Months? no   If YES: Preop Nephrology consult   If No:  Post Op Nephrology consult.           Assessment/Plan:    Patient is medically optimized (cleared) for the planned procedure.    Further testing/evaluation is not required.    Postop concerns: no    1. Pre-op examination  Comments:  Low risk for the planned surgery.  May proceed at this point.  Orders:  -     POCT ECG  2. Primary osteoarthritis of both knees  Assessment & Plan:  Patient appears at low risk for the planned left knee replacement.  May proceed at this time.  3. Mixed hyperlipidemia  Assessment & Plan:  Cholesterol stable from before.  On simvastatin.  Check as scheduled.  4. Mild intermittent asthma without complication  Assessment & Plan:  Lungs clear today.  Doing quite well as far as asthma is concerned.  Really does not have any specific issues or problems.  May need albuterol perioperatively if he has any wheezing.  Otherwise, should not be an issue.  Did not have any problems with his recent hernia repairs.  5. Gastroesophageal reflux disease without esophagitis  Assessment & Plan:  Stable.  Not on medications.  No changes.  6. History of DVT (deep vein thrombosis)  Assessment & Plan:  Likely full anticoagulation postop.  Patient has discussed this previously with orthopedics.  Reviewed about novel oral anticoagulants such as Xarelto, Eliquis, Pradaxa.  Again, defer to orthopedics.  7. Umbilical hernia without obstruction and without gangrene  Assessment & Plan:  Surgically repaired recently.  Doing quite well.  Follow-up with general surgery if needed.  8. Allergic rhinitis, unspecified seasonality, unspecified trigger  Assessment & Plan:  Recommend avoid Claritin-D about 3 days prior to surgery due to the ED potentially changing heart rate and blood pressure.  9. Non-recurrent unilateral inguinal hernia without  "obstruction or gangrene  Assessment & Plan:  Postrepair.  Doing quite well at this point.  Follow-up as needed.              Pre-Surgery Instructions:   Medication Instructions   • acetaminophen (TYLENOL) 650 mg CR tablet per anesthesia guidelines    • albuterol (PROVENTIL HFA,VENTOLIN HFA) 90 mcg/act inhaler per anesthesia guidelines    • gabapentin (NEURONTIN) 300 mg capsule per anesthesia guidelines    • Ibuprofen 200 MG CAPS Stop taking 1 week prior to surgery   • loratadine-pseudoephedrine (Wal-itin D 24 Hour)  mg per 24 hr tablet Stop taking 3 days prior to surgery   • Multiple Vitamin (MULTI-VITAMIN DAILY PO) Stop taking 1 week prior to surgery   • sildenafil (Viagra) 100 mg tablet Stop taking 3 days prior to surgery   • simvastatin (ZOCOR) 40 mg tablet per anesthesia guidelines         NOTE: Please use the above to review important meds for your specialty, the remainder \"per anesthesia Guidelines.\"    NOTE: Please place an Inbasket message for \"SOC\" pool for complicated patients.    "

## 2024-07-15 NOTE — ASSESSMENT & PLAN NOTE
Lungs clear today.  Doing quite well as far as asthma is concerned.  Really does not have any specific issues or problems.  May need albuterol perioperatively if he has any wheezing.  Otherwise, should not be an issue.  Did not have any problems with his recent hernia repairs.

## 2024-08-02 DIAGNOSIS — J45.20 MILD INTERMITTENT ASTHMA WITHOUT COMPLICATION: ICD-10-CM

## 2024-08-02 RX ORDER — ALBUTEROL SULFATE 90 UG/1
2 AEROSOL, METERED RESPIRATORY (INHALATION) EVERY 4 HOURS PRN
Qty: 18 G | Refills: 5 | Status: SHIPPED | OUTPATIENT
Start: 2024-08-02

## 2024-08-02 NOTE — TELEPHONE ENCOUNTER
Reason for call:   [x] Refill   [] Prior Auth  [] Other:     Office:   [x] PCP/Provider - Deshawn Conti  [] Specialty/Provider -     Medication:       Ph[facility]: [facility] [place] [facility]    Does the patient have enough for 3 days?   [] Yes   [x] No - Send as HP to POD

## 2024-09-09 ENCOUNTER — TELEPHONE (OUTPATIENT)
Age: 64
End: 2024-09-09

## 2024-09-09 DIAGNOSIS — M54.16 LUMBAR RADICULOPATHY: ICD-10-CM

## 2024-09-09 RX ORDER — GABAPENTIN 300 MG/1
CAPSULE ORAL
Qty: 270 CAPSULE | Refills: 0 | Status: SHIPPED | OUTPATIENT
Start: 2024-09-09

## 2024-09-09 NOTE — TELEPHONE ENCOUNTER
Caller: Patient     Doctor: Martha     Reason for call: Calling to see if appt today can be virtual.       Call back#: Per office transfer to them

## 2024-09-16 ENCOUNTER — OFFICE VISIT (OUTPATIENT)
Dept: FAMILY MEDICINE CLINIC | Facility: CLINIC | Age: 64
End: 2024-09-16
Payer: COMMERCIAL

## 2024-09-16 VITALS
WEIGHT: 212.8 LBS | HEART RATE: 80 BPM | BODY MASS INDEX: 28.08 KG/M2 | OXYGEN SATURATION: 98 % | DIASTOLIC BLOOD PRESSURE: 70 MMHG | SYSTOLIC BLOOD PRESSURE: 132 MMHG

## 2024-09-16 DIAGNOSIS — Z86.718 HISTORY OF DVT (DEEP VEIN THROMBOSIS): ICD-10-CM

## 2024-09-16 DIAGNOSIS — J45.20 MILD INTERMITTENT ASTHMA WITHOUT COMPLICATION: ICD-10-CM

## 2024-09-16 DIAGNOSIS — Z96.652 HISTORY OF LEFT KNEE REPLACEMENT: Primary | ICD-10-CM

## 2024-09-16 PROCEDURE — 99214 OFFICE O/P EST MOD 30 MIN: CPT | Performed by: FAMILY MEDICINE

## 2024-09-16 NOTE — PROGRESS NOTES
Ambulatory Visit  Name: Naldo Fox      : 1960      MRN: [id number]  Encounter Provider: Deshawn Varma MD  Encounter Date: 2024   Encounter department: [facility]    Assessment & Plan  History of left knee replacement  Stable so far.  Following with orthopedics.  No specific issues or problems.         History of DVT (deep vein thrombosis)  Patient did take full anticoagulation for 3 weeks, then switch to aspirin daily for another 4 weeks, per orthopedic recommendations.    No current findings of DVT.         Mild intermittent asthma without complication  Doing quite well.  Lungs clear today.  No changes.  Does not really need much at this point.  Does have it available.            Chief Complaint   Patient presents with    Follow-up     3m       History of Present Illness     Patient is here to follow-up after his recent knee replacement on the left.  Doing quite well.  Currently in physical therapy, going several times a week.  Still some discomfort.  Using Advil as needed.  Using 400 mg twice daily for Advil, as well as Tylenol at bedtime.          Review of Systems   Constitutional: Negative.    HENT: Negative.     Eyes: Negative.    Respiratory: Negative.     Cardiovascular: Negative.    Gastrointestinal: Negative.    Endocrine: Negative.    Genitourinary: Negative.    Musculoskeletal: Negative.    Skin: Negative.    Allergic/Immunologic: Negative.    Neurological: Negative.    Hematological: Negative.    Psychiatric/Behavioral: Negative.       Past Medical History:   Diagnosis Date    Allergic     Arthritis     Asthma     Colon polyp     Diverticulosis     DVT (deep venous thrombosis) (HCC)     after knee sx    Family hx colonic polyps     mother    Family hx of colon cancer     maternal Aunt, Uncle    Hyperlipidemia     Inguinal hernia 10/02/2023    Non-recurrent bilateral inguinal hernia without obstruction or gangrene 2023    Osteoarthritis      Peyronie disease     Umbilical hernia without obstruction and without gangrene 02/21/2024     Past Surgical History:   Procedure Laterality Date    COLONOSCOPY      KNEE ARTHROSCOPY      KNEE SURGERY Left     ORTHOPEDIC SURGERY      MD ESOPHAGOGASTRODUODENOSCOPY TRANSORAL DIAGNOSTIC N/A 07/05/2017    Procedure: ESOPHAGOGASTRODUODENOSCOPY (EGD);  Surgeon: Emmanuel Payne MD;  Location: [facility] GI LAB;  Service: Gastroenterology    MD LAPAROSCOPY SURG RPR INITIAL INGUINAL HERNIA Bilateral 4/18/2024    Procedure: REPAIR HERNIA INGUINAL  W/ ROBOTICS WITH MESH;  Surgeon: Sukhdev Medina MD;  [facility];  Service: General    MD RPR AA HERNIA 1ST < 3 CM REDUCIBLE N/A 4/18/2024    Procedure: REPAIR HERNIA UMBILICAL;  Surgeon: Sukhdev Medina MD;  Location[facility];  Service: General    SHOULDER SURGERY       Family History   Problem Relation Age of Onset    Other Mother         headaches    Lung cancer Mother     Cancer Mother     Lymphoma Father         acute    Heart attack Father     Esophageal cancer Father     Cancer Father     Coronary artery disease Father     Heart disease Father     Colon cancer Family     Other Family         headaches    Skin cancer Family      Social History     Tobacco Use    Smoking status: Never     Passive exposure: Never    Smokeless tobacco: Never   Vaping Use    Vaping status: Never Used   Substance and Sexual Activity    Alcohol use: Yes     Alcohol/week: 1.0 standard drink of alcohol     Types: 1 Cans of beer per week     Comment: Occasionally will have a beer. Normally weeks between    Drug use: No    Sexual activity: Not on file     Current Outpatient Medications on File Prior to Visit   Medication Sig    acetaminophen (TYLENOL) 650 mg CR tablet Take 650 mg by mouth every 8 (eight) hours as needed for mild pain    albuterol (PROVENTIL HFA,VENTOLIN HFA) 90 mcg/act inhaler Inhale 2 puffs every 4 (four) hours as needed for wheezing or shortness of breath    gabapentin  (NEURONTIN) 300 mg capsule TAKE 1 CAPSULE BY MOUTH 3 TIMES A DAY    Ibuprofen 200 MG CAPS Take by mouth 600mg Q AM    loratadine-pseudoephedrine (Wal-itin D 24 Hour)  mg per 24 hr tablet Take 1 tablet by mouth daily    Multiple Vitamin (MULTI-VITAMIN DAILY PO) Take by mouth    sildenafil (Viagra) 100 mg tablet Take as directed.    simvastatin (ZOCOR) 40 mg tablet Take 1 tablet (40 mg total) by mouth daily    ciprofloxacin-dexamethasone (CIPRODEX) otic suspension Administer 4 drops to the right ear 2 (two) times a day for 7 days (Patient not taking: Reported on 5/1/2024)     Allergies   Allergen Reactions    Bee Pollen     Cat Hair Extract     Pollen Extract      Immunization History   Administered Date(s) Administered    COVID-19 PFIZER VACCINE 0.3 ML IM 04/08/2021, 04/30/2021, 12/03/2021    COVID-19 Pfizer Vac BIVALENT Joaquin-sucrose 12 Yr+ IM 11/14/2022    COVID-19 Pfizer mRNA vacc PF joaquin-sucrose 12 yr and older (Comirnaty) 09/29/2023    COVID-19 Pfizer vac (Joaquin-sucrose, gray cap) 12 yr+ IM 07/16/2022    INFLUENZA 01/01/2014, 01/01/2016, 11/09/2018, 12/15/2021, 11/05/2022    Influenza, injectable, quadrivalent, preservative free 0.5 mL 11/25/2018, 11/09/2019, 10/02/2023    Influenza, seasonal, injectable 1960, 01/01/2016    Tdap 10/09/2011, 03/17/2014    Zoster Vaccine Recombinant 05/19/2022, 09/02/2022     Objective     /70   Pulse 80   Wt 96.5 kg (212 lb 12.8 oz)   SpO2 98%   BMI 28.08 kg/m²     Physical Exam  Vitals and nursing note reviewed.   Constitutional:       Appearance: Normal appearance. He is well-developed.   HENT:      Head: Normocephalic and atraumatic.   Cardiovascular:      Rate and Rhythm: Normal rate and regular rhythm.      Pulses:           Carotid pulses are 2+ on the right side and 2+ on the left side.     Heart sounds: Normal heart sounds. No murmur heard.     No friction rub. No gallop.   Pulmonary:      Effort: Pulmonary effort is normal. No respiratory distress.       Breath sounds: Normal breath sounds. No wheezing or rales.   Musculoskeletal:      Cervical back: Normal range of motion and neck supple.   Skin:         Neurological:      Mental Status: He is alert.

## 2024-09-16 NOTE — ASSESSMENT & PLAN NOTE
Doing quite well.  Lungs clear today.  No changes.  Does not really need much at this point.  Does have it available.

## 2024-09-16 NOTE — ASSESSMENT & PLAN NOTE
Patient did take full anticoagulation for 3 weeks, then switch to aspirin daily for another 4 weeks, per orthopedic recommendations.    No current findings of DVT.

## 2024-09-16 NOTE — PATIENT INSTRUCTIONS
1. History of left knee replacement  Assessment & Plan:  Stable so far.  Following with orthopedics.  No specific issues or problems.         2. History of DVT (deep vein thrombosis)  Assessment & Plan:  Patient did take full anticoagulation for 3 weeks, then switch to aspirin daily for another 4 weeks, per orthopedic recommendations.    No current findings of DVT.         3. Mild intermittent asthma without complication  Assessment & Plan:  Doing quite well.  Lungs clear today.  No changes.  Does not really need much at this point.  Does have it available.             COVID 19 Instructions    Naldo Fox was advised to limit contact with others to essential tasks such as getting food, medications, and medical care.    Proper handwashing reviewed, and Hand sanitzer when washing is not available.    If the patient develops symptoms of COVID 19, the patient should call the office as soon as possible.    It is strongly recommended that Flu Vaccinations be obtained.      Virtual Visits:  Lamar: This works on smart phones (any phone with Internet browsing capability).  You should get a text message when the provider is ready to see you.  Click on the link in the text message, and the call should start.  You will need to type in your name, and allow camera and microphone access.  This is HIPPA compliant, and secure.      If you have not already done so, get immunized to COVID 19.      We are committed to getting you vaccinated as soon as possible and will be closely following CDC and [facility] guidelines as they are released and revised.  Please refer to our COVID-19 vaccine webpage for the most up to date information on the vaccine and our distribution efforts.    This site will also have the most up to date recommendations for COVID booster vaccine.    [email or web address]    Call [phone] ([phone]), option 7    You can also visit https://www.vaccines.gov/ to  find vaccines in your area.    OUR LOCATION:    [facility]  [address] [place] [address], [address]  [place], [place], [address] [phone]  Fax: [phone]    Lab services, Rheumatology, and OB/GYN are at this location as well.

## 2024-10-08 LAB
ALBUMIN SERPL-MCNC: 4.2 G/DL (ref 3.6–5.1)
ALBUMIN/GLOB SERPL: 1.5 (CALC) (ref 1–2.5)
ALP SERPL-CCNC: 67 U/L (ref 35–144)
ALT SERPL-CCNC: 20 U/L (ref 9–46)
AST SERPL-CCNC: 20 U/L (ref 10–35)
BILIRUB SERPL-MCNC: 0.4 MG/DL (ref 0.2–1.2)
BUN SERPL-MCNC: 23 MG/DL (ref 7–25)
BUN/CREAT SERPL: ABNORMAL (CALC) (ref 6–22)
CALCIUM SERPL-MCNC: 9.7 MG/DL (ref 8.6–10.3)
CHLORIDE SERPL-SCNC: 104 MMOL/L (ref 98–110)
CHOLEST SERPL-MCNC: 177 MG/DL
CHOLEST/HDLC SERPL: 3.5 (CALC)
CO2 SERPL-SCNC: 31 MMOL/L (ref 20–32)
CREAT SERPL-MCNC: 0.99 MG/DL (ref 0.7–1.35)
GFR/BSA.PRED SERPLBLD CYS-BASED-ARV: 85 ML/MIN/1.73M2
GLOBULIN SER CALC-MCNC: 2.8 G/DL (CALC) (ref 1.9–3.7)
GLUCOSE SERPL-MCNC: 101 MG/DL (ref 65–99)
HDLC SERPL-MCNC: 50 MG/DL
LDLC SERPL CALC-MCNC: 104 MG/DL (CALC)
NONHDLC SERPL-MCNC: 127 MG/DL (CALC)
POTASSIUM SERPL-SCNC: 4 MMOL/L (ref 3.5–5.3)
PROT SERPL-MCNC: 7 G/DL (ref 6.1–8.1)
SODIUM SERPL-SCNC: 142 MMOL/L (ref 135–146)
TRIGL SERPL-MCNC: 124 MG/DL

## 2024-10-14 ENCOUNTER — OFFICE VISIT (OUTPATIENT)
Dept: FAMILY MEDICINE CLINIC | Facility: CLINIC | Age: 64
End: 2024-10-14
Payer: COMMERCIAL

## 2024-10-14 VITALS
TEMPERATURE: 97.9 F | BODY MASS INDEX: 28.63 KG/M2 | HEART RATE: 78 BPM | OXYGEN SATURATION: 98 % | SYSTOLIC BLOOD PRESSURE: 130 MMHG | WEIGHT: 216 LBS | RESPIRATION RATE: 16 BRPM | DIASTOLIC BLOOD PRESSURE: 80 MMHG | HEIGHT: 73 IN

## 2024-10-14 DIAGNOSIS — J45.20 MILD INTERMITTENT ASTHMA WITHOUT COMPLICATION: ICD-10-CM

## 2024-10-14 DIAGNOSIS — Z23 NEED FOR INFLUENZA VACCINATION: ICD-10-CM

## 2024-10-14 DIAGNOSIS — M51.26 LUMBAR DISC HERNIATION: ICD-10-CM

## 2024-10-14 DIAGNOSIS — J30.9 ALLERGIC RHINITIS, UNSPECIFIED SEASONALITY, UNSPECIFIED TRIGGER: ICD-10-CM

## 2024-10-14 DIAGNOSIS — L30.9 ECZEMA, UNSPECIFIED TYPE: ICD-10-CM

## 2024-10-14 DIAGNOSIS — Z23 NEED FOR DIPHTHERIA-TETANUS-PERTUSSIS (TDAP) VACCINE: ICD-10-CM

## 2024-10-14 DIAGNOSIS — G43.909 MIGRAINE WITHOUT STATUS MIGRAINOSUS, NOT INTRACTABLE, UNSPECIFIED MIGRAINE TYPE: ICD-10-CM

## 2024-10-14 DIAGNOSIS — Z23 NEED FOR COVID-19 VACCINE: ICD-10-CM

## 2024-10-14 DIAGNOSIS — E78.2 MIXED HYPERLIPIDEMIA: Primary | ICD-10-CM

## 2024-10-14 PROCEDURE — 90480 ADMN SARSCOV2 VAC 1/ONLY CMP: CPT

## 2024-10-14 PROCEDURE — 91320 SARSCV2 VAC 30MCG TRS-SUC IM: CPT

## 2024-10-14 PROCEDURE — 99214 OFFICE O/P EST MOD 30 MIN: CPT | Performed by: FAMILY MEDICINE

## 2024-10-14 RX ORDER — LORATADINE 10 MG
1 TABLET ORAL DAILY
Qty: 90 TABLET | Refills: 1 | Status: SHIPPED | OUTPATIENT
Start: 2024-10-14

## 2024-10-14 RX ORDER — SIMVASTATIN 40 MG
40 TABLET ORAL DAILY
Qty: 90 TABLET | Refills: 3 | Status: SHIPPED | OUTPATIENT
Start: 2024-10-14

## 2024-10-14 NOTE — ASSESSMENT & PLAN NOTE
Stable with Claritin-D..  Reviewed about side effects of decongestants.    Orders:    loratadine-pseudoephedrine (Wal-itin D 24 Hour)  mg per 24 hr tablet; Take 1 tablet by mouth daily

## 2024-10-14 NOTE — ASSESSMENT & PLAN NOTE
Minimal problems, would recommend exercise/physical therapy.  Perhaps they can widen the appointments to include the back, not just knees.

## 2024-10-14 NOTE — PROGRESS NOTES
Ambulatory Visit  Name: Naldo Fox      : 1960      MRN: [id number]  Encounter Provider: Deshawn Varma MD  Encounter Date: 10/14/2024   Encounter department: [facility] PRIMARY CARE    Assessment & Plan  Mixed hyperlipidemia  Cholesterol doing quite well.  Continue simvastatin, check in 6 months.  Orders:    simvastatin (ZOCOR) 40 mg tablet; Take 1 tablet (40 mg total) by mouth daily    Comprehensive metabolic panel; Future    Lipid panel; Future    Comprehensive metabolic panel    Lipid panel    Mild intermittent asthma without complication  Asthma excellent.  No current wheezing or shortness of breath.  Recommend COVID and flu vaccine this year.       Migraine without status migrainosus, not intractable, unspecified migraine type  No current symptoms or problems       Lumbar disc herniation  Minimal problems, would recommend exercise/physical therapy.  Perhaps they can widen the appointments to include the back, not just knees.       Allergic rhinitis, unspecified seasonality, unspecified trigger  Stable with Claritin-D..  Reviewed about side effects of decongestants.    Orders:    loratadine-pseudoephedrine (Wal-itin D 24 Hour)  mg per 24 hr tablet; Take 1 tablet by mouth daily    Eczema, unspecified type  Recommend follow with Derm as has 2025 appointment.  Can continue PRN ciprodex.         Need for COVID-19 vaccine    Orders:    COVID-19 Pfizer mRNA vaccine 12 yr and older (Comirnaty pre-filled syringe)    Need for influenza vaccination         Need for diphtheria-tetanus-pertussis (Tdap) vaccine            History of Present Illness     Patient is here to follow-up on multiple issues.    Hyperlipidemia: Currently on simvastatin.  No specific issues or problems.  Reviewed labs.    Asthma: Stable.  Does have an albuterol inhaler available.  Has not needed to use it.    Allergic rhinitis: Currently using loratadine with Sudafed.  Uses daily.  Seems to help out quite a  "bit.  Reviewed blood pressure, has not been affected by that.    Migraines: No specific issues or problems currently.  Has had in the past, but again no problems at the moment.    Lumbar disc herniation: Noted previously.  Continues to have some aches and pains.  He is in physical therapy, however for his knee.  May end up helping somewhat with the back.    He did ask about vaccinations, including tetanus, influenza, COVID.  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          Review of Systems   Constitutional:  Negative for appetite change and fever.   Respiratory:  Negative for chest tightness and shortness of breath.    Cardiovascular:  Negative for chest pain, palpitations and leg swelling.   Gastrointestinal:  Negative for abdominal pain.   Genitourinary:  Negative for difficulty urinating.   Musculoskeletal:  Negative for arthralgias and myalgias.   Skin:  Negative for wound.   Neurological:  Negative for dizziness, light-headedness and headaches.   Psychiatric/Behavioral:  Negative for dysphoric mood and sleep disturbance. The patient is not nervous/anxious.            Objective     /80 (BP Location: Right arm, Patient Position: Sitting, Cuff Size: Adult)   Pulse 78   Temp 97.9 °F (36.6 °C) (Temporal)   Resp 16   Ht 6' 1\" (1.854 m)   Wt 98 kg (216 lb)   SpO2 98%   BMI 28.50 kg/m²     Blood sugar 101.  Creatinine 0.99, GFR 85.  Sodium 142, potassium 4.0, calcium 9.7.  AST 20, ALT 20.  Total cholesterol 177, , HDL 50, triglycerides 124.    Physical Exam  Vitals and nursing note reviewed.   Constitutional:       Appearance: Normal appearance. He is well-developed.   HENT:      Head: Normocephalic and atraumatic.   Cardiovascular:      Rate and Rhythm: Normal rate and regular rhythm.      Pulses:           Carotid pulses are 2+ on the right side and 2+ on the left side.     Heart sounds: Normal " heart sounds. No murmur heard.     No friction rub. No gallop.   Pulmonary:      Effort: Pulmonary effort is normal. No respiratory distress.      Breath sounds: Normal breath sounds. No wheezing or rales.   Musculoskeletal:      Cervical back: Normal range of motion and neck supple.   Neurological:      Mental Status: He is alert.

## 2024-10-14 NOTE — PATIENT INSTRUCTIONS
1. Mixed hyperlipidemia  Assessment & Plan:  Cholesterol doing quite well.  Continue simvastatin, check in 6 months.  Orders:    simvastatin (ZOCOR) 40 mg tablet; Take 1 tablet (40 mg total) by mouth daily    Comprehensive metabolic panel; Future    Lipid panel; Future    Comprehensive metabolic panel    Lipid panel    Orders:  -     simvastatin (ZOCOR) 40 mg tablet; Take 1 tablet (40 mg total) by mouth daily  -     Comprehensive metabolic panel; Future; Expected date: 04/14/2025  -     Lipid panel; Future; Expected date: 04/14/2025  -     Comprehensive metabolic panel  -     Lipid panel  2. Mild intermittent asthma without complication  Assessment & Plan:  Asthma excellent.  No current wheezing or shortness of breath.  Recommend COVID and flu vaccine this year.       3. Migraine without status migrainosus, not intractable, unspecified migraine type  Assessment & Plan:  No current symptoms or problems       4. Lumbar disc herniation  Assessment & Plan:  Minimal problems, would recommend exercise/physical therapy.  Perhaps they can widen the appointments to include the back, not just knees.       5. Allergic rhinitis, unspecified seasonality, unspecified trigger  Assessment & Plan:  Stable with Claritin-D..  Reviewed about side effects of decongestants.    Orders:    loratadine-pseudoephedrine (Wal-itin D 24 Hour)  mg per 24 hr tablet; Take 1 tablet by mouth daily    Orders:  -     loratadine-pseudoephedrine (Wal-itin D 24 Hour)  mg per 24 hr tablet; Take 1 tablet by mouth daily  6. Eczema, unspecified type  Assessment & Plan:  Recommend follow with Derm as has April 2025 appointment.  Can continue PRN ciprodex.         7. Need for COVID-19 vaccine  Comments:  Would recommend COVID-vaccine today.  Orders:  -     COVID-19 Pfizer mRNA vaccine 12 yr and older (Comirnaty pre-filled syringe)  8. Need for influenza vaccination  Comments:  Patient will be receiving influenza vaccine shortly with his  employer.  9. Need for diphtheria-tetanus-pertussis (Tdap) vaccine  Comments:  Despite appropriate timing for tetanus, I would prefer that he have the COVID and flu vaccine due to planned taking care of someone, and wishing to reduce risk.      COVID 19 Instructions    Naldo Fox was advised to limit contact with others to essential tasks such as getting food, medications, and medical care.    Proper handwashing reviewed, and Hand sanitzer when washing is not available.    If the patient develops symptoms of COVID 19, the patient should call the office as soon as possible.    It is strongly recommended that Flu Vaccinations be obtained.      Virtual Visits:  AmWell: This works on smart phones (any phone with Internet browsing capability).  You should get a text message when the provider is ready to see you.  Click on the link in the text message, and the call should start.  You will need to type in your name, and allow camera and microphone access.  This is HIPPA compliant, and secure.      If you have not already done so, get immunized to COVID 19.      We are committed to getting you vaccinated as soon as possible and will be closely following CDC and [facility] guidelines as they are released and revised.  Please refer to our COVID-19 vaccine webpage for the most up to date information on the vaccine and our distribution efforts.    This site will also have the most up to date recommendations for COVID booster vaccine.    [email or web address]    Call [phone] ([phone]), option 7    You can also visit https://www.vaccines.gov/ to find vaccines in your area.    OUR LOCATION:    [facility]  [facility], [address]  [place], [place], [address] [phone]  Fax: [phone]    Lab services, Rheumatology, and OB/GYN are at this location as well.  Thank you for your inquiry about the RSV vaccine.  As you can see below, the CDC has  recommended that you discuss this with your Primary Care provider and decide if the vaccine is right for you.  This discussion can be accomplished at your next office visit.  The vaccine is currently available at your local pharmacy if you choose to get it prior to your next office visit.     Respiratory syncytial (sin[facility]) virus, or RSV, is a common respiratory virus that usually causes mild, cold-like symptoms. Most people recover in a week or two, but RSV can be serious. Infants and older adults are more likely to develop severe RSV and need hospitalization. Vaccines are available to protect older adults from severe RSV. Monoclonal antibody products are available to protect infants and young children from severe RSV.    CDC Recommendations  Adults 60 years old and over  Adults 60 years of age and older may receive a single dose of RSV vaccine using shared clinical decision-making.    Infants and young children  1 dose of nirsevimab for all infants younger than 8 months born during or entering their first RSV season.  1 dose of nirsevimab for infants and children 8-19 months old who are at increased risk for severe RSV disease and entering their second RSV season.  Note: A different monoclonal antibody, palivizumab, is limited to children under 24 months of age with certain conditions that place them at high risk for severe RSV disease. It must be given once a month during RSV season. Please see AAP guidelines for palivizumab.    Pregnant people  1 dose of maternal RSV vaccine during weeks 32 through 36 of pregnancy, administered immediately before or during RSV season. Abrysvo is the only RSV vaccine recommended during pregnancy.    If you have any questions about RSV or the products above, talk to your healthcare provider.

## 2024-10-14 NOTE — ASSESSMENT & PLAN NOTE
Asthma excellent.  No current wheezing or shortness of breath.  Recommend COVID and flu vaccine this year.

## 2024-10-14 NOTE — ASSESSMENT & PLAN NOTE
Cholesterol doing quite well.  Continue simvastatin, check in 6 months.  Orders:    simvastatin (ZOCOR) 40 mg tablet; Take 1 tablet (40 mg total) by mouth daily    Comprehensive metabolic panel; Future    Lipid panel; Future    Comprehensive metabolic panel    Lipid panel

## 2024-12-17 DIAGNOSIS — H60.391 OTHER INFECTIVE ACUTE OTITIS EXTERNA OF RIGHT EAR: ICD-10-CM

## 2024-12-17 RX ORDER — CIPROFLOXACIN AND DEXAMETHASONE 3; 1 MG/ML; MG/ML
4 SUSPENSION/ DROPS AURICULAR (OTIC) 2 TIMES DAILY
Qty: 7.5 ML | Refills: 0 | Status: SHIPPED | OUTPATIENT
Start: 2024-12-17 | End: 2024-12-24

## 2024-12-17 NOTE — TELEPHONE ENCOUNTER
Reason for call:   [x] Refill   [] Prior Auth  [] Other:     Office:   [x] PCP/Provider - Deshawn Varma MD   [] Specialty/Provider -     Medication: ciprofloxacin-dexamethasone (CIPRODEX) otic suspension /  Administer 4 drops to the right ear 2 (two) times a day for 7 days     Pharmacy: [facility]/pharmacy #[id number] - [place], [place] - [address].     Does the patient have enough for 3 days?   [] Yes   [x] No - Send as HP to POD

## 2025-01-28 ENCOUNTER — TELEPHONE (OUTPATIENT)
Age: 65
End: 2025-01-28

## 2025-01-28 DIAGNOSIS — M54.16 LUMBAR RADICULOPATHY: ICD-10-CM

## 2025-01-28 RX ORDER — GABAPENTIN 300 MG/1
CAPSULE ORAL
Qty: 90 CAPSULE | Refills: 0 | Status: SHIPPED | OUTPATIENT
Start: 2025-01-28

## 2025-01-28 NOTE — TELEPHONE ENCOUNTER
S/w pt, confirmed gabapentin 300 mg 1 pill po tid. Will run out tomorrow. + relief, no se's. Pt stated that he is in [place], helping a friend recover. Pt stated that he will be back in [place] for his ov on 2/20/25 with [place]. Advised pt, anticipate a rx will be sent to [facility] on [address] [facility] as requested. This office will cb if questions or issues arise. Pt verbalized understanding and appreciation.

## 2025-01-28 NOTE — TELEPHONE ENCOUNTER
Pt contacted Call Center requested refill of their medication.      PT HAS BEEN OUT OF TOWN FOR THE LAST 3 MONTHS.  PT IS SCHEDULED FOR 2/20 AS THE NEXT OV THAT WAS OPEN.  CAN PT GET A SCRIPT TO HOLD HIM UNTIL THAT APPT      Doctor Name: Heidi      Medication Name: gabapentin      Dosage of Med: 300 mg       Frequency of Med: 1 capsule 3 x per day      Remaining Medication: 3 left for tomorrow      Pharmacy and Location: [facility]/pharmacy #[id number] - [facility] [address] [phone]         Pt. Preferred Callback Phone Number: [phone]       Thank you.       PLEASE ADVISE PATIENTS:    REFILL REQUESTS WILL BE PROCESSED WITHIN 24-48 HOURS.

## 2025-02-20 ENCOUNTER — OFFICE VISIT (OUTPATIENT)
Dept: PAIN MEDICINE | Facility: CLINIC | Age: 65
End: 2025-02-20
Payer: COMMERCIAL

## 2025-02-20 VITALS — HEIGHT: 73 IN | WEIGHT: 222 LBS | BODY MASS INDEX: 29.42 KG/M2

## 2025-02-20 DIAGNOSIS — M51.9 FORAMINAL STENOSIS DUE TO INTERVERTEBRAL DISC DISEASE: ICD-10-CM

## 2025-02-20 DIAGNOSIS — G89.4 CHRONIC PAIN SYNDROME: ICD-10-CM

## 2025-02-20 DIAGNOSIS — M99.79 FORAMINAL STENOSIS DUE TO INTERVERTEBRAL DISC DISEASE: ICD-10-CM

## 2025-02-20 DIAGNOSIS — M54.16 LUMBAR RADICULOPATHY: ICD-10-CM

## 2025-02-20 DIAGNOSIS — M47.816 LUMBAR SPONDYLOSIS: ICD-10-CM

## 2025-02-20 DIAGNOSIS — M51.26 LUMBAR DISC HERNIATION: Primary | ICD-10-CM

## 2025-02-20 PROCEDURE — 99214 OFFICE O/P EST MOD 30 MIN: CPT | Performed by: NURSE PRACTITIONER

## 2025-02-20 RX ORDER — GABAPENTIN 300 MG/1
CAPSULE ORAL
Qty: 270 CAPSULE | Refills: 1 | Status: SHIPPED | OUTPATIENT
Start: 2025-02-20

## 2025-02-28 LAB — HBA1C MFR BLD HPLC: 5.8 %

## 2025-04-04 ENCOUNTER — APPOINTMENT (OUTPATIENT)
Dept: LAB | Facility: CLINIC | Age: 65
End: 2025-04-04
Payer: COMMERCIAL

## 2025-04-04 DIAGNOSIS — E78.2 MIXED HYPERLIPIDEMIA: Primary | ICD-10-CM

## 2025-04-04 LAB
ALBUMIN SERPL BCG-MCNC: 4.5 G/DL (ref 3.5–5)
ALP SERPL-CCNC: 58 U/L (ref 34–104)
ALT SERPL W P-5'-P-CCNC: 25 U/L (ref 7–52)
ANION GAP SERPL CALCULATED.3IONS-SCNC: 7 MMOL/L (ref 4–13)
AST SERPL W P-5'-P-CCNC: 25 U/L (ref 13–39)
BILIRUB SERPL-MCNC: 0.74 MG/DL (ref 0.2–1)
BUN SERPL-MCNC: 20 MG/DL (ref 5–25)
CALCIUM SERPL-MCNC: 9.9 MG/DL (ref 8.4–10.2)
CHLORIDE SERPL-SCNC: 103 MMOL/L (ref 96–108)
CHOLEST SERPL-MCNC: 164 MG/DL (ref ?–200)
CO2 SERPL-SCNC: 31 MMOL/L (ref 21–32)
CREAT SERPL-MCNC: 0.97 MG/DL (ref 0.6–1.3)
GFR SERPL CREATININE-BSD FRML MDRD: 82 ML/MIN/1.73SQ M
GLUCOSE P FAST SERPL-MCNC: 90 MG/DL (ref 65–99)
HDLC SERPL-MCNC: 48 MG/DL
LDLC SERPL CALC-MCNC: 93 MG/DL (ref 0–100)
NONHDLC SERPL-MCNC: 116 MG/DL
POTASSIUM SERPL-SCNC: 4.3 MMOL/L (ref 3.5–5.3)
PROT SERPL-MCNC: 7.4 G/DL (ref 6.4–8.4)
SODIUM SERPL-SCNC: 141 MMOL/L (ref 135–147)
TRIGL SERPL-MCNC: 117 MG/DL (ref ?–150)

## 2025-04-04 PROCEDURE — 36415 COLL VENOUS BLD VENIPUNCTURE: CPT

## 2025-04-04 PROCEDURE — 80053 COMPREHEN METABOLIC PANEL: CPT

## 2025-04-04 PROCEDURE — 80061 LIPID PANEL: CPT

## 2025-04-08 ENCOUNTER — OFFICE VISIT (OUTPATIENT)
Dept: FAMILY MEDICINE CLINIC | Facility: CLINIC | Age: 65
End: 2025-04-08
Payer: COMMERCIAL

## 2025-04-08 VITALS
OXYGEN SATURATION: 98 % | SYSTOLIC BLOOD PRESSURE: 120 MMHG | BODY MASS INDEX: 29.03 KG/M2 | HEIGHT: 73 IN | WEIGHT: 219 LBS | DIASTOLIC BLOOD PRESSURE: 76 MMHG | HEART RATE: 72 BPM

## 2025-04-08 DIAGNOSIS — M51.26 LUMBAR DISC HERNIATION: ICD-10-CM

## 2025-04-08 DIAGNOSIS — G43.909 MIGRAINE WITHOUT STATUS MIGRAINOSUS, NOT INTRACTABLE, UNSPECIFIED MIGRAINE TYPE: ICD-10-CM

## 2025-04-08 DIAGNOSIS — E78.2 MIXED HYPERLIPIDEMIA: Primary | ICD-10-CM

## 2025-04-08 DIAGNOSIS — Z23 ENCOUNTER FOR IMMUNIZATION: ICD-10-CM

## 2025-04-08 DIAGNOSIS — J45.20 MILD INTERMITTENT ASTHMA WITHOUT COMPLICATION: ICD-10-CM

## 2025-04-08 DIAGNOSIS — M17.0 PRIMARY OSTEOARTHRITIS OF BOTH KNEES: ICD-10-CM

## 2025-04-08 DIAGNOSIS — Z12.5 PROSTATE CANCER SCREENING: ICD-10-CM

## 2025-04-08 DIAGNOSIS — Z12.11 COLON CANCER SCREENING: ICD-10-CM

## 2025-04-08 PROCEDURE — 99214 OFFICE O/P EST MOD 30 MIN: CPT | Performed by: FAMILY MEDICINE

## 2025-04-08 PROCEDURE — 90715 TDAP VACCINE 7 YRS/> IM: CPT | Performed by: FAMILY MEDICINE

## 2025-04-08 PROCEDURE — 90471 IMMUNIZATION ADMIN: CPT | Performed by: FAMILY MEDICINE

## 2025-04-08 NOTE — PROGRESS NOTES
Name: Naldo Fox      : 1960      MRN: [id number]  Encounter Provider: Deshawn Varma MD  Encounter Date: 2025   Encounter department: [facility] PRIMARY CARE  :  Assessment & Plan  Mixed hyperlipidemia  Patient's cholesterol is doing well.  No changes.  Continue simvastatin.  Check in 6 months.  Orders:  •  Comprehensive metabolic panel; Future  •  Lipid panel; Future    Mild intermittent asthma without complication  Patient does have albuterol available.  Does not need to use it.  I would recommend that he keep it on hand, but again not needed would be the best outcome for him.       Migraine without status migrainosus, not intractable, unspecified migraine type  Since cervical spine issues, has not had more migraines.       Lumbar disc herniation  Following with pain management.  No specific change.       Primary osteoarthritis of both knees         Prostate cancer screening         Colon cancer screening         Encounter for immunization        1. Mixed hyperlipidemia  Assessment & Plan:  Patient's cholesterol is doing well.  No changes.  Continue simvastatin.  Check in 6 months.  Orders:  •  Comprehensive metabolic panel; Future  •  Lipid panel; Future    Orders:  -     Comprehensive metabolic panel; Future; Expected date: 10/08/2025  -     Lipid panel; Future; Expected date: 10/08/2025  -     Comprehensive metabolic panel  -     Lipid panel  2. Mild intermittent asthma without complication  Assessment & Plan:  Patient does have albuterol available.  Does not need to use it.  I would recommend that he keep it on hand, but again not needed would be the best outcome for him.       3. Migraine without status migrainosus, not intractable, unspecified migraine type  Assessment & Plan:  Since cervical spine issues, has not had more migraines.       4. Lumbar disc herniation  Assessment & Plan:  Following with pain management.  No specific change.       5. Primary osteoarthritis of  "both knees  Comments:  Post knee replacement with orthopedics.  Doing well.  6. Prostate cancer screening  Comments:  Recommend yearly PSA.  Recently 0.63, excellent.  Recheck 1 year.  7. Colon cancer screening  Comments:  Due January 2026 per last 1 that was performed.  8. Encounter for immunization  -     TDAP VACCINE GREATER THAN OR EQUAL TO 8YO IM      Chief Complaint   Patient presents with   • Follow-up     Follow up to chronic conditions and review lab results.               History of Present Illness   Here to follow-up on multiple issues.    Reviewed chart.  Labs done recently.  Reviewed those.  Also reviewed his labs that he had done for work, which included a PSA.          Review of Systems   Constitutional: Negative.    HENT: Negative.     Eyes: Negative.    Respiratory: Negative.     Cardiovascular: Negative.    Gastrointestinal: Negative.    Endocrine: Negative.    Genitourinary: Negative.    Musculoskeletal: Negative.    Skin: Negative.    Allergic/Immunologic: Negative.    Neurological: Negative.    Hematological: Negative.    Psychiatric/Behavioral: Negative.         Objective   /76   Pulse 72   Ht 6' 1\" (1.854 m)   Wt 99.3 kg (219 lb)   SpO2 98%   BMI 28.89 kg/m²      Total cholesterol 164, LDL 93, HDL 48, triglycerides 117.  Sodium 141, potassium 4.3, calcium 9.9.  Blood sugar 90.  Creatinine 0.97, GFR 82.  AST 25, ALT 25.  A1c 5.8 (work related lab work.)  PSA from work labs: 0.63.  (2022: 0.55).      Physical Exam  Vitals and nursing note reviewed.   Constitutional:       Appearance: Normal appearance. He is well-developed.   HENT:      Head: Normocephalic and atraumatic.   Cardiovascular:      Rate and Rhythm: Normal rate and regular rhythm.      Pulses:           Carotid pulses are 2+ on the right side and 2+ on the left side.     Heart sounds: Normal heart sounds. No murmur heard.     No friction rub. No gallop.   Pulmonary:      Effort: Pulmonary effort is normal. No respiratory " distress.      Breath sounds: Normal breath sounds. No wheezing or rales.   Musculoskeletal:      Cervical back: Normal range of motion and neck supple.   Neurological:      Mental Status: He is alert.

## 2025-04-08 NOTE — PATIENT INSTRUCTIONS
1. Mixed hyperlipidemia  Assessment & Plan:  Patient's cholesterol is doing well.  No changes.  Continue simvastatin.  Check in 6 months.       2. Mild intermittent asthma without complication  Assessment & Plan:  Patient does have albuterol available.  Does not need to use it.  I would recommend that he keep it on hand, but again not needed would be the best outcome for him.       3. Migraine without status migrainosus, not intractable, unspecified migraine type  Assessment & Plan:  Since cervical spine issues, has not had more migraines.       4. Lumbar disc herniation  Assessment & Plan:  Following with pain management.  No specific change.       5. Primary osteoarthritis of both knees  Comments:  Post knee replacement with orthopedics.  Doing well.  6. Prostate cancer screening  Comments:  Recommend yearly PSA.  Recently 0.63, excellent.  Recheck 1 year.  7. Colon cancer screening  Comments:  Due January 2026 per last 1 that was performed.      COVID 19 Instructions    Naldo VITOR MarroquinFox was advised to limit contact with others to essential tasks such as getting food, medications, and medical care.    Proper handwashing reviewed, and Hand sanitzer when washing is not available.    If the patient develops symptoms of COVID 19, the patient should call the office as soon as possible.    It is strongly recommended that Flu Vaccinations be obtained.      Virtual Visits:  Lamar: This works on smart phones (any phone with Internet browsing capability).  You should get a text message when the provider is ready to see you.  Click on the link in the text message, and the call should start.  You will need to type in your name, and allow camera and microphone access.  This is HIPPA compliant, and secure.      If you have not already done so, get immunized to COVID 19.      We are committed to getting you vaccinated as soon as possible and will be closely following CDC and [place] [facility] guidelines as they are released and  revised.  Please refer to our COVID-19 vaccine webpage for the most up to date information on the vaccine and our distribution efforts.    This site will also have the most up to date recommendations for COVID booster vaccine.    [email or web address]-vaccine    Call [phone] ([phone]), option 7    You can also visit https://www.vaccines.gov/ to find vaccines in your area.    OUR LOCATION:    [address], [address]  [place], [place], [address] [phone]  Fax: [phone]    Lab services, Rheumatology, and OB/GYN are at this location as well.

## 2025-04-08 NOTE — ASSESSMENT & PLAN NOTE
Patient does have albuterol available.  Does not need to use it.  I would recommend that he keep it on hand, but again not needed would be the best outcome for him.

## 2025-04-08 NOTE — ASSESSMENT & PLAN NOTE
Patient's cholesterol is doing well.  No changes.  Continue simvastatin.  Check in 6 months.  Orders:  •  Comprehensive metabolic panel; Future  •  Lipid panel; Future

## 2025-07-24 ENCOUNTER — NURSE TRIAGE (OUTPATIENT)
Age: 65
End: 2025-07-24

## 2025-07-24 NOTE — TELEPHONE ENCOUNTER
"REASON FOR CONVERSATION: Post-op Problem    SYMPTOMS: ?suture poking through skin by umbilical incision for a few weeks. Denies Sx infection. +TTP. Requesting evaluation.     OTHER HEALTH INFORMATION: s/p Procedures:      REPAIR HERNIA INGUINAL  W/ ROBOTICS WITH MESH (Bilateral: Groin)      REPAIR HERNIA UMBILICAL (Abdomen) with Dr. Medina on 4/18/24    PROTOCOL DISPOSITION: No disposition on file.    CARE ADVICE PROVIDED: appointment scheduled. Call back with any new or worsening sxs.    PRACTICE FOLLOW-UP: n/a    Reason for Disposition   Patient wants to be seen    Answer Assessment - Initial Assessment Questions  1. SYMPTOM: \"What's the main symptom you're concerned about?\" (e.g., drainage, incision opened up, pain, redness)      ?suture poking through skin  2. ONSET: \"When did sxs  start?\"      Few weeks ago  3. SURGERY: \"What surgery did you have?\"      Procedures:      REPAIR HERNIA INGUINAL  W/ ROBOTICS WITH MESH (Bilateral: Groin)      REPAIR HERNIA UMBILICAL (Abdomen)   4. DATE of SURGERY: \"When was the surgery?\"       4/18/24  5. INCISION SITE: \"Where is the incision located?\"       umbilical  6. REDNESS: \"Is there any redness at the incision site?\" If Yes, ask: \"How wide across is the redness?\" (Inches, centimeters)       no  7. PAIN: \"Is there any pain?\" If Yes, ask: \"How bad is it?\"  (Scale 1-10; or mild, moderate, severe)      +ttp  8. BLEEDING: \"Is there any bleeding?\" If Yes, ask: \"How much?\" and \"Where?\"      no  9. DRAINAGE: \"Is there any drainage from the incision site?\" If Yes, ask: \"What color and how much?\" (e.g., red, cloudy, pus; drops, teaspoon)      no  10. FEVER: \"Do you have a fever?\" If Yes, ask: \"What is your temperature, how was it measured, and when did it start?\"        no  11. OTHER SYMPTOMS: \"Do you have any other symptoms?\" (e.g., dizziness, rash elsewhere on body, shaking chills, weakness)        no    Protocols used: Post-Op Incision Symptoms and Questions-Adult-[place]    "

## 2025-07-28 ENCOUNTER — OFFICE VISIT (OUTPATIENT)
Dept: SURGERY | Facility: CLINIC | Age: 65
End: 2025-07-28
Payer: COMMERCIAL

## 2025-07-28 VITALS — TEMPERATURE: 98.5 F | WEIGHT: 218 LBS | BODY MASS INDEX: 28.89 KG/M2 | HEIGHT: 73 IN

## 2025-07-28 DIAGNOSIS — T81.89XA SUTURE GRANULOMA: Primary | ICD-10-CM

## 2025-07-28 PROCEDURE — 99243 OFF/OP CNSLTJ NEW/EST LOW 30: CPT

## 2025-08-04 ENCOUNTER — OFFICE VISIT (OUTPATIENT)
Dept: PAIN MEDICINE | Facility: CLINIC | Age: 65
End: 2025-08-04
Payer: COMMERCIAL

## 2025-08-04 VITALS
HEART RATE: 71 BPM | HEIGHT: 73 IN | TEMPERATURE: 98.4 F | BODY MASS INDEX: 28.63 KG/M2 | OXYGEN SATURATION: 96 % | WEIGHT: 216 LBS

## 2025-08-04 DIAGNOSIS — G89.4 CHRONIC PAIN SYNDROME: ICD-10-CM

## 2025-08-04 DIAGNOSIS — M51.26 LUMBAR DISC HERNIATION: ICD-10-CM

## 2025-08-04 DIAGNOSIS — M54.16 LUMBAR RADICULOPATHY: Primary | ICD-10-CM

## 2025-08-04 DIAGNOSIS — M48.061 LUMBAR FORAMINAL STENOSIS: ICD-10-CM

## 2025-08-04 PROCEDURE — 99214 OFFICE O/P EST MOD 30 MIN: CPT | Performed by: NURSE PRACTITIONER

## 2025-08-04 RX ORDER — GABAPENTIN 300 MG/1
CAPSULE ORAL
Qty: 270 CAPSULE | Refills: 1 | Status: SHIPPED | OUTPATIENT
Start: 2025-08-04

## 2025-08-06 DIAGNOSIS — J30.9 ALLERGIC RHINITIS, UNSPECIFIED SEASONALITY, UNSPECIFIED TRIGGER: ICD-10-CM

## 2025-08-07 RX ORDER — NICOTINE 14MG/24HR
1 PATCH, TRANSDERMAL 24 HOURS TRANSDERMAL DAILY
Qty: 30 TABLET | Refills: 5 | Status: SHIPPED | OUTPATIENT
Start: 2025-08-07

## (undated) DEVICE — DRAPE LAPAROTOMY W/POUCHES

## (undated) DEVICE — MONOPOLAR CURVED SCISSORS: Brand: ENDOWRIST

## (undated) DEVICE — SUT VICRYL 2-0 SH 27 IN UNDYED J417H

## (undated) DEVICE — TIBURON LAPAROSCOPIC ABDOMINAL DRAPE: Brand: CONVERTORS

## (undated) DEVICE — PMI DISPOSABLE PUNCTURE CLOSURE DEVICE / SUTURE GRASPER: Brand: PMI

## (undated) DEVICE — GLOVE SRG BIOGEL 6.5

## (undated) DEVICE — ARM DRAPE

## (undated) DEVICE — GLOVE INDICATOR PI UNDERGLOVE SZ 6.5 BLUE

## (undated) DEVICE — MEGA SUTURECUT ND: Brand: ENDOWRIST

## (undated) DEVICE — ASTOUND STANDARD SURGICAL GOWN, XL: Brand: CONVERTORS

## (undated) DEVICE — [HIGH FLOW INSUFFLATOR,  DO NOT USE IF PACKAGE IS DAMAGED,  KEEP DRY,  KEEP AWAY FROM SUNLIGHT,  PROTECT FROM HEAT AND RADIOACTIVE SOURCES.]: Brand: PNEUMOSURE

## (undated) DEVICE — SUT VICRYL 3-0 SH 27 IN J416H

## (undated) DEVICE — PROGRASP FORCEPS: Brand: ENDOWRIST

## (undated) DEVICE — DRAPE SHEET X-LG

## (undated) DEVICE — GLOVE INDICATOR PI UNDERGLOVE SZ 8 BLUE

## (undated) DEVICE — ALLENTOWN LAP CHOLE APP PACK: Brand: CARDINAL HEALTH

## (undated) DEVICE — SUT PROLENE 2-0 MO-6 30 IN 8417H

## (undated) DEVICE — SCD SEQUENTIAL COMPRESSION COMFORT SLEEVE MEDIUM KNEE LENGTH: Brand: KENDALL SCD

## (undated) DEVICE — ADHESIVE SKIN HIGH VISCOSITY EXOFIN 1ML

## (undated) DEVICE — INTENDED FOR TISSUE SEPARATION, AND OTHER PROCEDURES THAT REQUIRE A SHARP SURGICAL BLADE TO PUNCTURE OR CUT.: Brand: BARD-PARKER SAFETY BLADES SIZE 11, STERILE

## (undated) DEVICE — CHLORAPREP HI-LITE 26ML ORANGE

## (undated) DEVICE — SUT MONOCRYL 4-0 PS-2 27 IN Y426H

## (undated) DEVICE — GLOVE SRG BIOGEL ECLIPSE 7.5

## (undated) DEVICE — ELECTRO LUBE IS A SINGLE PATIENT USE DEVICE THAT IS INTENDED TO BE USED ON ELECTROSURGICAL ELECTRODES TO REDUCE STICKING.: Brand: KEY SURGICAL ELECTRO LUBE

## (undated) DEVICE — PENCIL ELECTROSURG E-Z CLEAN -0035H

## (undated) DEVICE — SUT VLOC 90 3-0 V-20 9IN VLOCM0644

## (undated) DEVICE — NEEDLE HYPO 22G X 1-1/2 IN

## (undated) DEVICE — CANNULA SEAL

## (undated) DEVICE — DRAPE EQUIPMENT RF WAND

## (undated) DEVICE — SUT VICRYL 0 UR-6 27 IN J603H

## (undated) DEVICE — MAYO STAND COVER: Brand: CONVERTORS

## (undated) DEVICE — TIP COVER ACCESSORY

## (undated) DEVICE — COLUMN DRAPE

## (undated) DEVICE — TRAY FOLEY 16FR URIMETER SURESTEP

## (undated) DEVICE — BLADELESS OBTURATOR: Brand: WECK VISTA

## (undated) DEVICE — DISPOSABLE OR TOWEL: Brand: CARDINAL HEALTH